# Patient Record
Sex: FEMALE | Race: WHITE | NOT HISPANIC OR LATINO | Employment: OTHER | URBAN - NONMETROPOLITAN AREA
[De-identification: names, ages, dates, MRNs, and addresses within clinical notes are randomized per-mention and may not be internally consistent; named-entity substitution may affect disease eponyms.]

---

## 2023-02-28 PROBLEM — M25.561 RIGHT KNEE PAIN: Status: ACTIVE | Noted: 2023-02-28

## 2023-02-28 PROBLEM — J32.4 CHRONIC PANSINUSITIS: Status: ACTIVE | Noted: 2023-02-28

## 2023-02-28 PROBLEM — R00.2 HEART PALPITATIONS: Status: ACTIVE | Noted: 2023-02-28

## 2023-02-28 PROBLEM — I25.10 CAD (CORONARY ATHEROSCLEROTIC DISEASE): Status: ACTIVE | Noted: 2023-02-28

## 2023-02-28 PROBLEM — L72.3 SEBACEOUS CYST: Status: ACTIVE | Noted: 2023-02-28

## 2023-02-28 PROBLEM — M17.9 OSTEOARTHRITIS OF KNEE: Status: ACTIVE | Noted: 2023-02-28

## 2023-02-28 PROBLEM — K21.9 GERD (GASTROESOPHAGEAL REFLUX DISEASE): Status: ACTIVE | Noted: 2023-02-28

## 2023-02-28 PROBLEM — I49.1 ATRIAL PREMATURE COMPLEX: Status: ACTIVE | Noted: 2023-02-28

## 2023-02-28 PROBLEM — I49.3 PREMATURE VENTRICULAR CONTRACTIONS: Status: ACTIVE | Noted: 2023-02-28

## 2023-02-28 PROBLEM — M79.7 FIBROMYALGIA: Status: ACTIVE | Noted: 2023-02-28

## 2023-02-28 PROBLEM — M79.10 MYALGIA: Status: ACTIVE | Noted: 2023-02-28

## 2023-02-28 PROBLEM — S22.000A CLOSED COMPRESSION FRACTURE OF THORACIC VERTEBRA (MULTI): Status: ACTIVE | Noted: 2023-02-28

## 2023-02-28 PROBLEM — F41.1 GENERALIZED ANXIETY DISORDER: Status: ACTIVE | Noted: 2023-02-28

## 2023-02-28 PROBLEM — R05.3 CHRONIC COUGH: Status: ACTIVE | Noted: 2023-02-28

## 2023-02-28 PROBLEM — R19.7 DIARRHEA: Status: ACTIVE | Noted: 2023-02-28

## 2023-02-28 PROBLEM — E78.5 HYPERLIPIDEMIA: Status: ACTIVE | Noted: 2023-02-28

## 2023-02-28 PROBLEM — G43.009 COMMON MIGRAINE WITHOUT AURA: Status: ACTIVE | Noted: 2023-02-28

## 2023-02-28 PROBLEM — M54.50 LUMBAGO: Status: ACTIVE | Noted: 2023-02-28

## 2023-02-28 PROBLEM — H61.23 BILATERAL IMPACTED CERUMEN: Status: ACTIVE | Noted: 2023-02-28

## 2023-02-28 PROBLEM — J30.9 ALLERGIC RHINITIS: Status: ACTIVE | Noted: 2023-02-28

## 2023-02-28 PROBLEM — J31.0 CHRONIC RHINITIS: Status: ACTIVE | Noted: 2023-02-28

## 2023-02-28 PROBLEM — M81.0 OSTEOPOROSIS: Status: ACTIVE | Noted: 2023-02-28

## 2023-02-28 PROBLEM — I73.9 PERIPHERAL VASCULAR DISEASE (CMS-HCC): Status: ACTIVE | Noted: 2023-02-28

## 2023-02-28 PROBLEM — R21 RASH: Status: ACTIVE | Noted: 2023-02-28

## 2023-02-28 PROBLEM — M54.6 THORACIC SPINE PAIN: Status: ACTIVE | Noted: 2023-02-28

## 2023-02-28 PROBLEM — F33.1 MDD (MAJOR DEPRESSIVE DISORDER), RECURRENT EPISODE, MODERATE (MULTI): Status: ACTIVE | Noted: 2023-02-28

## 2023-02-28 PROBLEM — J30.0 NONALLERGIC VASOMOTOR RHINITIS: Status: ACTIVE | Noted: 2023-02-28

## 2023-02-28 PROBLEM — M54.2 CERVICALGIA: Status: ACTIVE | Noted: 2023-02-28

## 2023-02-28 PROBLEM — M19.90 OSTEOARTHRITIS: Status: ACTIVE | Noted: 2023-02-28

## 2023-02-28 PROBLEM — N39.41 URGE INCONTINENCE OF URINE: Status: ACTIVE | Noted: 2023-02-28

## 2023-02-28 PROBLEM — K58.9 IRRITABLE BOWEL SYNDROME: Status: ACTIVE | Noted: 2023-02-28

## 2023-02-28 PROBLEM — J01.80 OTHER ACUTE SINUSITIS: Status: ACTIVE | Noted: 2023-02-28

## 2023-02-28 PROBLEM — I65.29 CAROTID ARTERY STENOSIS: Status: ACTIVE | Noted: 2023-02-28

## 2023-02-28 PROBLEM — I10 BENIGN ESSENTIAL HYPERTENSION: Status: ACTIVE | Noted: 2023-02-28

## 2023-02-28 PROBLEM — J34.2 DEVIATED SEPTUM: Status: ACTIVE | Noted: 2023-02-28

## 2023-02-28 RX ORDER — ERGOCALCIFEROL 1.25 MG/1
CAPSULE ORAL
COMMUNITY
Start: 2012-12-22 | End: 2023-09-07 | Stop reason: ALTCHOICE

## 2023-02-28 RX ORDER — MULTIVIT-MIN/IRON/FOLIC ACID/K 18-600-40
CAPSULE ORAL
COMMUNITY
End: 2023-04-28 | Stop reason: SDUPTHER

## 2023-02-28 RX ORDER — IPRATROPIUM BROMIDE 21 UG/1
SPRAY, METERED NASAL
COMMUNITY
Start: 2017-03-17 | End: 2023-04-28 | Stop reason: ALTCHOICE

## 2023-02-28 RX ORDER — EZETIMIBE 10 MG/1
1 TABLET ORAL DAILY
COMMUNITY
Start: 2020-10-20 | End: 2023-04-28 | Stop reason: ALTCHOICE

## 2023-02-28 RX ORDER — FLUTICASONE PROPIONATE 50 MCG
2 SPRAY, SUSPENSION (ML) NASAL DAILY
COMMUNITY
Start: 2022-08-16

## 2023-02-28 RX ORDER — LOPERAMIDE HYDROCHLORIDE 2 MG/1
CAPSULE ORAL
COMMUNITY
End: 2023-09-07 | Stop reason: ALTCHOICE

## 2023-02-28 RX ORDER — LORATADINE 10 MG/1
TABLET ORAL
COMMUNITY
Start: 2022-08-16 | End: 2024-01-04

## 2023-02-28 RX ORDER — TRIAMCINOLONE ACETONIDE 1 MG/G
CREAM TOPICAL AS NEEDED
COMMUNITY
Start: 2021-12-14 | End: 2024-03-15

## 2023-02-28 RX ORDER — ACETAMINOPHEN 500 MG
TABLET ORAL
COMMUNITY
End: 2023-04-28 | Stop reason: ALTCHOICE

## 2023-02-28 RX ORDER — METHYLPREDNISOLONE 4 MG/1
TABLET ORAL
COMMUNITY
Start: 2022-09-23 | End: 2023-03-06 | Stop reason: WASHOUT

## 2023-02-28 RX ORDER — OXYMETAZOLINE HCL 0.05 %
SPRAY, NON-AEROSOL (ML) NASAL
COMMUNITY
End: 2023-07-10 | Stop reason: ALTCHOICE

## 2023-02-28 NOTE — ASSESSMENT & PLAN NOTE
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

## 2023-03-06 ENCOUNTER — OFFICE VISIT (OUTPATIENT)
Dept: PRIMARY CARE | Facility: CLINIC | Age: 78
End: 2023-03-06
Payer: MEDICARE

## 2023-03-06 VITALS
DIASTOLIC BLOOD PRESSURE: 90 MMHG | SYSTOLIC BLOOD PRESSURE: 142 MMHG | OXYGEN SATURATION: 99 % | WEIGHT: 102.13 LBS | HEIGHT: 64 IN | BODY MASS INDEX: 17.44 KG/M2 | HEART RATE: 94 BPM

## 2023-03-06 DIAGNOSIS — R63.6 UNDERWEIGHT: ICD-10-CM

## 2023-03-06 DIAGNOSIS — J32.4 CHRONIC PANSINUSITIS: Primary | ICD-10-CM

## 2023-03-06 PROBLEM — M25.561 RIGHT KNEE PAIN: Status: RESOLVED | Noted: 2023-02-28 | Resolved: 2023-03-06

## 2023-03-06 PROBLEM — M54.2 CERVICALGIA: Status: RESOLVED | Noted: 2023-02-28 | Resolved: 2023-03-06

## 2023-03-06 PROBLEM — H61.23 BILATERAL IMPACTED CERUMEN: Status: RESOLVED | Noted: 2023-02-28 | Resolved: 2023-03-06

## 2023-03-06 PROBLEM — J01.80 OTHER ACUTE SINUSITIS: Status: RESOLVED | Noted: 2023-02-28 | Resolved: 2023-03-06

## 2023-03-06 PROBLEM — M19.90 OSTEOARTHRITIS: Status: RESOLVED | Noted: 2023-02-28 | Resolved: 2023-03-06

## 2023-03-06 PROBLEM — R21 RASH: Status: RESOLVED | Noted: 2023-02-28 | Resolved: 2023-03-06

## 2023-03-06 PROCEDURE — 1036F TOBACCO NON-USER: CPT | Performed by: FAMILY MEDICINE

## 2023-03-06 PROCEDURE — 1160F RVW MEDS BY RX/DR IN RCRD: CPT | Performed by: FAMILY MEDICINE

## 2023-03-06 PROCEDURE — 3080F DIAST BP >= 90 MM HG: CPT | Performed by: FAMILY MEDICINE

## 2023-03-06 PROCEDURE — 1159F MED LIST DOCD IN RCRD: CPT | Performed by: FAMILY MEDICINE

## 2023-03-06 PROCEDURE — 3077F SYST BP >= 140 MM HG: CPT | Performed by: FAMILY MEDICINE

## 2023-03-06 PROCEDURE — 99213 OFFICE O/P EST LOW 20 MIN: CPT | Performed by: FAMILY MEDICINE

## 2023-03-06 RX ORDER — AZITHROMYCIN 250 MG/1
TABLET, FILM COATED ORAL
Qty: 6 TABLET | Refills: 1 | Status: SHIPPED | OUTPATIENT
Start: 2023-03-06 | End: 2023-03-11

## 2023-03-06 NOTE — PATIENT INSTRUCTIONS
If you are on birth control, it is important you use a back up form of contraception for the next month to prevent pregnancy as some antibiotics reduce the effectiveness of birth control. This could result in an unplanned pregnancy.  It is important to complete the entire course of antibiotics because not completing the entire course can result possibly not completely clearing the infection being treated. It also can lead to causing increasing resistance to antibiotics by bacteria. Please finish the entire course of antibiotics unless told otherwise by your provider.  Common side effects of antibiotics include yeast infections, diarrhea and nausea. Sometimes over-the-counter probiotics help prevent the diarrhea and yeast infections caused by antibiotics. If you develop persistent or bloody diarrhea after taking an antibiotic, please contact your provider about the possibility of a serious secondary infection of your colon caused by the antibiotic. Sometimes the nausea from antibiotics can be helped by taking the antibiotic with food unless otherwise specified not to by the pharmacist.   If you develop a rash while on the antibiotic, if could be from the antibiotic, from the illness itself or could be from a response by some viral infections to the antibiotic. Please discuss this with your provider before assuming that you are allergic to the medication.

## 2023-03-06 NOTE — PROGRESS NOTES
Pt. Is having sinus congestion and drainage and weight loss and letter for her to have her mail box close to house. [place]ecet

## 2023-03-06 NOTE — PROGRESS NOTES
Subjective   Patient ID: Kaitlin Robles is a 78 y.o. female who presents for Sinusitis.  HPI  Has been sick for 6 weeks  No HA, fever  Some chills  Having ear pain  + pain in teeth  Right eye tearing all the time  + runny/stuffy nose  Some ST  No n/v, diarrhea  Occasional abdominal pain  No CP  Occasional SOB  Some cough- NP  A lot of PND    Can not gain weight- under a lot of stress  Thinks eats a lot but can not gain weight  No diarrhea    Need a letter to get mail box by her house because hard time getting in and out of the car to get mail    Current Outpatient Medications:     acetaminophen (Tylenol) 500 mg tablet, Acetaminophen 500 MG Oral Tablet  Refills: 0     Active, Disp: , Rfl:     ascorbic acid, vitamin C, 500 mg capsule, Vitamin C CAPS, Disp: , Rfl:     azithromycin (Zithromax) 250 mg tablet, Take 2 tablets (500 mg) by mouth once daily for 1 day, THEN 1 tablet (250 mg) once daily for 4 days. Take 2 tabs (500 mg) by mouth today, than 1 daily for 4 days.., Disp: 6 tablet, Rfl: 1    diphenhydramine HCl (BENADRYL ORAL), Benadryl TABS  Refills: 0     Active, Disp: , Rfl:     ergocalciferol (Vitamin D-2) 1.25 MG (08608 UT) capsule, Vitamin D (Ergocalciferol) 1.25 MG (58477 UT) Oral Capsule, Disp: , Rfl:     ezetimibe (Zetia) 10 mg tablet, Take 1 tablet (10 mg) by mouth once daily., Disp: , Rfl:     fluticasone (Flonase) 50 mcg/actuation nasal spray, Administer 2 sprays into each nostril once daily., Disp: , Rfl:     ipratropium (Atrovent) 21 mcg (0.03 %) nasal spray, Ipratropium Bromide 0.03 % Nasal Solution  Quantity: 30  Refills: 0      Start : 17-Mar-2017  Active, Disp: , Rfl:     loperamide (Imodium A-D) 2 mg capsule, Imodium 2 MG CAPS  Refills: 0     Active, Disp: , Rfl:     loratadine (CLARITIN ORAL), Claritin TABS  Refills: 0     Active, Disp: , Rfl:     loratadine (Claritin) 10 mg tablet, TAKE 1 TABLET EVERY MORNING AS NEEDED., Disp: , Rfl:     oxymetazoline 0.05 % nasal spray, [facility] [place] 0.05 %  Nasal Solution  Refills: 0     Active, Disp: , Rfl:     triamcinolone (Kenalog) 0.1 % cream, APPLY SPARINGLY AND MASSAGE IN TWICE DAILY., Disp: , Rfl:    Past Surgical History:   Procedure Laterality Date    BREAST LUMPECTOMY  09/25/2013    Breast Surgery Lumpectomy    COLONOSCOPY  03/26/2015    Complete Colonoscopy    ESOPHAGOGASTRODUODENOSCOPY  03/16/2016    Diagnostic Esophagogastroduodenoscopy    KNEE ARTHROSCOPY W/ DEBRIDEMENT  09/25/2013    Knee Arthroscopy (Therapeutic)    OTHER SURGICAL HISTORY  09/25/2013    Breast Surgery Enlargement Procedure Bilateral    OTHER SURGICAL HISTORY  09/25/2013    Breast Surgery Removal Of Mammary Implant Bilateral    OTHER SURGICAL HISTORY  04/18/2022    Cataract surgery    OTHER SURGICAL HISTORY  04/18/2022    Turbinate reduction    OTHER SURGICAL HISTORY  04/18/2022    [place] tooth extraction    OTHER SURGICAL HISTORY  11/20/2019    Cyst excision    TOTAL ABDOMINAL HYSTERECTOMY  09/25/2013    Total Abdominal Hysterectomy      Past Medical History:   Diagnosis Date    Acute bronchitis due to other specified organisms 01/15/2020    Acute bronchitis due to other specified organisms    Acute maxillary sinusitis, unspecified 10/10/2017    Acute non-recurrent maxillary sinusitis    Bitten or stung by nonvenomous insect and other nonvenomous arthropods, initial encounter 07/12/2016    Bedbug bite, initial encounter    Cervicalgia 02/28/2023    Encounter for screening for cardiovascular disorders 04/11/2016    Encounter for screening for cardiovascular disorders    Encounter for screening for diabetes mellitus 04/11/2016    Encounter for screening for diabetes mellitus    Encounter for screening for lipoid disorders 04/11/2016    Encounter for screening for lipoid disorders    Encounter for screening for osteoporosis 03/30/2021    Osteoporosis screening    Enteroviral vesicular pharyngitis 08/19/2015    Herpangina    Impacted cerumen, left ear 06/19/2018    Impacted cerumen of left  ear    Impacted cerumen, right ear 08/29/2018    Impacted cerumen of right ear    Localized enlarged lymph nodes 10/30/2019    Cervical lymphadenopathy    Localized enlarged lymph nodes 11/30/2016    Lymphadenopathy, inguinal    Localized swelling, mass and lump, head 09/23/2019    Lump of scalp    Osteoarthritis 02/28/2023    Otalgia, right ear 01/11/2016    Right ear pain    Other acute sinusitis 01/15/2020    Other acute sinusitis, recurrence not specified    Other fecal abnormalities 11/15/2018    Positive FIT (fecal immunochemical test)    Other forms of stomatitis 01/25/2017    Oral ulcer    Other malaise 11/20/2017    Malaise and fatigue    Other specified disorders of eustachian tube, bilateral 02/23/2018    Dysfunction of both eustachian tubes    Other specified disorders of eustachian tube, right ear 01/11/2016    Dysfunction of right eustachian tube    Personal history of diseases of the skin and subcutaneous tissue 01/06/2017    History of impetigo    Personal history of other diseases of the circulatory system     History of hypertension    Personal history of other diseases of the digestive system 11/18/2015    History of acute gastritis    Personal history of other diseases of the respiratory system 08/25/2014    Personal history of acute sinusitis    Personal history of other diseases of the respiratory system 02/23/2018    History of acute bronchitis    Personal history of other diseases of the respiratory system 10/31/2016    History of acute sinusitis    Personal history of other diseases of the respiratory system 12/16/2013    History of acute bronchitis    Personal history of other infectious and parasitic diseases 01/25/2017    History of candidiasis of mouth    Personal history of other specified conditions 06/19/2018    History of dizziness    Personal history of other specified conditions 12/28/2015    History of fatigue    Personal history of other specified conditions 11/12/2021    History  "of weight loss    Personal history of other specified conditions 01/02/2019    History of urinary frequency    Personal history of other specified conditions 10/12/2022    History of abnormal weight loss    Personal history of other specified conditions     History of painful urination    Personal history of other specified conditions 03/26/2015    History of shortness of breath    Radiculopathy, cervical region 10/10/2017    Cervical radiculopathy    Segmental and somatic dysfunction of cervical region 05/03/2017    Cervical somatic dysfunction    Spondylosis without myelopathy or radiculopathy, cervical region 09/19/2017    Cervical spondylosis    Sprain of ligaments of lumbar spine, initial encounter 02/23/2018    Lumbar sprain, initial encounter    Unspecified hearing loss, right ear 08/29/2018    Hearing loss, right    Urinary tract infection, site not specified 09/13/2021    Acute lower UTI     Social History     Tobacco Use    Smoking status: Never    Smokeless tobacco: Never      Family History   Problem Relation Name Age of Onset    Allergies Mother      Heart failure Mother      Mental illness Mother        Review of Systems    Objective   /90 Comment: 132 90  Pulse 94   Ht 1.626 m (5' 4\")   Wt 46.3 kg (102 lb 2 oz)   SpO2 99%   BMI 17.53 kg/m²    Physical Exam  Constitutional:       Appearance: Normal appearance.   HENT:      Head:      Comments: TTP over B/L Maxillary sinuses     Right Ear: Tympanic membrane normal.      Left Ear: Tympanic membrane normal.      Nose: Congestion present.      Mouth/Throat:      Mouth: Mucous membranes are moist.      Pharynx: Oropharynx is clear.   Cardiovascular:      Rate and Rhythm: Normal rate and regular rhythm.   Pulmonary:      Effort: Pulmonary effort is normal.      Breath sounds: Normal breath sounds.   Musculoskeletal:      Cervical back: Normal range of motion and neck supple.   Lymphadenopathy:      Cervical: Cervical adenopathy present. "   Neurological:      Mental Status: She is alert.   Psychiatric:         Mood and Affect: Mood is anxious.         Assessment/Plan   Problem List Items Addressed This Visit          Endocrine/Metabolic    Underweight     Suspect related to anxiety- refused meds for this            Infectious/Inflammatory    Chronic pansinusitis - Primary    Relevant Medications    azithromycin (Zithromax) 250 mg tablet       Patient understands and agrees with treatment plan    Tenzin Waldrop, DO

## 2023-03-08 NOTE — ASSESSMENT & PLAN NOTE
>>ASSESSMENT AND PLAN FOR UNDERWEIGHT WRITTEN ON 3/7/2023  9:27 PM BY HALEY DAVILA, DO    Suspect related to anxiety- refused meds for this

## 2023-04-10 ENCOUNTER — HOSPITAL ENCOUNTER (OUTPATIENT)
Dept: DATA CONVERSION | Facility: HOSPITAL | Age: 78
End: 2023-04-10
Attending: ORTHOPAEDIC SURGERY

## 2023-04-10 DIAGNOSIS — M17.11 UNILATERAL PRIMARY OSTEOARTHRITIS, RIGHT KNEE: ICD-10-CM

## 2023-04-28 ENCOUNTER — OFFICE VISIT (OUTPATIENT)
Dept: PRIMARY CARE | Facility: CLINIC | Age: 78
End: 2023-04-28
Payer: MEDICARE

## 2023-04-28 VITALS
DIASTOLIC BLOOD PRESSURE: 66 MMHG | HEIGHT: 64 IN | SYSTOLIC BLOOD PRESSURE: 102 MMHG | HEART RATE: 110 BPM | BODY MASS INDEX: 17.93 KG/M2 | OXYGEN SATURATION: 98 % | WEIGHT: 105 LBS

## 2023-04-28 DIAGNOSIS — R30.0 DYSURIA: ICD-10-CM

## 2023-04-28 DIAGNOSIS — I73.9 PERIPHERAL VASCULAR DISEASE (CMS-HCC): ICD-10-CM

## 2023-04-28 DIAGNOSIS — F33.1 MDD (MAJOR DEPRESSIVE DISORDER), RECURRENT EPISODE, MODERATE (MULTI): ICD-10-CM

## 2023-04-28 DIAGNOSIS — H66.001 NON-RECURRENT ACUTE SUPPURATIVE OTITIS MEDIA OF RIGHT EAR WITHOUT SPONTANEOUS RUPTURE OF TYMPANIC MEMBRANE: Primary | ICD-10-CM

## 2023-04-28 DIAGNOSIS — E46 PROTEIN-CALORIE MALNUTRITION, UNSPECIFIED SEVERITY (MULTI): ICD-10-CM

## 2023-04-28 DIAGNOSIS — S22.000D CLOSED COMPRESSION FRACTURE OF THORACIC VERTEBRA WITH ROUTINE HEALING, SUBSEQUENT ENCOUNTER: ICD-10-CM

## 2023-04-28 LAB
POC APPEARANCE, URINE: CLEAR
POC BILIRUBIN, URINE: NEGATIVE
POC BLOOD, URINE: NEGATIVE
POC COLOR, URINE: YELLOW
POC GLUCOSE, URINE: NEGATIVE MG/DL
POC KETONES, URINE: NEGATIVE MG/DL
POC LEUKOCYTES, URINE: ABNORMAL
POC NITRITE,URINE: NEGATIVE
POC PH, URINE: 5 PH
POC PROTEIN, URINE: NEGATIVE MG/DL
POC SPECIFIC GRAVITY, URINE: 1.02
POC UROBILINOGEN, URINE: 0.2 EU/DL

## 2023-04-28 PROCEDURE — 1036F TOBACCO NON-USER: CPT | Performed by: FAMILY MEDICINE

## 2023-04-28 PROCEDURE — 1160F RVW MEDS BY RX/DR IN RCRD: CPT | Performed by: FAMILY MEDICINE

## 2023-04-28 PROCEDURE — 99214 OFFICE O/P EST MOD 30 MIN: CPT | Performed by: FAMILY MEDICINE

## 2023-04-28 PROCEDURE — 1159F MED LIST DOCD IN RCRD: CPT | Performed by: FAMILY MEDICINE

## 2023-04-28 PROCEDURE — 3078F DIAST BP <80 MM HG: CPT | Performed by: FAMILY MEDICINE

## 2023-04-28 PROCEDURE — 81003 URINALYSIS AUTO W/O SCOPE: CPT | Performed by: FAMILY MEDICINE

## 2023-04-28 PROCEDURE — 3074F SYST BP LT 130 MM HG: CPT | Performed by: FAMILY MEDICINE

## 2023-04-28 RX ORDER — DOXYCYCLINE 100 MG/1
100 CAPSULE ORAL 2 TIMES DAILY
Qty: 20 CAPSULE | Refills: 0 | Status: SHIPPED | OUTPATIENT
Start: 2023-04-28 | End: 2023-05-08

## 2023-04-28 RX ORDER — CHLORHEXIDINE GLUCONATE ORAL RINSE 1.2 MG/ML
SOLUTION DENTAL
COMMUNITY
Start: 2023-03-23 | End: 2023-07-10 | Stop reason: ALTCHOICE

## 2023-04-28 RX ORDER — ASCORBIC ACID 500 MG
500 TABLET ORAL DAILY
COMMUNITY
Start: 2005-09-19 | End: 2023-09-07 | Stop reason: ALTCHOICE

## 2023-04-28 ASSESSMENT — ENCOUNTER SYMPTOMS
OCCASIONAL FEELINGS OF UNSTEADINESS: 1
DEPRESSION: 0
LOSS OF SENSATION IN FEET: 0

## 2023-04-28 NOTE — PROGRESS NOTES
Subjective   Patient ID: Kaitlin Robles is a 78 y.o. female who presents for UTI (Right ear bothering her ).  HPI  Having dysuria for 2-3 weeks  + urinary frequency, urgency  No hesitancy  Some urge incontinence  No hematuria, abdominal pain, n/v  No new back pain- saw Dr. Bliss for it    Started with vertigo 1 week ago  Right ear hurts  Gets lightheaded and dizzy when moves head  Some buzzing sound in the right ear  No discharge from ear  Usual runny/stuffy nose  Hearing is fair in the right ear    Mood is okay  No SI/HI  Does not want meds    Will be having knee replacement soon    Trying to eat more      Current Outpatient Medications:     ascorbic acid (Vitamin C) 500 mg tablet, Take 1 tablet (500 mg) by mouth once daily., Disp: , Rfl:     chlorhexidine (Peridex) 0.12 % solution, PLEASE SEE ATTACHED FOR DETAILED DIRECTIONS, Disp: , Rfl:     ascorbic acid, vitamin C, 500 mg capsule, Vitamin C CAPS, Disp: , Rfl:     diphenhydramine HCl (BENADRYL ORAL), Benadryl TABS  Refills: 0     Active, Disp: , Rfl:     doxycycline (Vibramycin) 100 mg capsule, Take 1 capsule (100 mg) by mouth 2 times a day for 10 days., Disp: 20 capsule, Rfl: 0    ergocalciferol (Vitamin D-2) 1.25 MG (19411 UT) capsule, Vitamin D (Ergocalciferol) 1.25 MG (60730 UT) Oral Capsule, Disp: , Rfl:     fluticasone (Flonase) 50 mcg/actuation nasal spray, Administer 2 sprays into each nostril once daily., Disp: , Rfl:     loperamide (Imodium A-D) 2 mg capsule, Imodium 2 MG CAPS  Refills: 0     Active, Disp: , Rfl:     loratadine (Claritin) 10 mg tablet, TAKE 1 TABLET EVERY MORNING AS NEEDED., Disp: , Rfl:     oxymetazoline 0.05 % nasal spray, [facility] [place] 0.05 % Nasal Solution  Refills: 0     Active, Disp: , Rfl:     triamcinolone (Kenalog) 0.1 % cream, APPLY SPARINGLY AND MASSAGE IN TWICE DAILY., Disp: , Rfl:    Past Surgical History:   Procedure Laterality Date    BREAST LUMPECTOMY  09/25/2013    Breast Surgery Lumpectomy    COLONOSCOPY   03/26/2015    Complete Colonoscopy    ESOPHAGOGASTRODUODENOSCOPY  03/16/2016    Diagnostic Esophagogastroduodenoscopy    KNEE ARTHROSCOPY W/ DEBRIDEMENT  09/25/2013    Knee Arthroscopy (Therapeutic)    OTHER SURGICAL HISTORY  09/25/2013    Breast Surgery Enlargement Procedure Bilateral    OTHER SURGICAL HISTORY  09/25/2013    Breast Surgery Removal Of Mammary Implant Bilateral    OTHER SURGICAL HISTORY  04/18/2022    Cataract surgery    OTHER SURGICAL HISTORY  04/18/2022    Turbinate reduction    OTHER SURGICAL HISTORY  04/18/2022    [place] tooth extraction    OTHER SURGICAL HISTORY  11/20/2019    Cyst excision    TOTAL ABDOMINAL HYSTERECTOMY  09/25/2013    Total Abdominal Hysterectomy      Past Medical History:   Diagnosis Date    Acute bronchitis due to other specified organisms 01/15/2020    Acute bronchitis due to other specified organisms    Acute maxillary sinusitis, unspecified 10/10/2017    Acute non-recurrent maxillary sinusitis    Bitten or stung by nonvenomous insect and other nonvenomous arthropods, initial encounter 07/12/2016    Bedbug bite, initial encounter    Cervicalgia 02/28/2023    Encounter for screening for cardiovascular disorders 04/11/2016    Encounter for screening for cardiovascular disorders    Encounter for screening for diabetes mellitus 04/11/2016    Encounter for screening for diabetes mellitus    Encounter for screening for lipoid disorders 04/11/2016    Encounter for screening for lipoid disorders    Encounter for screening for osteoporosis 03/30/2021    Osteoporosis screening    Enteroviral vesicular pharyngitis 08/19/2015    Herpangina    Impacted cerumen, left ear 06/19/2018    Impacted cerumen of left ear    Impacted cerumen, right ear 08/29/2018    Impacted cerumen of right ear    Localized enlarged lymph nodes 10/30/2019    Cervical lymphadenopathy    Localized enlarged lymph nodes 11/30/2016    Lymphadenopathy, inguinal    Localized swelling, mass and lump, head 09/23/2019     Lump of scalp    Osteoarthritis 02/28/2023    Otalgia, right ear 01/11/2016    Right ear pain    Other acute sinusitis 01/15/2020    Other acute sinusitis, recurrence not specified    Other fecal abnormalities 11/15/2018    Positive FIT (fecal immunochemical test)    Other forms of stomatitis 01/25/2017    Oral ulcer    Other malaise 11/20/2017    Malaise and fatigue    Other specified disorders of eustachian tube, bilateral 02/23/2018    Dysfunction of both eustachian tubes    Other specified disorders of eustachian tube, right ear 01/11/2016    Dysfunction of right eustachian tube    Personal history of diseases of the skin and subcutaneous tissue 01/06/2017    History of impetigo    Personal history of other diseases of the circulatory system     History of hypertension    Personal history of other diseases of the digestive system 11/18/2015    History of acute gastritis    Personal history of other diseases of the respiratory system 08/25/2014    Personal history of acute sinusitis    Personal history of other diseases of the respiratory system 02/23/2018    History of acute bronchitis    Personal history of other diseases of the respiratory system 10/31/2016    History of acute sinusitis    Personal history of other diseases of the respiratory system 12/16/2013    History of acute bronchitis    Personal history of other infectious and parasitic diseases 01/25/2017    History of candidiasis of mouth    Personal history of other specified conditions 06/19/2018    History of dizziness    Personal history of other specified conditions 12/28/2015    History of fatigue    Personal history of other specified conditions 11/12/2021    History of weight loss    Personal history of other specified conditions 01/02/2019    History of urinary frequency    Personal history of other specified conditions 10/12/2022    History of abnormal weight loss    Personal history of other specified conditions     History of painful  "urination    Personal history of other specified conditions 03/26/2015    History of shortness of breath    Radiculopathy, cervical region 10/10/2017    Cervical radiculopathy    Segmental and somatic dysfunction of cervical region 05/03/2017    Cervical somatic dysfunction    Spondylosis without myelopathy or radiculopathy, cervical region 09/19/2017    Cervical spondylosis    Sprain of ligaments of lumbar spine, initial encounter 02/23/2018    Lumbar sprain, initial encounter    Unspecified hearing loss, right ear 08/29/2018    Hearing loss, right    Urinary tract infection, site not specified 09/13/2021    Acute lower UTI     Social History     Tobacco Use    Smoking status: Never    Smokeless tobacco: Never   Substance Use Topics    Alcohol use: Never    Drug use: Never      Family History   Problem Relation Name Age of Onset    Allergies Mother      Heart failure Mother      Mental illness Mother        Review of Systems    Objective   /66   Pulse 110   Ht 1.626 m (5' 4\")   Wt 47.6 kg (105 lb)   SpO2 98%   BMI 18.02 kg/m²    Physical Exam  Vitals and nursing note reviewed.   Constitutional:       Appearance: Normal appearance.   HENT:      Head: Normocephalic and atraumatic.      Right Ear: Decreased hearing noted. A middle ear effusion is present. Tympanic membrane is erythematous and bulging.      Left Ear: Tympanic membrane, ear canal and external ear normal. Decreased hearing noted.      Ears:      Comments: Purulent effusion     Nose: Congestion present.      Right Turbinates: Pale.      Left Turbinates: Pale.      Mouth/Throat:      Mouth: Mucous membranes are moist.      Pharynx: Oropharynx is clear.   Eyes:      Extraocular Movements: Extraocular movements intact.      Conjunctiva/sclera: Conjunctivae normal.      Pupils: Pupils are equal, round, and reactive to light.   Cardiovascular:      Rate and Rhythm: Normal rate and regular rhythm.      Pulses: Normal pulses.      Heart sounds: Normal " heart sounds.   Pulmonary:      Effort: Pulmonary effort is normal.      Breath sounds: Normal breath sounds.   Abdominal:      General: Abdomen is flat. Bowel sounds are normal.      Palpations: Abdomen is soft.      Tenderness: There is no abdominal tenderness. There is no right CVA tenderness or left CVA tenderness.   Musculoskeletal:      Cervical back: Normal range of motion and neck supple.   Lymphadenopathy:      Cervical: No cervical adenopathy.   Skin:     Capillary Refill: Capillary refill takes less than 2 seconds.   Neurological:      General: No focal deficit present.      Mental Status: She is alert and oriented to person, place, and time.   Psychiatric:         Mood and Affect: Mood normal.         Behavior: Behavior normal.         Assessment/Plan   Problem List Items Addressed This Visit       MDD (major depressive disorder), recurrent episode, moderate (CMS/HCC)    Peripheral vascular disease (CMS/HCC)    Closed compression fracture of thoracic vertebra (CMS/HCC)    Protein-calorie malnutrition, unspecified severity (CMS/HCC)     Other Visit Diagnoses       Non-recurrent acute suppurative otitis media of right ear without spontaneous rupture of tympanic membrane    -  Primary    Relevant Medications    doxycycline (Vibramycin) 100 mg capsule    Dysuria        Relevant Medications    doxycycline (Vibramycin) 100 mg capsule    Other Relevant Orders    POCT UA Automated manually resulted (Completed)        OM- doxycycline. Heat    Dysuria- fluids, regular urination    Malnutrition- improving- increase food intake    MDD- refuses meds    PVD- low fat diet, refuses statin    Compression fracture- F/U with ortho      Patient understands and agrees with treatment plan    Tenzin Waldrop DO   Patient was identified as a fall risk. Risk prevention instructions provided.

## 2023-04-28 NOTE — PATIENT INSTRUCTIONS

## 2023-05-22 ENCOUNTER — HOSPITAL ENCOUNTER (OUTPATIENT)
Dept: DATA CONVERSION | Facility: HOSPITAL | Age: 78
End: 2023-05-23
Attending: ORTHOPAEDIC SURGERY | Admitting: ORTHOPAEDIC SURGERY
Payer: MEDICARE

## 2023-05-22 DIAGNOSIS — I25.10 ATHEROSCLEROTIC HEART DISEASE OF NATIVE CORONARY ARTERY WITHOUT ANGINA PECTORIS: ICD-10-CM

## 2023-05-22 DIAGNOSIS — M17.11 UNILATERAL PRIMARY OSTEOARTHRITIS, RIGHT KNEE: ICD-10-CM

## 2023-05-22 DIAGNOSIS — E78.5 HYPERLIPIDEMIA, UNSPECIFIED: ICD-10-CM

## 2023-05-22 DIAGNOSIS — I73.9 PERIPHERAL VASCULAR DISEASE, UNSPECIFIED (CMS-HCC): ICD-10-CM

## 2023-05-22 DIAGNOSIS — Z88.2 ALLERGY STATUS TO SULFONAMIDES: ICD-10-CM

## 2023-05-22 DIAGNOSIS — Z88.1 ALLERGY STATUS TO OTHER ANTIBIOTIC AGENTS: ICD-10-CM

## 2023-05-22 DIAGNOSIS — I10 ESSENTIAL (PRIMARY) HYPERTENSION: ICD-10-CM

## 2023-05-22 DIAGNOSIS — Z88.5 ALLERGY STATUS TO NARCOTIC AGENT: ICD-10-CM

## 2023-05-22 DIAGNOSIS — K58.9 IRRITABLE BOWEL SYNDROME WITHOUT DIARRHEA: ICD-10-CM

## 2023-05-22 DIAGNOSIS — K21.9 GASTRO-ESOPHAGEAL REFLUX DISEASE WITHOUT ESOPHAGITIS: ICD-10-CM

## 2023-05-22 DIAGNOSIS — Z88.0 ALLERGY STATUS TO PENICILLIN: ICD-10-CM

## 2023-05-23 LAB
ANION GAP IN SER/PLAS: 12 MMOL/L (ref 10–20)
CALCIUM (MG/DL) IN SER/PLAS: 9.2 MG/DL (ref 8.6–10.3)
CARBON DIOXIDE, TOTAL (MMOL/L) IN SER/PLAS: 26 MMOL/L (ref 21–32)
CHLORIDE (MMOL/L) IN SER/PLAS: 101 MMOL/L (ref 98–107)
CREATININE (MG/DL) IN SER/PLAS: 0.78 MG/DL (ref 0.5–1.05)
ERYTHROCYTE DISTRIBUTION WIDTH (RATIO) BY AUTOMATED COUNT: 12.9 % (ref 11.5–14.5)
ERYTHROCYTE MEAN CORPUSCULAR HEMOGLOBIN CONCENTRATION (G/DL) BY AUTOMATED: 32.2 G/DL (ref 32–36)
ERYTHROCYTE MEAN CORPUSCULAR VOLUME (FL) BY AUTOMATED COUNT: 95 FL (ref 80–100)
ERYTHROCYTES (10*6/UL) IN BLOOD BY AUTOMATED COUNT: 3.77 X10E12/L (ref 4–5.2)
GFR FEMALE: 79 ML/MIN/1.73M2
GLUCOSE (MG/DL) IN SER/PLAS: 114 MG/DL (ref 74–99)
HEMATOCRIT (%) IN BLOOD BY AUTOMATED COUNT: 36 % (ref 36–46)
HEMOGLOBIN (G/DL) IN BLOOD: 11.6 G/DL (ref 12–16)
LEUKOCYTES (10*3/UL) IN BLOOD BY AUTOMATED COUNT: 12.5 X10E9/L (ref 4.4–11.3)
PLATELETS (10*3/UL) IN BLOOD AUTOMATED COUNT: 231 X10E9/L (ref 150–450)
POTASSIUM (MMOL/L) IN SER/PLAS: 4.2 MMOL/L (ref 3.5–5.3)
SODIUM (MMOL/L) IN SER/PLAS: 135 MMOL/L (ref 136–145)
UREA NITROGEN (MG/DL) IN SER/PLAS: 12 MG/DL (ref 6–23)

## 2023-05-24 ENCOUNTER — NURSING HOME VISIT (OUTPATIENT)
Dept: POST ACUTE CARE | Facility: EXTERNAL LOCATION | Age: 78
End: 2023-05-24
Payer: MEDICARE

## 2023-05-24 DIAGNOSIS — K59.00 CONSTIPATION, UNSPECIFIED CONSTIPATION TYPE: ICD-10-CM

## 2023-05-24 DIAGNOSIS — Z96.651 S/P TOTAL KNEE ARTHROPLASTY, RIGHT: ICD-10-CM

## 2023-05-24 DIAGNOSIS — I10 BENIGN ESSENTIAL HYPERTENSION: ICD-10-CM

## 2023-05-24 DIAGNOSIS — L30.9 DERMATITIS: ICD-10-CM

## 2023-05-24 DIAGNOSIS — I25.10 ATHEROSCLEROSIS OF CORONARY ARTERY, UNSPECIFIED VESSEL OR LESION TYPE, UNSPECIFIED WHETHER ANGINA PRESENT, UNSPECIFIED WHETHER NATIVE OR TRANSPLANTED HEART: ICD-10-CM

## 2023-05-24 DIAGNOSIS — J31.0 CHRONIC RHINITIS: ICD-10-CM

## 2023-05-24 DIAGNOSIS — M16.11 PRIMARY OSTEOARTHRITIS OF RIGHT HIP: Primary | ICD-10-CM

## 2023-05-24 LAB
ALANINE AMINOTRANSFERASE (SGPT) (U/L) IN SER/PLAS: 23 U/L (ref 7–45)
ALBUMIN (G/DL) IN SER/PLAS: 3.7 G/DL (ref 3.4–5)
ALKALINE PHOSPHATASE (U/L) IN SER/PLAS: 55 U/L (ref 33–136)
ANION GAP IN SER/PLAS: 12 MMOL/L (ref 10–20)
ASPARTATE AMINOTRANSFERASE (SGOT) (U/L) IN SER/PLAS: 24 U/L (ref 9–39)
BILIRUBIN TOTAL (MG/DL) IN SER/PLAS: 0.6 MG/DL (ref 0–1.2)
CALCIUM (MG/DL) IN SER/PLAS: 9 MG/DL (ref 8.6–10.3)
CARBON DIOXIDE, TOTAL (MMOL/L) IN SER/PLAS: 26 MMOL/L (ref 21–32)
CHLORIDE (MMOL/L) IN SER/PLAS: 99 MMOL/L (ref 98–107)
CREATININE (MG/DL) IN SER/PLAS: 1.01 MG/DL (ref 0.5–1.05)
ERYTHROCYTE DISTRIBUTION WIDTH (RATIO) BY AUTOMATED COUNT: 13.1 % (ref 11.5–14.5)
ERYTHROCYTE MEAN CORPUSCULAR HEMOGLOBIN CONCENTRATION (G/DL) BY AUTOMATED: 32.4 G/DL (ref 32–36)
ERYTHROCYTE MEAN CORPUSCULAR VOLUME (FL) BY AUTOMATED COUNT: 96 FL (ref 80–100)
ERYTHROCYTES (10*6/UL) IN BLOOD BY AUTOMATED COUNT: 3.71 X10E12/L (ref 4–5.2)
GFR FEMALE: 58 ML/MIN/1.73M2
GLUCOSE (MG/DL) IN SER/PLAS: 102 MG/DL (ref 74–99)
HEMATOCRIT (%) IN BLOOD BY AUTOMATED COUNT: 35.8 % (ref 36–46)
HEMOGLOBIN (G/DL) IN BLOOD: 11.6 G/DL (ref 12–16)
LEUKOCYTES (10*3/UL) IN BLOOD BY AUTOMATED COUNT: 11 X10E9/L (ref 4.4–11.3)
PLATELETS (10*3/UL) IN BLOOD AUTOMATED COUNT: 227 X10E9/L (ref 150–450)
POTASSIUM (MMOL/L) IN SER/PLAS: 4.1 MMOL/L (ref 3.5–5.3)
PROTEIN TOTAL: 6 G/DL (ref 6.4–8.2)
SODIUM (MMOL/L) IN SER/PLAS: 133 MMOL/L (ref 136–145)
UREA NITROGEN (MG/DL) IN SER/PLAS: 17 MG/DL (ref 6–23)

## 2023-05-24 PROCEDURE — 99310 SBSQ NF CARE HIGH MDM 45: CPT | Performed by: NURSE PRACTITIONER

## 2023-05-25 ENCOUNTER — NURSING HOME VISIT (OUTPATIENT)
Dept: POST ACUTE CARE | Facility: EXTERNAL LOCATION | Age: 78
End: 2023-05-25
Payer: MEDICARE

## 2023-05-25 DIAGNOSIS — M17.11 PRIMARY OSTEOARTHRITIS OF RIGHT KNEE: ICD-10-CM

## 2023-05-25 DIAGNOSIS — I10 BENIGN ESSENTIAL HYPERTENSION: ICD-10-CM

## 2023-05-25 DIAGNOSIS — I25.10 ATHEROSCLEROSIS OF CORONARY ARTERY, UNSPECIFIED VESSEL OR LESION TYPE, UNSPECIFIED WHETHER ANGINA PRESENT, UNSPECIFIED WHETHER NATIVE OR TRANSPLANTED HEART: ICD-10-CM

## 2023-05-25 PROCEDURE — 99305 1ST NF CARE MODERATE MDM 35: CPT | Performed by: FAMILY MEDICINE

## 2023-05-25 NOTE — LETTER
Patient: Kaitlin Robles  : 1945    Encounter Date: 2023    Kaitlin Robles is a 78 y.o. female with Chief Complaint of SNF ([facility] ) H&P    Resident seen 2023 -- MP    CC: SNF ([facility]) H&P    : 1945  SNF H&P done 23 (EPIC)  Allergy: Ampicillin, ASA< Cefdinir, Gabapentin, I131, Losartan, Meloxicam, MSO4, Celexa, Demerol, Levaquin, MOtrin, PCN, Statin, Sulfa, Nuts, Shell fish, Baclofen, Viibryd  DNR-CC    S: 77 yo woman with severe right knee OA, MDD, CAD, HTN and GERD admitted for SNF rehab after hospitalized for elective Right TKA. No cp/sob. Med list & Problem list reviewed.    NON SMOKER  No Fam Hx CAD/DM    O: VSS AFEB 112# Awake, alert, NAD. Chest cta. Heart regular rhythm with occasional skip beat. Ext right knee dressing c/d/i. No c/c/e,.    A/P:  # Weakness: SNF PT/OT  # Right knee OA s/p TKA: PT/OT. Pain controlled with tramadol and robaxin. Eliquis x 30 days.  # HTN: coreg  # Hx CAD: no asa or statin. DNR CC.    Past Surgical History:   Procedure Laterality Date   • BREAST LUMPECTOMY  2013    Breast Surgery Lumpectomy   • COLONOSCOPY  2015    Complete Colonoscopy   • ESOPHAGOGASTRODUODENOSCOPY  2016    Diagnostic Esophagogastroduodenoscopy   • KNEE ARTHROSCOPY W/ DEBRIDEMENT  2013    Knee Arthroscopy (Therapeutic)   • OTHER SURGICAL HISTORY  2013    Breast Surgery Enlargement Procedure Bilateral   • OTHER SURGICAL HISTORY  2013    Breast Surgery Removal Of Mammary Implant Bilateral   • OTHER SURGICAL HISTORY  2022    Cataract surgery   • OTHER SURGICAL HISTORY  2022    Turbinate reduction   • OTHER SURGICAL HISTORY  2022    [place] tooth extraction   • OTHER SURGICAL HISTORY  2019    Cyst excision   • TOTAL ABDOMINAL HYSTERECTOMY  2013    Total Abdominal Hysterectomy      Social History     Socioeconomic History   • Marital status:      Spouse name: Not on file   • Number of children: Not on file   • Years of  education: Not on file   • Highest education level: Not on file   Occupational History   • Not on file   Tobacco Use   • Smoking status: Never   • Smokeless tobacco: Never   Vaping Use   • Vaping status: Not on file   Substance and Sexual Activity   • Alcohol use: Never   • Drug use: Never   • Sexual activity: Not on file   Other Topics Concern   • Not on file   Social History Narrative   • Not on file     Social Determinants of Health     Financial Resource Strain: Not on file   Food Insecurity: Not on file   Transportation Needs: Not on file   Physical Activity: Not on file   Stress: Not on file   Social Connections: Not on file   Intimate Partner Violence: Not on file   Housing Stability: Not on file     Past Medical History:   Diagnosis Date   • Acute bronchitis due to other specified organisms 01/15/2020    Acute bronchitis due to other specified organisms   • Acute maxillary sinusitis, unspecified 10/10/2017    Acute non-recurrent maxillary sinusitis   • Bitten or stung by nonvenomous insect and other nonvenomous arthropods, initial encounter 07/12/2016    Bedbug bite, initial encounter   • Cervicalgia 02/28/2023   • Encounter for screening for cardiovascular disorders 04/11/2016    Encounter for screening for cardiovascular disorders   • Encounter for screening for diabetes mellitus 04/11/2016    Encounter for screening for diabetes mellitus   • Encounter for screening for lipoid disorders 04/11/2016    Encounter for screening for lipoid disorders   • Encounter for screening for osteoporosis 03/30/2021    Osteoporosis screening   • Enteroviral vesicular pharyngitis 08/19/2015    Herpangina   • Impacted cerumen, left ear 06/19/2018    Impacted cerumen of left ear   • Impacted cerumen, right ear 08/29/2018    Impacted cerumen of right ear   • Localized enlarged lymph nodes 10/30/2019    Cervical lymphadenopathy   • Localized enlarged lymph nodes 11/30/2016    Lymphadenopathy, inguinal   • Localized swelling,  mass and lump, head 09/23/2019    Lump of scalp   • Osteoarthritis 02/28/2023   • Otalgia, right ear 01/11/2016    Right ear pain   • Other acute sinusitis 01/15/2020    Other acute sinusitis, recurrence not specified   • Other fecal abnormalities 11/15/2018    Positive FIT (fecal immunochemical test)   • Other forms of stomatitis 01/25/2017    Oral ulcer   • Other malaise 11/20/2017    Malaise and fatigue   • Other specified disorders of eustachian tube, bilateral 02/23/2018    Dysfunction of both eustachian tubes   • Other specified disorders of eustachian tube, right ear 01/11/2016    Dysfunction of right eustachian tube   • Personal history of diseases of the skin and subcutaneous tissue 01/06/2017    History of impetigo   • Personal history of other diseases of the circulatory system     History of hypertension   • Personal history of other diseases of the digestive system 11/18/2015    History of acute gastritis   • Personal history of other diseases of the respiratory system 08/25/2014    Personal history of acute sinusitis   • Personal history of other diseases of the respiratory system 02/23/2018    History of acute bronchitis   • Personal history of other diseases of the respiratory system 10/31/2016    History of acute sinusitis   • Personal history of other diseases of the respiratory system 12/16/2013    History of acute bronchitis   • Personal history of other infectious and parasitic diseases 01/25/2017    History of candidiasis of mouth   • Personal history of other specified conditions 06/19/2018    History of dizziness   • Personal history of other specified conditions 12/28/2015    History of fatigue   • Personal history of other specified conditions 11/12/2021    History of weight loss   • Personal history of other specified conditions 01/02/2019    History of urinary frequency   • Personal history of other specified conditions 10/12/2022    History of abnormal weight loss   • Personal history of  other specified conditions     History of painful urination   • Personal history of other specified conditions 03/26/2015    History of shortness of breath   • Radiculopathy, cervical region 10/10/2017    Cervical radiculopathy   • Segmental and somatic dysfunction of cervical region 05/03/2017    Cervical somatic dysfunction   • Spondylosis without myelopathy or radiculopathy, cervical region 09/19/2017    Cervical spondylosis   • Sprain of ligaments of lumbar spine, initial encounter 02/23/2018    Lumbar sprain, initial encounter   • Unspecified hearing loss, right ear 08/29/2018    Hearing loss, right   • Urinary tract infection, site not specified 09/13/2021    Acute lower UTI      Family History   Problem Relation Name Age of Onset   • Allergies Mother     • Heart failure Mother     • Mental illness Mother          Review of Systems   Constitutional:  Negative for chills, fatigue and fever.   HENT:  Negative for rhinorrhea and sore throat.    Eyes:  Negative for pain and redness.   Respiratory:  Negative for cough and shortness of breath.    Cardiovascular:  Negative for chest pain and palpitations.   Gastrointestinal:  Negative for abdominal pain and blood in stool.   Endocrine: Negative for polydipsia and polyuria.   Genitourinary:  Negative for dysuria and hematuria.   Musculoskeletal:  Negative for back pain and neck stiffness.   Skin:  Negative for rash and wound.   Allergic/Immunologic: Negative for environmental allergies and food allergies.   Neurological:  Negative for weakness and headaches.   Hematological:  Negative for adenopathy. Does not bruise/bleed easily.   Psychiatric/Behavioral:  Negative for hallucinations and suicidal ideas.       There were no vitals taken for this visit.  Physical Exam  Vitals reviewed.   Constitutional:       General: She is not in acute distress.     Appearance: She is not ill-appearing.   HENT:      Head: Normocephalic and atraumatic.      Right Ear: Tympanic membrane  normal.      Left Ear: Tympanic membrane normal.      Nose: No congestion or rhinorrhea.      Mouth/Throat:      Pharynx: No oropharyngeal exudate or posterior oropharyngeal erythema.   Eyes:      Extraocular Movements: Extraocular movements intact.      Conjunctiva/sclera: Conjunctivae normal.      Pupils: Pupils are equal, round, and reactive to light.   Cardiovascular:      Rate and Rhythm: Normal rate and regular rhythm.      Heart sounds: No murmur heard.     No friction rub. No gallop.   Pulmonary:      Effort: Pulmonary effort is normal.      Breath sounds: Normal breath sounds. No wheezing, rhonchi or rales.   Abdominal:      General: There is no distension.      Palpations: Abdomen is soft.      Tenderness: There is no abdominal tenderness. There is no guarding or rebound.   Musculoskeletal:         General: No swelling or deformity.      Cervical back: Normal range of motion and neck supple.      Right lower leg: No edema.      Left lower leg: No edema.   Skin:     Capillary Refill: Capillary refill takes less than 2 seconds.      Coloration: Skin is not jaundiced.      Findings: No rash.      Comments: Right knee dressing c/d/I.   Neurological:      General: No focal deficit present.      Mental Status: She is alert.      Motor: No weakness.   Psychiatric:         Mood and Affect: Mood normal.         Behavior: Behavior normal.       Lab Results   Component Value Date    WBC 11.0 05/24/2023    HGB 11.6 (L) 05/24/2023    HCT 35.8 (L) 05/24/2023    MCV 96 05/24/2023     05/24/2023     Lab Results   Component Value Date    CHOL 258 (H) 02/08/2022    CHOL 250 (H) 03/30/2021    CHOL 273 (H) 10/16/2020     Lab Results   Component Value Date    HDL 96.6 02/08/2022    HDL 91.6 03/30/2021    HDL 84.4 10/16/2020     No results found for: LDLCALC  Lab Results   Component Value Date    TRIG 75 02/08/2022    TRIG 86 03/30/2021    TRIG 77 10/16/2020     No components found for: CHOLHDL  No results found for:  HGBA1C    Assessment/Plan  Problem List Items Addressed This Visit          Circulatory    Benign essential hypertension    Overview     Patient refuses lisinopril. Start amolodipine 5 mg daily patient to followup with  Dr. Waldrop and 2-3 weeks. Discussed side effects         Current Assessment & Plan     6/2/23Off amlodipine.   BP controlled on just COREG in SNF         CAD (coronary atherosclerotic disease)       Musculoskeletal    Osteoarthritis of knee    Overview     5/25/23 Right knee OA s/p TKA: SNF PT/OT. Pain controlled with tramadol and robaxin. Eliquis x 30 days.              Electronically Signed By: Jean Claude Mason MD   6/2/23 12:57 PM

## 2023-05-26 NOTE — PROGRESS NOTES
Kaitlin Robles is a 78 y.o. female with Chief Complaint of SNF ([facility] ) H&P    Resident seen 2023 -- MP    CC: SNF ([facility]) H&P    : 1945  SNF H&P done 23 (EPIC)  Allergy: Ampicillin, ASA< Cefdinir, Gabapentin, I131, Losartan, Meloxicam, MSO4, Celexa, Demerol, Levaquin, MOtrin, PCN, Statin, Sulfa, Nuts, Shell fish, Baclofen, Viibryd  DNR-CC    S: 77 yo woman with severe right knee OA, MDD, CAD, HTN and GERD admitted for SNF rehab after hospitalized for elective Right TKA. No cp/sob. Med list & Problem list reviewed.    NON SMOKER  No Fam Hx CAD/DM    O: VSS AFEB 112# Awake, alert, NAD. Chest cta. Heart regular rhythm with occasional skip beat. Ext right knee dressing c/d/i. No c/c/e,.    A/P:  # Weakness: SNF PT/OT  # Right knee OA s/p TKA: PT/OT. Pain controlled with tramadol and robaxin. Eliquis x 30 days.  # HTN: coreg  # Hx CAD: no asa or statin. DNR CC.    Past Surgical History:   Procedure Laterality Date    BREAST LUMPECTOMY  2013    Breast Surgery Lumpectomy    COLONOSCOPY  2015    Complete Colonoscopy    ESOPHAGOGASTRODUODENOSCOPY  2016    Diagnostic Esophagogastroduodenoscopy    KNEE ARTHROSCOPY W/ DEBRIDEMENT  2013    Knee Arthroscopy (Therapeutic)    OTHER SURGICAL HISTORY  2013    Breast Surgery Enlargement Procedure Bilateral    OTHER SURGICAL HISTORY  2013    Breast Surgery Removal Of Mammary Implant Bilateral    OTHER SURGICAL HISTORY  2022    Cataract surgery    OTHER SURGICAL HISTORY  2022    Turbinate reduction    OTHER SURGICAL HISTORY  2022    [place] tooth extraction    OTHER SURGICAL HISTORY  2019    Cyst excision    TOTAL ABDOMINAL HYSTERECTOMY  2013    Total Abdominal Hysterectomy      Social History     Socioeconomic History    Marital status:      Spouse name: Not on file    Number of children: Not on file    Years of education: Not on file    Highest education level: Not on file   Occupational History     Not on file   Tobacco Use    Smoking status: Never    Smokeless tobacco: Never   Vaping Use    Vaping status: Not on file   Substance and Sexual Activity    Alcohol use: Never    Drug use: Never    Sexual activity: Not on file   Other Topics Concern    Not on file   Social History Narrative    Not on file     Social Determinants of Health     Financial Resource Strain: Not on file   Food Insecurity: Not on file   Transportation Needs: Not on file   Physical Activity: Not on file   Stress: Not on file   Social Connections: Not on file   Intimate Partner Violence: Not on file   Housing Stability: Not on file     Past Medical History:   Diagnosis Date    Acute bronchitis due to other specified organisms 01/15/2020    Acute bronchitis due to other specified organisms    Acute maxillary sinusitis, unspecified 10/10/2017    Acute non-recurrent maxillary sinusitis    Bitten or stung by nonvenomous insect and other nonvenomous arthropods, initial encounter 07/12/2016    Bedbug bite, initial encounter    Cervicalgia 02/28/2023    Encounter for screening for cardiovascular disorders 04/11/2016    Encounter for screening for cardiovascular disorders    Encounter for screening for diabetes mellitus 04/11/2016    Encounter for screening for diabetes mellitus    Encounter for screening for lipoid disorders 04/11/2016    Encounter for screening for lipoid disorders    Encounter for screening for osteoporosis 03/30/2021    Osteoporosis screening    Enteroviral vesicular pharyngitis 08/19/2015    Herpangina    Impacted cerumen, left ear 06/19/2018    Impacted cerumen of left ear    Impacted cerumen, right ear 08/29/2018    Impacted cerumen of right ear    Localized enlarged lymph nodes 10/30/2019    Cervical lymphadenopathy    Localized enlarged lymph nodes 11/30/2016    Lymphadenopathy, inguinal    Localized swelling, mass and lump, head 09/23/2019    Lump of scalp    Osteoarthritis 02/28/2023    Otalgia, right ear 01/11/2016     Right ear pain    Other acute sinusitis 01/15/2020    Other acute sinusitis, recurrence not specified    Other fecal abnormalities 11/15/2018    Positive FIT (fecal immunochemical test)    Other forms of stomatitis 01/25/2017    Oral ulcer    Other malaise 11/20/2017    Malaise and fatigue    Other specified disorders of eustachian tube, bilateral 02/23/2018    Dysfunction of both eustachian tubes    Other specified disorders of eustachian tube, right ear 01/11/2016    Dysfunction of right eustachian tube    Personal history of diseases of the skin and subcutaneous tissue 01/06/2017    History of impetigo    Personal history of other diseases of the circulatory system     History of hypertension    Personal history of other diseases of the digestive system 11/18/2015    History of acute gastritis    Personal history of other diseases of the respiratory system 08/25/2014    Personal history of acute sinusitis    Personal history of other diseases of the respiratory system 02/23/2018    History of acute bronchitis    Personal history of other diseases of the respiratory system 10/31/2016    History of acute sinusitis    Personal history of other diseases of the respiratory system 12/16/2013    History of acute bronchitis    Personal history of other infectious and parasitic diseases 01/25/2017    History of candidiasis of mouth    Personal history of other specified conditions 06/19/2018    History of dizziness    Personal history of other specified conditions 12/28/2015    History of fatigue    Personal history of other specified conditions 11/12/2021    History of weight loss    Personal history of other specified conditions 01/02/2019    History of urinary frequency    Personal history of other specified conditions 10/12/2022    History of abnormal weight loss    Personal history of other specified conditions     History of painful urination    Personal history of other specified conditions 03/26/2015    History of  shortness of breath    Radiculopathy, cervical region 10/10/2017    Cervical radiculopathy    Segmental and somatic dysfunction of cervical region 05/03/2017    Cervical somatic dysfunction    Spondylosis without myelopathy or radiculopathy, cervical region 09/19/2017    Cervical spondylosis    Sprain of ligaments of lumbar spine, initial encounter 02/23/2018    Lumbar sprain, initial encounter    Unspecified hearing loss, right ear 08/29/2018    Hearing loss, right    Urinary tract infection, site not specified 09/13/2021    Acute lower UTI      Family History   Problem Relation Name Age of Onset    Allergies Mother      Heart failure Mother      Mental illness Mother          Review of Systems   Constitutional:  Negative for chills, fatigue and fever.   HENT:  Negative for rhinorrhea and sore throat.    Eyes:  Negative for pain and redness.   Respiratory:  Negative for cough and shortness of breath.    Cardiovascular:  Negative for chest pain and palpitations.   Gastrointestinal:  Negative for abdominal pain and blood in stool.   Endocrine: Negative for polydipsia and polyuria.   Genitourinary:  Negative for dysuria and hematuria.   Musculoskeletal:  Negative for back pain and neck stiffness.   Skin:  Negative for rash and wound.   Allergic/Immunologic: Negative for environmental allergies and food allergies.   Neurological:  Negative for weakness and headaches.   Hematological:  Negative for adenopathy. Does not bruise/bleed easily.   Psychiatric/Behavioral:  Negative for hallucinations and suicidal ideas.       There were no vitals taken for this visit.  Physical Exam  Vitals reviewed.   Constitutional:       General: She is not in acute distress.     Appearance: She is not ill-appearing.   HENT:      Head: Normocephalic and atraumatic.      Right Ear: Tympanic membrane normal.      Left Ear: Tympanic membrane normal.      Nose: No congestion or rhinorrhea.      Mouth/Throat:      Pharynx: No oropharyngeal  exudate or posterior oropharyngeal erythema.   Eyes:      Extraocular Movements: Extraocular movements intact.      Conjunctiva/sclera: Conjunctivae normal.      Pupils: Pupils are equal, round, and reactive to light.   Cardiovascular:      Rate and Rhythm: Normal rate and regular rhythm.      Heart sounds: No murmur heard.     No friction rub. No gallop.   Pulmonary:      Effort: Pulmonary effort is normal.      Breath sounds: Normal breath sounds. No wheezing, rhonchi or rales.   Abdominal:      General: There is no distension.      Palpations: Abdomen is soft.      Tenderness: There is no abdominal tenderness. There is no guarding or rebound.   Musculoskeletal:         General: No swelling or deformity.      Cervical back: Normal range of motion and neck supple.      Right lower leg: No edema.      Left lower leg: No edema.   Skin:     Capillary Refill: Capillary refill takes less than 2 seconds.      Coloration: Skin is not jaundiced.      Findings: No rash.      Comments: Right knee dressing c/d/I.   Neurological:      General: No focal deficit present.      Mental Status: She is alert.      Motor: No weakness.   Psychiatric:         Mood and Affect: Mood normal.         Behavior: Behavior normal.       Lab Results   Component Value Date    WBC 11.0 05/24/2023    HGB 11.6 (L) 05/24/2023    HCT 35.8 (L) 05/24/2023    MCV 96 05/24/2023     05/24/2023     Lab Results   Component Value Date    CHOL 258 (H) 02/08/2022    CHOL 250 (H) 03/30/2021    CHOL 273 (H) 10/16/2020     Lab Results   Component Value Date    HDL 96.6 02/08/2022    HDL 91.6 03/30/2021    HDL 84.4 10/16/2020     No results found for: LDLCALC  Lab Results   Component Value Date    TRIG 75 02/08/2022    TRIG 86 03/30/2021    TRIG 77 10/16/2020     No components found for: CHOLHDL  No results found for: HGBA1C    Assessment/Plan   Problem List Items Addressed This Visit          Circulatory    Benign essential hypertension    Overview      Patient refuses lisinopril. Start amolodipine 5 mg daily patient to followup with  Dr. Waldrop and 2-3 weeks. Discussed side effects         Current Assessment & Plan     6/2/23Off amlodipine.   BP controlled on just COREG in SNF         CAD (coronary atherosclerotic disease)       Musculoskeletal    Osteoarthritis of knee    Overview     5/25/23 Right knee OA s/p TKA: SNF PT/OT. Pain controlled with tramadol and robaxin. Eliquis x 30 days.

## 2023-05-29 NOTE — PROGRESS NOTES
*Provider Impression*    Patient is a 78 year old female who is seen today for management of multiple medical problems  #R knee OA - s/p R knee TKA; PT/OT, eliquis 2.5mg BID until 6/22, tramadol 50mg q6h PRN, acetaminophen 650mg q6h PRN, add methocarbamol 500mg q6h PRN  #HTN / CAD - carvedilol 3.125mg BID  #Constipation - add senna-S 8.6/50mg 2 tabs BID  #Rhinitis / Dermatitis - flonase 50mcg daily, claritin 10mg daily, triamcinolone 0.1% daily PRN  Follow up as needed      *Chief Complaint*     R TKA    *History of Present Illness*    Patient is a 79 y/o female w/ PMH as below who was admitted for elective right TKA. Post-operative course was uneventful. Pain was controlled w/ PO pain medication. She was d/c to [facility] for therapy.    Her labs appreciated as below.     She is seen sitting up in her room today and reports pain is up to 8/10, down to 5/10 w/ medicine and rest. Reports that when she got the flexeril prior to transport her pain was well controlled but she admits that she was quite leghargic c/w nursing report. No f/c, sweats, CP, SOB, cough, n/v, diarrhea, LUTS, numbness, tingling, paresthesias, some urinary incontinence chronic, and reports LBM was 3 days ago.    We discuss treatment options and she is in agreement w/ trying methocarbamol.    [place] - Ampicillin, Aspirin, Cefdinir, Gabapentin, Iodine I 131 Tositumomab, Losartan, Meloxicam, Morphine, CeleXA, Demerol, Levaquin, Motrin, Penicillins, Statins, Sulfa Antibiotics, Nuts, Shell Fish, BACLOFEN, viibryd   PMH - PAC, HTN, CAD, carotid artery stenosis, fibromyalgia, THANH, GERD, HLD, palpitations, IBS, lumbago, depresison, OA, PVD, compression fractues  PSH - breast surgery, cataract surgery, knee arthroscopy, NOMI, turbinate reduction  FH - Mother had CHF  SocHx -  former EtOH, Never smoker    *Review of Systems*  All other systems reviewed are negative except as noted in the HPI     *Vital Signs*   Date: 5/24/23  - T: 97.9  P: 100  R: 18   BP: 148/76  SpO2: 96% on RA      *Results / Data*  CBC - Date: 5/24/23  WBC: 11.0  HGB: 11.6  HCT: 35.8  PLT: 227  ;   BMP - Date: 5/24/23  Na: 133  K: 4.1  Cl: 99  CO2: 26  BUN: 17  Cr: 1.01  Glu: 102  Ca: 9.0  ;   LFT - Date: 5/24/23  AST: 24  ALT: 23  ALP: 55  TBili: 0.6  ;   Coags - Date:   INR:   PT:    *Physical Exam*  Gen: (+) NAD, (+) well-appearing  HEENT: (+) normocephalic, (+) MMM  Neck: (+) supple  Lungs: (+) CTAB, (-) wheezes, (-) rales, (-) rhonchi  Heart: (+) RRR, (+) S1 S2, (-) murmurs  Pulses: (+) palpable  Abd: (+) soft, (+) NT, (+) ND, (+) BS+  Ext: (-) edema, (-) deformity  MSK: (-) joint swelling  Skin: (+) warm, (+) dry, (-) rash  Neuro: (+) follows commands, (-) tremor, (+) alert

## 2023-06-01 ENCOUNTER — NURSING HOME VISIT (OUTPATIENT)
Dept: POST ACUTE CARE | Facility: EXTERNAL LOCATION | Age: 78
End: 2023-06-01
Payer: MEDICARE

## 2023-06-01 DIAGNOSIS — R11.0 NAUSEA: ICD-10-CM

## 2023-06-01 DIAGNOSIS — M17.10 PRIMARY OSTEOARTHRITIS OF KNEE, UNSPECIFIED LATERALITY: ICD-10-CM

## 2023-06-01 DIAGNOSIS — I10 BENIGN ESSENTIAL HYPERTENSION: ICD-10-CM

## 2023-06-01 PROCEDURE — 99309 SBSQ NF CARE MODERATE MDM 30: CPT | Performed by: FAMILY MEDICINE

## 2023-06-01 NOTE — LETTER
Patient: Kaitlin Robles  : 1945    Encounter Date: 2023    Resident seen 2023 -- MP    CC: SNF ([place]) recheck    : 1945  SNF H&P done 23 (EPIC)  Allergy: Ampicillin, ASA< Cefdinir, Gabapentin, I131, Losartan, Meloxicam, MSO4, Celexa, Demerol, Levaquin, MOtrin, PCN, Statin, Sulfa, Nuts, Shell fish, Baclofen, Viibryd  DNR-CC    S: 77 yo woman with severe right knee OA, MDD, CAD, HTN and GERD s/p elective Right TKA. No cp/sob. C/O nausea with meds. Med list & Problem list reviewed.    NON SMOKER  No Fam Hx CAD/DM    O: VSS AFEB 111# (down 1#) Awake, alert, NAD. Chest cta. Heart regular rhythm with occasional skip beat. Ext right knee dressing c/d/i. No c/c/e.    A/P:  # Weakness: SNF PT/OT  # Nausea: zofran bid routine before meds.  # Right knee OA s/p TKA: PT/OT. Pain controlled with tramadol and robaxin. Eliquis x 30 days.  # HTN: coreg  # Hx CAD: no asa or statin. DNR CC.      Electronically Signed By: Jean Claude Mason MD   23  4:07 PM

## 2023-06-02 ASSESSMENT — ENCOUNTER SYMPTOMS
DYSURIA: 0
SORE THROAT: 0
FATIGUE: 0
PALPITATIONS: 0
HEADACHES: 0
HEMATURIA: 0
CHILLS: 0
BRUISES/BLEEDS EASILY: 0
COUGH: 0
ADENOPATHY: 0
RHINORRHEA: 0
WOUND: 0
BACK PAIN: 0
NECK STIFFNESS: 0
ABDOMINAL PAIN: 0
SHORTNESS OF BREATH: 0
FEVER: 0
POLYDIPSIA: 0
EYE REDNESS: 0
EYE PAIN: 0
BLOOD IN STOOL: 0
HALLUCINATIONS: 0
WEAKNESS: 0

## 2023-06-05 ENCOUNTER — NURSING HOME VISIT (OUTPATIENT)
Dept: POST ACUTE CARE | Facility: EXTERNAL LOCATION | Age: 78
End: 2023-06-05
Payer: MEDICARE

## 2023-06-05 DIAGNOSIS — I10 ESSENTIAL HYPERTENSION, BENIGN: ICD-10-CM

## 2023-06-05 DIAGNOSIS — J32.9 SINUSITIS, UNSPECIFIED CHRONICITY, UNSPECIFIED LOCATION: ICD-10-CM

## 2023-06-05 DIAGNOSIS — M19.90 OSTEOARTHRITIS, UNSPECIFIED OSTEOARTHRITIS TYPE, UNSPECIFIED SITE: ICD-10-CM

## 2023-06-05 PROCEDURE — 99309 SBSQ NF CARE MODERATE MDM 30: CPT | Performed by: FAMILY MEDICINE

## 2023-06-05 NOTE — PROGRESS NOTES
Resident seen 2023 -- MP    CC: [facility] ([place]) recheck    : 1945  SNF H&P done 23 (EPIC)  Allergy: Ampicillin, ASA< Cefdinir, Gabapentin, I131, Losartan, Meloxicam, MSO4, Celexa, Demerol, Levaquin, MOtrin, PCN, Statin, Sulfa, Nuts, Shell fish, Baclofen, Viibryd  DNR-CC    S: 79 yo woman with severe right knee OA, MDD, CAD, HTN and GERD s/p elective Right TKA. No cp/sob. C/O nausea with meds. Med list & Problem list reviewed.    NON SMOKER  No Fam Hx CAD/DM    O: VSS AFEB 111# (down 1#) Awake, alert, NAD. Chest cta. Heart regular rhythm with occasional skip beat. Ext right knee dressing c/d/i. No c/c/e.    A/P:  # Weakness: SNF PT/OT  # Nausea: zofran bid routine before meds.  # Right knee OA s/p TKA: PT/OT. Pain controlled with tramadol and robaxin. Eliquis x 30 days.  # HTN: coreg  # Hx CAD: no asa or statin. DNR CC.

## 2023-06-05 NOTE — LETTER
Patient: Kaitlin Robles  : 1945    Encounter Date: 2023    Resident seen 2023 -- MP    CC: [facility] ([place]) recheck    : 1945  SNF H&P done 23 (EPIC)  Allergy: Ampicillin, ASA< Cefdinir, Gabapentin, I131, Losartan, Meloxicam, MSO4, Celexa, Demerol, Levaquin, MOtrin, PCN, Statin, Sulfa, Nuts, Shell fish, Baclofen, Viibryd  DNR-CC    S: 79 yo woman with severe right knee OA, MDD, CAD, HTN and GERD s/p elective Right TKA. No cp/sob. C/O nausea with meds -- refuses Beta Blocker. C/O right ear pain and sinusitis. Med list & Problem list reviewed.    NON SMOKER  No Fam Hx CAD/DM    O: VSS AFEB 112# (up 1#) Awake, alert, NAD. Chest cta. Heart irregular rhythm. Ext right knee dressing c/d/i. No c/c/e.    A/P:  # Weakness: SNF PT/OT  # Sinusitis & Ear pain: zpak and atrovent nasal spray.  # Nausea: zofran bid routine before meds.  # Right knee OA s/p TKA: PT/OT. Pain controlled with tramadol and robaxin. Eliquis x 30 days.  # HTN: DC coreg per pt request. Permissive HTN but will need to monitor for tachycardia.  # Hx CAD: no asa or statin. DNR CC.      Electronically Signed By: Jean Claude Mason MD   23  4:07 PM

## 2023-06-05 NOTE — PROGRESS NOTES
Resident seen 2023 -- MP    CC: [facility] ([place]) recheck    : 1945  SNF H&P done 23 (EPIC)  Allergy: Ampicillin, ASA< Cefdinir, Gabapentin, I131, Losartan, Meloxicam, MSO4, Celexa, Demerol, Levaquin, MOtrin, PCN, Statin, Sulfa, Nuts, Shell fish, Baclofen, Viibryd  DNR-CC    S: 77 yo woman with severe right knee OA, MDD, CAD, HTN and GERD s/p elective Right TKA. No cp/sob. C/O nausea with meds -- refuses Beta Blocker. C/O right ear pain and sinusitis. Med list & Problem list reviewed.    NON SMOKER  No Fam Hx CAD/DM    O: VSS AFEB 112# (up 1#) Awake, alert, NAD. Chest cta. Heart irregular rhythm. Ext right knee dressing c/d/i. No c/c/e.    A/P:  # Weakness: [facility] PT/OT  # Sinusitis & Ear pain: zpak and atrovent nasal spray.  # Nausea: zofran bid routine before meds.  # Right knee OA s/p TKA: PT/OT. Pain controlled with tramadol and robaxin. Eliquis x 30 days.  # HTN: DC coreg per pt request. Permissive HTN but will need to monitor for tachycardia.  # Hx CAD: no asa or statin. DNR CC.

## 2023-06-07 ENCOUNTER — NURSING HOME VISIT (OUTPATIENT)
Dept: POST ACUTE CARE | Facility: EXTERNAL LOCATION | Age: 78
End: 2023-06-07
Payer: MEDICARE

## 2023-06-07 DIAGNOSIS — I25.10 ATHEROSCLEROSIS OF CORONARY ARTERY, UNSPECIFIED VESSEL OR LESION TYPE, UNSPECIFIED WHETHER ANGINA PRESENT, UNSPECIFIED WHETHER NATIVE OR TRANSPLANTED HEART: ICD-10-CM

## 2023-06-07 DIAGNOSIS — L30.9 DERMATITIS: ICD-10-CM

## 2023-06-07 DIAGNOSIS — H66.90 OTITIS MEDIA, UNSPECIFIED LATERALITY, UNSPECIFIED OTITIS MEDIA TYPE: ICD-10-CM

## 2023-06-07 DIAGNOSIS — K59.00 CONSTIPATION, UNSPECIFIED CONSTIPATION TYPE: ICD-10-CM

## 2023-06-07 DIAGNOSIS — J32.9 SINUSITIS, UNSPECIFIED CHRONICITY, UNSPECIFIED LOCATION: ICD-10-CM

## 2023-06-07 DIAGNOSIS — M17.10 PRIMARY OSTEOARTHRITIS OF KNEE, UNSPECIFIED LATERALITY: Primary | ICD-10-CM

## 2023-06-07 DIAGNOSIS — R11.0 NAUSEA: ICD-10-CM

## 2023-06-07 DIAGNOSIS — Z96.651 S/P TOTAL KNEE ARTHROPLASTY, RIGHT: ICD-10-CM

## 2023-06-07 DIAGNOSIS — I10 ESSENTIAL HYPERTENSION, BENIGN: ICD-10-CM

## 2023-06-07 PROCEDURE — 99309 SBSQ NF CARE MODERATE MDM 30: CPT | Performed by: NURSE PRACTITIONER

## 2023-06-12 ENCOUNTER — NURSING HOME VISIT (OUTPATIENT)
Dept: POST ACUTE CARE | Facility: EXTERNAL LOCATION | Age: 78
End: 2023-06-12
Payer: MEDICARE

## 2023-06-12 DIAGNOSIS — M19.90 OSTEOARTHRITIS, UNSPECIFIED OSTEOARTHRITIS TYPE, UNSPECIFIED SITE: ICD-10-CM

## 2023-06-12 DIAGNOSIS — I10 BENIGN ESSENTIAL HYPERTENSION: ICD-10-CM

## 2023-06-12 PROCEDURE — 99309 SBSQ NF CARE MODERATE MDM 30: CPT | Performed by: FAMILY MEDICINE

## 2023-06-12 NOTE — LETTER
Patient: Kaitlin Robles  : 1945    Encounter Date: 2023    Resident seen 2023 -- MP    CC: SNF ([place]) recheck    : 1945  SNF H&P done 23 (EPIC)  Allergy: Ampicillin, ASA< Cefdinir, Gabapentin, I131, Losartan, Meloxicam, MSO4, Celexa, Demerol, Levaquin, MOtrin, PCN, Statin, Sulfa, Nuts, Shell fish, Baclofen, Viibryd  DNR-CC    S: 77 yo woman with severe right knee OA, MDD, CAD, HTN and GERD s/p elective Right TKA. No cp/sob. C/O nausea with meds -- refuses Beta Blocker and requests to stop ALL non-prn meds. Med list & Problem list reviewed.    NON SMOKER  No Fam Hx CAD/DM    O: VSS AFEB 104# (down 8#) Awake, alert, NAD. Chest cta. Heart irregular rhythm. Ext right knee dressing c/d/i with steri-strips. No c/c/e.    A/P:  # Weakness: continue SNF PT/OT  # Hx recent Sinusitis & Ear pain: completed zpak and atrovent nasal spray.  # Nausea: stop routine zofran, continue prn only.  # Right knee OA s/p TKA: PT/OT. Pain controlled with prn tramadol and prn robaxin. DC eliquis per patient request.  # HTN: permissive HTN with high normal pulse rate as patient refuses all bp meds.  # Hx CAD: no asa nor statin. DNR CC.      Electronically Signed By: Jean Claude Mason MD   6/15/23  7:32 PM

## 2023-06-14 ENCOUNTER — NURSING HOME VISIT (OUTPATIENT)
Dept: POST ACUTE CARE | Facility: EXTERNAL LOCATION | Age: 78
End: 2023-06-14
Payer: MEDICARE

## 2023-06-14 DIAGNOSIS — J32.9 SINUSITIS, UNSPECIFIED CHRONICITY, UNSPECIFIED LOCATION: ICD-10-CM

## 2023-06-14 DIAGNOSIS — I10 ESSENTIAL HYPERTENSION, BENIGN: ICD-10-CM

## 2023-06-14 DIAGNOSIS — I25.10 ATHEROSCLEROSIS OF CORONARY ARTERY, UNSPECIFIED VESSEL OR LESION TYPE, UNSPECIFIED WHETHER ANGINA PRESENT, UNSPECIFIED WHETHER NATIVE OR TRANSPLANTED HEART: ICD-10-CM

## 2023-06-14 DIAGNOSIS — M19.90 OSTEOARTHRITIS, UNSPECIFIED OSTEOARTHRITIS TYPE, UNSPECIFIED SITE: Primary | ICD-10-CM

## 2023-06-14 DIAGNOSIS — K59.00 CONSTIPATION, UNSPECIFIED CONSTIPATION TYPE: ICD-10-CM

## 2023-06-14 DIAGNOSIS — R11.0 NAUSEA: ICD-10-CM

## 2023-06-14 DIAGNOSIS — H66.90 OTITIS MEDIA, UNSPECIFIED LATERALITY, UNSPECIFIED OTITIS MEDIA TYPE: ICD-10-CM

## 2023-06-14 DIAGNOSIS — Z96.651 S/P TOTAL KNEE ARTHROPLASTY, RIGHT: ICD-10-CM

## 2023-06-14 DIAGNOSIS — M54.50 ACUTE LOW BACK PAIN WITHOUT SCIATICA, UNSPECIFIED BACK PAIN LATERALITY: ICD-10-CM

## 2023-06-14 PROCEDURE — 99309 SBSQ NF CARE MODERATE MDM 30: CPT | Performed by: NURSE PRACTITIONER

## 2023-06-14 NOTE — PROGRESS NOTES
Resident seen 2023 -- MP    CC: SNF ([place]) recheck    : 1945  SNF H&P done 23 (EPIC)  Allergy: Ampicillin, ASA< Cefdinir, Gabapentin, I131, Losartan, Meloxicam, MSO4, Celexa, Demerol, Levaquin, MOtrin, PCN, Statin, Sulfa, Nuts, Shell fish, Baclofen, Viibryd  DNR-CC    S: 79 yo woman with severe right knee OA, MDD, CAD, HTN and GERD s/p elective Right TKA. No cp/sob. C/O nausea with meds -- refuses Beta Blocker and requests to stop ALL non-prn meds. Med list & Problem list reviewed.    NON SMOKER  No Fam Hx CAD/DM    O: VSS AFEB 104# (down 8#) Awake, alert, NAD. Chest cta. Heart irregular rhythm. Ext right knee dressing c/d/i with steri-strips. No c/c/e.    A/P:  # Weakness: continue SNF PT/OT  # Hx recent Sinusitis & Ear pain: completed zpak and atrovent nasal spray.  # Nausea: stop routine zofran, continue prn only.  # Right knee OA s/p TKA: PT/OT. Pain controlled with prn tramadol and prn robaxin. DC eliquis per patient request.  # HTN: permissive HTN with high normal pulse rate as patient refuses all bp meds.  # Hx CAD: no asa nor statin. DNR CC.

## 2023-06-15 LAB — PREALBUMIN (MG/DL) IN SERUM OR PLASMA: 17.2 MG/DL (ref 18–40)

## 2023-06-19 ENCOUNTER — NURSING HOME VISIT (OUTPATIENT)
Dept: POST ACUTE CARE | Facility: EXTERNAL LOCATION | Age: 78
End: 2023-06-19
Payer: MEDICARE

## 2023-06-19 DIAGNOSIS — I10 BENIGN ESSENTIAL HYPERTENSION: ICD-10-CM

## 2023-06-19 DIAGNOSIS — M17.11 PRIMARY OSTEOARTHRITIS OF RIGHT KNEE: ICD-10-CM

## 2023-06-19 PROCEDURE — 99308 SBSQ NF CARE LOW MDM 20: CPT | Performed by: FAMILY MEDICINE

## 2023-06-19 NOTE — PROGRESS NOTES
Resident seen 2023 -- MP    CC: SNF ([place]) recheck    : 1945  SNF H&P done 23 (EPIC)  Allergy: Ampicillin, ASA< Cefdinir, Gabapentin, I131, Losartan, Meloxicam, MSO4, Celexa, Demerol, Levaquin, MOtrin, PCN, Statin, Sulfa, Nuts, Shell fish, Baclofen, Viibryd  DNR-CC    S: 77 yo woman with severe right knee OA, MDD, CAD, HTN and GERD s/p elective Right TKA. No cp/sob. Nausea resolved with improved appetite since discontinued ALL non-prn meds. Med list & Problem list reviewed.    NON SMOKER  No Fam Hx CAD/DM    O: VSS AFEB 104# (23) Awake, alert, NAD. Chest cta. Heart irregular rhythm. Ext right knee dressing c/d/i with steri-strips. No c/c/e.    A/P:  # Weakness: continue SNF PT/OT  # Nausea: OFF zofran, continue prn only.  # Right knee OA s/p TKA: PT/OT. Pain controlled with prn tramadol and prn robaxin. OFF eliquis per patient request.  # HTN: permissive HTN with high normal pulse rate as patient refuses all bp meds.  # Hx CAD: no asa nor statin. DNR CC.  # Hx recent Sinusitis & Ear pain: completed zpak and atrovent nasal spray.

## 2023-06-19 NOTE — LETTER
Patient: Kaitlin Robles  : 1945    Encounter Date: 2023    Resident seen 2023 -- MP    CC: SNF ([place]) recheck    : 1945  SNF H&P done 23 (EPIC)  Allergy: Ampicillin, ASA< Cefdinir, Gabapentin, I131, Losartan, Meloxicam, MSO4, Celexa, Demerol, Levaquin, MOtrin, PCN, Statin, Sulfa, Nuts, Shell fish, Baclofen, Viibryd  DNR-CC    S: 77 yo woman with severe right knee OA, MDD, CAD, HTN and GERD s/p elective Right TKA. No cp/sob. Nausea resolved with improved appetite since discontinued ALL non-prn meds. Med list & Problem list reviewed.    NON SMOKER  No Fam Hx CAD/DM    O: VSS AFEB 104# (23) Awake, alert, NAD. Chest cta. Heart irregular rhythm. Ext right knee dressing c/d/i with steri-strips. No c/c/e.    A/P:  # Weakness: continue SNF PT/OT  # Nausea: OFF zofran, continue prn only.  # Right knee OA s/p TKA: PT/OT. Pain controlled with prn tramadol and prn robaxin. OFF eliquis per patient request.  # HTN: permissive HTN with high normal pulse rate as patient refuses all bp meds.  # Hx CAD: no asa nor statin. DNR CC.  # Hx recent Sinusitis & Ear pain: completed zpak and atrovent nasal spray.      Electronically Signed By: Jean Claude Mason MD   23  5:37 PM

## 2023-06-19 NOTE — PROGRESS NOTES
*Provider Impression*    Patient is a 78 year old female who is seen today for management of multiple medical problems  #R knee OA / Back pain - s/p R knee TKA; PT/OT, eliquis 2.5mg BID until 6/22, tramadol 50mg q6h PRN, acetaminophen 650mg q6h PRN, methocarbamol 500mg q6h PRN, check XR L spine 2v  #Sinusitis / Otitis / Dermatitis - flonase 50mcg daily, claritin 10mg daily, triamcinolone 0.1% daily PRN, ipratropium 0.06% 2 sprays BID, zpack, debrox 5gtts BID until 6/11  #HTN / CAD - diet controlled  #Constipation / Nausea - senna-S 8.6/50mg 2 tabs BID PRN, zofran 4mg BID and q6h PRN, Tums 1000mg q4h PRN  Follow up as needed      *Chief Complaint*     R TKA / sinusitis    *History of Present Illness*    Patient is a 79 y/o female w/ PMH as below who was admitted for elective right TKA. Post-operative course was uneventful. Pain was controlled w/ PO pain medication. She was d/c to [facility] for therapy.    Her senna was made PRN.    She is seen sitting up in her room today and reports some back pain - she thinks she twisted wrong while self transferring. Denies any radiculitis, some knee pain, f/c, sweats, CP, SOB, cough, n/v, constipation, diarrhea, LUTS, new incontinence, LBM was today, and denies any other new c/o presently.     Alleriges - Ampicillin, Aspirin, Cefdinir, Gabapentin, Iodine I 131 Tositumomab, Losartan, Meloxicam, Morphine, CeleXA, Demerol, Levaquin, Motrin, Penicillins, Statins, Sulfa Antibiotics, Nuts, Shell Fish, BACLOFEN, viibryd   PMH - PAC, HTN, CAD, carotid artery stenosis, fibromyalgia, THANH, GERD, HLD, palpitations, IBS, lumbago, depresison, OA, PVD, compression fractues  PSH - breast surgery, cataract surgery, knee arthroscopy, NOMI, turbinate reduction  FH - Mother had CHF  SocHx -  former EtOH, Never smoker    *Review of Systems*  All other systems reviewed are negative except as noted in the HPI     *Vital Signs*   Date: 6/14/23  - T: 97.5  P: 97  R: 16  BP: 140/74  SpO2: 94% on RA       *Results / Data*  CBC - Date: 5/24/23  WBC: 11.0  HGB: 11.6  HCT: 35.8  PLT: 227  ;   BMP - Date: 5/24/23  Na: 133  K: 4.1  Cl: 99  CO2: 26  BUN: 17  Cr: 1.01  Glu: 102  Ca: 9.0  ;   LFT - Date: 5/24/23  AST: 24  ALT: 23  ALP: 55  TBili: 0.6  ;   Coags - Date:   INR:   PT:    *Physical Exam*  Gen: (+) NAD, (+) well-appearing  HEENT: (+) normocephalic, (+) MMM  Neck: (+) supple  Lungs: (+) CTAB, (-) wheezes, (-) rales, (-) rhonchi  Heart: (+) RRR, (+) S1 S2, (-) murmurs  Pulses: (+) palpable  Abd: (+) soft, (+) NT, (+) ND, (+) BS+  Ext: (-) edema, (-) deformity  MSK: (-) joint swelling  Skin: (+) warm, (+) dry, (-) rash  Neuro: (+) follows commands, (-) tremor, (+) alert

## 2023-06-19 NOTE — PROGRESS NOTES
*Provider Impression*    Patient is a 78 year old female who is seen today for management of multiple medical problems  #R knee OA - s/p R knee TKA; PT/OT, eliquis 2.5mg BID until 6/22, tramadol 50mg q6h PRN, acetaminophen 650mg q6h PRN, methocarbamol 500mg q6h PRN  #Sinusitis / Otitis / Dermatitis - flonase 50mcg daily, claritin 10mg daily, triamcinolone 0.1% daily PRN, ipratropium 0.06% 2 sprays BID, zpack, debrox 5gtts BID until 6/11  #HTN / CAD - carvedilol 3.125mg BID  #Constipation / Nausea - senna-S 8.6/50mg 2 tabs BID, zofran 4mg BID and q6h PRN, Tums 1000mg q4h PRN  Follow up as needed      *Chief Complaint*     R TKA / sinusitis    *History of Present Illness*    Patient is a 77 y/o female w/ PMH as below who was admitted for elective right TKA. Post-operative course was uneventful. Pain was controlled w/ PO pain medication. She was d/c to [facility] [place] for therapy.    Reportedly she had f/u w/ Dr. Potter. No notes available for review. She was started on zpack, debrox, ipratropium for sinusitis/otitis.    She is seen sitting up in her room today and denies f/c, sweats, CP, SOB, cough, just sinus infection, some nausea, no emesis, no edema, numnbess, tingling, paresthesias, no dizziness, or any other new c/o presently.     Alleriges - Ampicillin, Aspirin, Cefdinir, Gabapentin, Iodine I 131 Tositumomab, Losartan, Meloxicam, Morphine, CeleXA, Demerol, Levaquin, Motrin, Penicillins, Statins, Sulfa Antibiotics, Nuts, Shell Fish, BACLOFEN, viibryd   PMH - PAC, HTN, CAD, carotid artery stenosis, fibromyalgia, THANH, GERD, HLD, palpitations, IBS, lumbago, depresison, OA, PVD, compression fractues  PSH - breast surgery, cataract surgery, knee arthroscopy, NOMI, turbinate reduction  FH - Mother had CHF  SocHx -  former EtOH, Never smoker    *Review of Systems*  All other systems reviewed are negative except as noted in the HPI     *Vital Signs*   Date: 6/7/23  - T: 97.5  P: 97  R: 16  BP: 166/83  SpO2: 94% on RA       *Results / Data*  CBC - Date: 5/24/23  WBC: 11.0  HGB: 11.6  HCT: 35.8  PLT: 227  ;   BMP - Date: 5/24/23  Na: 133  K: 4.1  Cl: 99  CO2: 26  BUN: 17  Cr: 1.01  Glu: 102  Ca: 9.0  ;   LFT - Date: 5/24/23  AST: 24  ALT: 23  ALP: 55  TBili: 0.6  ;   Coags - Date:   INR:   PT:    *Physical Exam*  Gen: (+) NAD, (+) well-appearing  HEENT: (+) normocephalic, (+) MMM  Neck: (+) supple  Lungs: (+) CTAB, (-) wheezes, (-) rales, (-) rhonchi  Heart: (+) RRR, (+) S1 S2, (-) murmurs  Pulses: (+) palpable  Abd: (+) soft, (+) NT, (+) ND, (+) BS+  Ext: (-) edema, (-) deformity  MSK: (-) joint swelling  Skin: (+) warm, (+) dry, (-) rash  Neuro: (+) follows commands, (-) tremor, (+) alert

## 2023-06-21 ENCOUNTER — NURSING HOME VISIT (OUTPATIENT)
Dept: POST ACUTE CARE | Facility: EXTERNAL LOCATION | Age: 78
End: 2023-06-21
Payer: MEDICARE

## 2023-06-21 DIAGNOSIS — J31.0 CHRONIC RHINITIS: ICD-10-CM

## 2023-06-21 DIAGNOSIS — Z96.651 S/P TOTAL KNEE ARTHROPLASTY, RIGHT: ICD-10-CM

## 2023-06-21 DIAGNOSIS — I25.10 ATHEROSCLEROSIS OF CORONARY ARTERY, UNSPECIFIED VESSEL OR LESION TYPE, UNSPECIFIED WHETHER ANGINA PRESENT, UNSPECIFIED WHETHER NATIVE OR TRANSPLANTED HEART: ICD-10-CM

## 2023-06-21 DIAGNOSIS — M54.50 ACUTE LOW BACK PAIN WITHOUT SCIATICA, UNSPECIFIED BACK PAIN LATERALITY: ICD-10-CM

## 2023-06-21 DIAGNOSIS — K59.00 CONSTIPATION, UNSPECIFIED CONSTIPATION TYPE: ICD-10-CM

## 2023-06-21 DIAGNOSIS — I10 ESSENTIAL HYPERTENSION, BENIGN: ICD-10-CM

## 2023-06-21 DIAGNOSIS — R11.0 NAUSEA: ICD-10-CM

## 2023-06-21 DIAGNOSIS — M17.11 PRIMARY OSTEOARTHRITIS OF RIGHT KNEE: Primary | ICD-10-CM

## 2023-06-21 DIAGNOSIS — L30.9 DERMATITIS: ICD-10-CM

## 2023-06-21 PROCEDURE — 99309 SBSQ NF CARE MODERATE MDM 30: CPT | Performed by: NURSE PRACTITIONER

## 2023-06-26 NOTE — PROGRESS NOTES
*Provider Impression*    Patient is a 78 year old female who is seen today for management of multiple medical problems  #R knee OA / Back pain - s/p R knee TKA; PT/OT, eliquis 2.5mg BID until 6/22, tramadol 50mg q6h PRN, acetaminophen 650mg q6h PRN, methocarbamol 500mg q6h PRN, f/u w/ Dr. Potter 7/7, check XR R knee 2v prior to appt  #Rhinitis / Dermatitis - flonase 50mcg daily, claritin 10mg daily, triamcinolone 0.1% daily PRN, ipratropium 0.06% 2 sprays BID, add claritin 10mg daily PRN  #HTN / CAD - diet controlled  #Constipation / Nausea - senna-S 8.6/50mg 2 tabs BID PRN, zofran 4mg BID and q6h PRN, Tums 1000mg q4h PRN  Follow up as needed      *Chief Complaint*     R TKA / sinusitis    *History of Present Illness*    Patient is a 79 y/o female w/ PMH as below who was admitted for elective right TKA. Post-operative course was uneventful. Pain was controlled w/ PO pain medication. She was d/c to [facility] [place] for therapy.    Her XR was ok.     She is seen sitting up in her room and reports some issues with balance, leaning a little, has had some issues with her lumbar spine, and some challenges w/ coordination. No f/c, sweats, CP, SOB, cough, abd pain, n/v, constipation, diarrhea, just loose sometimes, edema, numbness, tingling, paresthesias, the edema is about the same, and denies any other new c/o presently.     Alleriges - Ampicillin, Aspirin, Cefdinir, Gabapentin, Iodine I 131 Tositumomab, Losartan, Meloxicam, Morphine, CeleXA, Demerol, Levaquin, Motrin, Penicillins, Statins, Sulfa Antibiotics, Nuts, Shell Fish, BACLOFEN, viibryd   PMH - PAC, HTN, CAD, carotid artery stenosis, fibromyalgia, THANH, GERD, HLD, palpitations, IBS, lumbago, depresison, OA, PVD, compression fractues  PSH - breast surgery, cataract surgery, knee arthroscopy, NOMI, turbinate reduction  FH - Mother had CHF  SocHx -  former EtOH, Never smoker    *Review of Systems*  All other systems reviewed are negative except as noted in the HPI      *Vital Signs*   Date: 6/21/23  - T: 98.0  P: 95  R: 17  BP: 152/81  SpO2: 99% on RA      *Results / Data*  CBC - Date: 5/24/23  WBC: 11.0  HGB: 11.6  HCT: 35.8  PLT: 227  ;   BMP - Date: 5/24/23  Na: 133  K: 4.1  Cl: 99  CO2: 26  BUN: 17  Cr: 1.01  Glu: 102  Ca: 9.0  ;   LFT - Date: 5/24/23  AST: 24  ALT: 23  ALP: 55  TBili: 0.6  ;   Coags - Date:   INR:   PT:    *Physical Exam*  Gen: (+) NAD, (+) well-appearing  HEENT: (+) normocephalic, (+) MMM  Neck: (+) supple  Lungs: (+) CTAB, (-) wheezes, (-) rales, (-) rhonchi  Heart: (+) RRR, (+) S1 S2, (-) murmurs  Pulses: (+) palpable  Abd: (+) soft, (+) NT, (+) ND, (+) BS+  Ext: (-) edema, (-) deformity  MSK: (-) joint swelling  Skin: (+) warm, (+) dry, (-) rash  Neuro: (+) follows commands, (-) tremor, (+) alert

## 2023-06-28 ENCOUNTER — NURSING HOME VISIT (OUTPATIENT)
Dept: POST ACUTE CARE | Facility: EXTERNAL LOCATION | Age: 78
End: 2023-06-28
Payer: MEDICARE

## 2023-06-28 DIAGNOSIS — M17.11 PRIMARY OSTEOARTHRITIS OF RIGHT KNEE: Primary | ICD-10-CM

## 2023-06-28 DIAGNOSIS — R11.0 NAUSEA: ICD-10-CM

## 2023-06-28 DIAGNOSIS — Z96.651 S/P TOTAL KNEE ARTHROPLASTY, RIGHT: ICD-10-CM

## 2023-06-28 DIAGNOSIS — I25.10 ATHEROSCLEROSIS OF CORONARY ARTERY, UNSPECIFIED VESSEL OR LESION TYPE, UNSPECIFIED WHETHER ANGINA PRESENT, UNSPECIFIED WHETHER NATIVE OR TRANSPLANTED HEART: ICD-10-CM

## 2023-06-28 DIAGNOSIS — J31.0 CHRONIC RHINITIS: ICD-10-CM

## 2023-06-28 DIAGNOSIS — I10 ESSENTIAL HYPERTENSION, BENIGN: ICD-10-CM

## 2023-06-28 DIAGNOSIS — M54.50 ACUTE LOW BACK PAIN WITHOUT SCIATICA, UNSPECIFIED BACK PAIN LATERALITY: ICD-10-CM

## 2023-06-28 DIAGNOSIS — L30.9 DERMATITIS: ICD-10-CM

## 2023-06-28 DIAGNOSIS — K59.00 CONSTIPATION, UNSPECIFIED CONSTIPATION TYPE: ICD-10-CM

## 2023-06-28 PROCEDURE — 99316 NF DSCHRG MGMT 30 MIN+: CPT | Performed by: NURSE PRACTITIONER

## 2023-07-03 NOTE — PROGRESS NOTES
*Provider Impression*    Patient is a 78 year old female who is seen today for management of multiple medical problems  #R knee OA / Back pain - s/p R knee TKA; d/c to home w/ home health PT/OT,  tramadol 50mg q6h PRN, acetaminophen 650mg q6h PRN, methocarbamol 500mg q6h PRN, f/u w/ Dr. Potter 7/7  #Rhinitis / Dermatitis - flonase 50mcg daily, claritin 10mg daily, triamcinolone 0.1% daily PRN, ipratropium 0.06% 2 sprays BID, claritin 10mg daily PRN  #HTN / CAD - diet controlled  #Constipation / Nausea - senna-S 8.6/50mg 2 tabs BID PRN, zofran 4mg BID and q6h PRN, Tums 1000mg q4h PRN  Follow up w/ PCP  Time spent: >30 minutes of which greater than 50% spent in counseling and/or coordination of care     *Chief Complaint*     R TKA / sinusitis    *History of Present Illness*    Patient is a 79 y/o female w/ PMH as below who was admitted for elective right TKA. Post-operative course was uneventful. Pain was controlled w/ PO pain medication. She was d/c to [facility] for therapy.    She is discharging to home.     She has a f/u w/ Dr. Waldrop, 7/10, and has f/u w/ Dr. Potter 7/7.    She is seen sitting up in her room today and reports that pain varies w/ worst at 8/10, usually minimal w/ pain meds. No CP, SOB, cough, n/v, constipation, diarrhea, and denies any other new c/o presently.     Alleriges - Ampicillin, Aspirin, Cefdinir, Gabapentin, Iodine I 131 Tositumomab, Losartan, Meloxicam, Morphine, CeleXA, Demerol, Levaquin, Motrin, Penicillins, Statins, Sulfa Antibiotics, Nuts, Shell Fish, BACLOFEN, viibryd   PMH - PAC, HTN, CAD, carotid artery stenosis, fibromyalgia, THANH, GERD, HLD, palpitations, IBS, lumbago, depresison, OA, PVD, compression fractues  PSH - breast surgery, cataract surgery, knee arthroscopy, NOMI, turbinate reduction  FH - Mother had CHF  SocHx -  former EtOH, Never smoker    *Review of Systems*  All other systems reviewed are negative except as noted in the HPI     *Vital Signs*   Date: 6/28/23   - T: 96.6  P: 86  R: 16  BP: 118/78  SpO2: 99% on RA      *Results / Data*  CBC - Date: 5/24/23  WBC: 11.0  HGB: 11.6  HCT: 35.8  PLT: 227  ;   BMP - Date: 5/24/23  Na: 133  K: 4.1  Cl: 99  CO2: 26  BUN: 17  Cr: 1.01  Glu: 102  Ca: 9.0  ;   LFT - Date: 5/24/23  AST: 24  ALT: 23  ALP: 55  TBili: 0.6  ;   Coags - Date:   INR:   PT:    *Physical Exam*  Gen: (+) NAD, (+) well-appearing  HEENT: (+) normocephalic, (+) MMM  Neck: (+) supple  Lungs: (+) CTAB, (-) wheezes, (-) rales, (-) rhonchi  Heart: (+) RRR, (+) S1 S2, (-) murmurs  Pulses: (+) palpable  Abd: (+) soft, (+) NT, (+) ND, (+) BS+  Ext: (-) edema, (-) deformity  MSK: (-) joint swelling  Skin: (+) warm, (+) dry, (-) rash  Neuro: (+) follows commands, (-) tremor, (+) alert

## 2023-07-10 ENCOUNTER — OFFICE VISIT (OUTPATIENT)
Dept: PRIMARY CARE | Facility: CLINIC | Age: 78
End: 2023-07-10
Payer: MEDICARE

## 2023-07-10 VITALS
HEART RATE: 65 BPM | OXYGEN SATURATION: 98 % | SYSTOLIC BLOOD PRESSURE: 136 MMHG | BODY MASS INDEX: 17.16 KG/M2 | DIASTOLIC BLOOD PRESSURE: 78 MMHG | WEIGHT: 100 LBS

## 2023-07-10 DIAGNOSIS — E46 PROTEIN-CALORIE MALNUTRITION, UNSPECIFIED SEVERITY (MULTI): ICD-10-CM

## 2023-07-10 DIAGNOSIS — I73.9 PERIPHERAL VASCULAR DISEASE (CMS-HCC): ICD-10-CM

## 2023-07-10 DIAGNOSIS — E78.2 MIXED HYPERLIPIDEMIA: ICD-10-CM

## 2023-07-10 DIAGNOSIS — F33.1 MDD (MAJOR DEPRESSIVE DISORDER), RECURRENT EPISODE, MODERATE (MULTI): ICD-10-CM

## 2023-07-10 DIAGNOSIS — I10 BENIGN ESSENTIAL HYPERTENSION: Primary | ICD-10-CM

## 2023-07-10 PROBLEM — M17.11 OSTEOARTHRITIS OF RIGHT KNEE: Status: ACTIVE | Noted: 2023-02-28

## 2023-07-10 PROBLEM — M19.90 OSTEOARTHRITIS: Status: RESOLVED | Noted: 2023-02-28 | Resolved: 2023-07-10

## 2023-07-10 LAB
ALANINE AMINOTRANSFERASE (SGPT) (U/L) IN SER/PLAS: 10 U/L (ref 7–45)
ALBUMIN (G/DL) IN SER/PLAS: 4.3 G/DL (ref 3.4–5)
ALKALINE PHOSPHATASE (U/L) IN SER/PLAS: 71 U/L (ref 33–136)
ANION GAP IN SER/PLAS: 14 MMOL/L (ref 10–20)
ASPARTATE AMINOTRANSFERASE (SGOT) (U/L) IN SER/PLAS: 12 U/L (ref 9–39)
BILIRUBIN TOTAL (MG/DL) IN SER/PLAS: 0.4 MG/DL (ref 0–1.2)
CALCIUM (MG/DL) IN SER/PLAS: 9.4 MG/DL (ref 8.6–10.3)
CARBON DIOXIDE, TOTAL (MMOL/L) IN SER/PLAS: 25 MMOL/L (ref 21–32)
CHLORIDE (MMOL/L) IN SER/PLAS: 105 MMOL/L (ref 98–107)
CREATININE (MG/DL) IN SER/PLAS: 0.78 MG/DL (ref 0.5–1.05)
ERYTHROCYTE DISTRIBUTION WIDTH (RATIO) BY AUTOMATED COUNT: 13.6 % (ref 11.5–14.5)
ERYTHROCYTE MEAN CORPUSCULAR HEMOGLOBIN CONCENTRATION (G/DL) BY AUTOMATED: 31.3 G/DL (ref 32–36)
ERYTHROCYTE MEAN CORPUSCULAR VOLUME (FL) BY AUTOMATED COUNT: 97 FL (ref 80–100)
ERYTHROCYTES (10*6/UL) IN BLOOD BY AUTOMATED COUNT: 3.65 X10E12/L (ref 4–5.2)
GFR FEMALE: 78 ML/MIN/1.73M2
GLUCOSE (MG/DL) IN SER/PLAS: 93 MG/DL (ref 74–99)
HEMATOCRIT (%) IN BLOOD BY AUTOMATED COUNT: 35.5 % (ref 36–46)
HEMOGLOBIN (G/DL) IN BLOOD: 11.1 G/DL (ref 12–16)
LEUKOCYTES (10*3/UL) IN BLOOD BY AUTOMATED COUNT: 6.1 X10E9/L (ref 4.4–11.3)
PLATELETS (10*3/UL) IN BLOOD AUTOMATED COUNT: 320 X10E9/L (ref 150–450)
POTASSIUM (MMOL/L) IN SER/PLAS: 4 MMOL/L (ref 3.5–5.3)
PROTEIN TOTAL: 6.8 G/DL (ref 6.4–8.2)
SODIUM (MMOL/L) IN SER/PLAS: 140 MMOL/L (ref 136–145)
UREA NITROGEN (MG/DL) IN SER/PLAS: 18 MG/DL (ref 6–23)

## 2023-07-10 PROCEDURE — 3075F SYST BP GE 130 - 139MM HG: CPT | Performed by: FAMILY MEDICINE

## 2023-07-10 PROCEDURE — 85027 COMPLETE CBC AUTOMATED: CPT

## 2023-07-10 PROCEDURE — 3078F DIAST BP <80 MM HG: CPT | Performed by: FAMILY MEDICINE

## 2023-07-10 PROCEDURE — 1159F MED LIST DOCD IN RCRD: CPT | Performed by: FAMILY MEDICINE

## 2023-07-10 PROCEDURE — 80053 COMPREHEN METABOLIC PANEL: CPT

## 2023-07-10 PROCEDURE — 99214 OFFICE O/P EST MOD 30 MIN: CPT | Performed by: FAMILY MEDICINE

## 2023-07-10 PROCEDURE — 1160F RVW MEDS BY RX/DR IN RCRD: CPT | Performed by: FAMILY MEDICINE

## 2023-07-10 PROCEDURE — 1036F TOBACCO NON-USER: CPT | Performed by: FAMILY MEDICINE

## 2023-07-10 RX ORDER — VITAMIN E MIXED 400 UNIT
CAPSULE ORAL DAILY
COMMUNITY
End: 2023-09-07 | Stop reason: ALTCHOICE

## 2023-07-10 NOTE — PROGRESS NOTES
Subjective   Patient ID: Kaitlin Robles is a 78 y.o. female who presents for post snf (Discharged from [facility] 6/29).  HPI  Had TKR on 5/22/23 by Dr. Potter  At [facility] until 6/29/23  Having good days and bad days  Knee pain 5/10 from surgery  Foot pain 6/10 due to swelling  Taking Tylenol for pain  Icing the knee  No numbness, weakness in leg  Using rollator to ambulate  Getting home PT, SNF, OT  Has fallen once- foot would not move and ended up falling  No bruising  No fever, chills  No CP, SOB, palpitations  No dizziness, HA, vision changes    Mood is down a little because brother in hospice  Memory- is okay  Appetite is great    Current Outpatient Medications:     ascorbic acid (Vitamin C) 500 mg tablet, Take 1 tablet (500 mg) by mouth once daily., Disp: , Rfl:     diphenhydramine HCl (BENADRYL ORAL), Benadryl TABS  Refills: 0     Active, Disp: , Rfl:     ergocalciferol (Vitamin D-2) 1.25 MG (71743 UT) capsule, Vitamin D (Ergocalciferol) 1.25 MG (29788 UT) Oral Capsule, Disp: , Rfl:     fluticasone (Flonase) 50 mcg/actuation nasal spray, Administer 2 sprays into each nostril once daily., Disp: , Rfl:     loperamide (Imodium A-D) 2 mg capsule, Imodium 2 MG CAPS  Refills: 0     Active, Disp: , Rfl:     loratadine (Claritin) 10 mg tablet, TAKE 1 TABLET EVERY MORNING AS NEEDED., Disp: , Rfl:     triamcinolone (Kenalog) 0.1 % cream, APPLY SPARINGLY AND MASSAGE IN TWICE DAILY., Disp: , Rfl:     vitamin E 450 mg (1000 unit) capsule, Take by mouth once daily., Disp: , Rfl:    Past Surgical History:   Procedure Laterality Date    BREAST LUMPECTOMY  09/25/2013    Breast Surgery Lumpectomy    COLONOSCOPY  03/26/2015    Complete Colonoscopy    ESOPHAGOGASTRODUODENOSCOPY  03/16/2016    Diagnostic Esophagogastroduodenoscopy    KNEE ARTHROSCOPY W/ DEBRIDEMENT  09/25/2013    Knee Arthroscopy (Therapeutic)    OTHER SURGICAL HISTORY  09/25/2013    Breast Surgery Enlargement Procedure Bilateral    OTHER SURGICAL HISTORY   09/25/2013    Breast Surgery Removal Of Mammary Implant Bilateral    OTHER SURGICAL HISTORY  04/18/2022    Cataract surgery    OTHER SURGICAL HISTORY  04/18/2022    Turbinate reduction    OTHER SURGICAL HISTORY  04/18/2022    [place] tooth extraction    OTHER SURGICAL HISTORY  11/20/2019    Cyst excision    TOTAL ABDOMINAL HYSTERECTOMY  09/25/2013    Total Abdominal Hysterectomy    TOTAL KNEE ARTHROPLASTY Right 05/22/2023    Dr. Potter      Past Medical History:   Diagnosis Date    Acute bronchitis due to other specified organisms 01/15/2020    Acute bronchitis due to other specified organisms    Acute maxillary sinusitis, unspecified 10/10/2017    Acute non-recurrent maxillary sinusitis    Bitten or stung by nonvenomous insect and other nonvenomous arthropods, initial encounter 07/12/2016    Bedbug bite, initial encounter    Cervicalgia 02/28/2023    Encounter for screening for cardiovascular disorders 04/11/2016    Encounter for screening for cardiovascular disorders    Encounter for screening for diabetes mellitus 04/11/2016    Encounter for screening for diabetes mellitus    Encounter for screening for lipoid disorders 04/11/2016    Encounter for screening for lipoid disorders    Encounter for screening for osteoporosis 03/30/2021    Osteoporosis screening    Enteroviral vesicular pharyngitis 08/19/2015    Herpangina    Impacted cerumen, left ear 06/19/2018    Impacted cerumen of left ear    Impacted cerumen, right ear 08/29/2018    Impacted cerumen of right ear    Localized enlarged lymph nodes 10/30/2019    Cervical lymphadenopathy    Localized enlarged lymph nodes 11/30/2016    Lymphadenopathy, inguinal    Localized swelling, mass and lump, head 09/23/2019    Lump of scalp    Osteoarthritis 02/28/2023    Otalgia, right ear 01/11/2016    Right ear pain    Other acute sinusitis 01/15/2020    Other acute sinusitis, recurrence not specified    Other fecal abnormalities 11/15/2018    Positive FIT (fecal  immunochemical test)    Other forms of stomatitis 01/25/2017    Oral ulcer    Other malaise 11/20/2017    Malaise and fatigue    Other specified disorders of eustachian tube, bilateral 02/23/2018    Dysfunction of both eustachian tubes    Other specified disorders of eustachian tube, right ear 01/11/2016    Dysfunction of right eustachian tube    Personal history of diseases of the skin and subcutaneous tissue 01/06/2017    History of impetigo    Personal history of other diseases of the circulatory system     History of hypertension    Personal history of other diseases of the digestive system 11/18/2015    History of acute gastritis    Personal history of other diseases of the respiratory system 08/25/2014    Personal history of acute sinusitis    Personal history of other diseases of the respiratory system 02/23/2018    History of acute bronchitis    Personal history of other diseases of the respiratory system 10/31/2016    History of acute sinusitis    Personal history of other diseases of the respiratory system 12/16/2013    History of acute bronchitis    Personal history of other infectious and parasitic diseases 01/25/2017    History of candidiasis of mouth    Personal history of other specified conditions 06/19/2018    History of dizziness    Personal history of other specified conditions 12/28/2015    History of fatigue    Personal history of other specified conditions 11/12/2021    History of weight loss    Personal history of other specified conditions 01/02/2019    History of urinary frequency    Personal history of other specified conditions 10/12/2022    History of abnormal weight loss    Personal history of other specified conditions     History of painful urination    Personal history of other specified conditions 03/26/2015    History of shortness of breath    Radiculopathy, cervical region 10/10/2017    Cervical radiculopathy    Segmental and somatic dysfunction of cervical region 05/03/2017     Cervical somatic dysfunction    Spondylosis without myelopathy or radiculopathy, cervical region 09/19/2017    Cervical spondylosis    Sprain of ligaments of lumbar spine, initial encounter 02/23/2018    Lumbar sprain, initial encounter    Unspecified hearing loss, right ear 08/29/2018    Hearing loss, right    Urinary tract infection, site not specified 09/13/2021    Acute lower UTI     Social History     Tobacco Use    Smoking status: Never    Smokeless tobacco: Never   Substance Use Topics    Alcohol use: Never    Drug use: Never      Family History   Problem Relation Name Age of Onset    Allergies Mother      Heart failure Mother      Mental illness Mother        Review of Systems    Objective   /78   Pulse 65   Wt 45.4 kg (100 lb)   SpO2 98%   BMI 17.16 kg/m²    Physical Exam  Vitals and nursing note reviewed.   Constitutional:       Appearance: Normal appearance.   HENT:      Head: Normocephalic and atraumatic.      Right Ear: Tympanic membrane, ear canal and external ear normal. Decreased hearing noted.      Left Ear: Tympanic membrane, ear canal and external ear normal. Decreased hearing noted.      Ears:      Comments: Purulent effusion     Nose:      Right Turbinates: Pale.      Left Turbinates: Pale.      Mouth/Throat:      Mouth: Mucous membranes are moist.      Pharynx: Oropharynx is clear.   Eyes:      Extraocular Movements: Extraocular movements intact.      Conjunctiva/sclera: Conjunctivae normal.      Pupils: Pupils are equal, round, and reactive to light.   Cardiovascular:      Rate and Rhythm: Normal rate and regular rhythm.      Pulses: Normal pulses.      Heart sounds: Normal heart sounds.   Pulmonary:      Effort: Pulmonary effort is normal.      Breath sounds: Normal breath sounds.   Abdominal:      General: Abdomen is flat. Bowel sounds are normal.      Palpations: Abdomen is soft.      Tenderness: There is no abdominal tenderness. There is no right CVA tenderness or left CVA  tenderness.   Musculoskeletal:      Cervical back: Normal range of motion and neck supple.   Lymphadenopathy:      Cervical: No cervical adenopathy.   Skin:     Capillary Refill: Capillary refill takes less than 2 seconds.   Neurological:      General: No focal deficit present.      Mental Status: She is alert and oriented to person, place, and time.   Psychiatric:         Mood and Affect: Mood normal.         Behavior: Behavior normal.         Assessment/Plan   Problem List Items Addressed This Visit       Benign essential hypertension - Primary    Relevant Orders    CBC (Completed)    Comprehensive Metabolic Panel (Completed)    Hyperlipidemia    MDD (major depressive disorder), recurrent episode, moderate (CMS/HCC)    Peripheral vascular disease (CMS/HCC)    Protein-calorie malnutrition, unspecified severity (CMS/HCC)    Relevant Orders    CBC (Completed)    Comprehensive Metabolic Panel (Completed)     HTN- DASH diet, monitor BP at home    Hyperlipidemia- refuses statin, TLC     Malnutrition- improving- increase food intake     MDD- refuses meds     PVD- low fat diet, refuses statin    Patient understands and agrees with treatment plan    Tenzin Waldrop DO   Patient was identified as a fall risk. Risk prevention instructions provided.

## 2023-07-10 NOTE — PATIENT INSTRUCTIONS

## 2023-08-18 ENCOUNTER — NURSING HOME VISIT (OUTPATIENT)
Dept: POST ACUTE CARE | Facility: EXTERNAL LOCATION | Age: 78
End: 2023-08-18
Payer: MEDICARE

## 2023-08-18 DIAGNOSIS — I10 ESSENTIAL HYPERTENSION, BENIGN: ICD-10-CM

## 2023-08-18 DIAGNOSIS — G45.9 TIA (TRANSIENT ISCHEMIC ATTACK): ICD-10-CM

## 2023-08-18 DIAGNOSIS — G20.A1 PARKINSON DISEASE (MULTI): Primary | ICD-10-CM

## 2023-08-18 DIAGNOSIS — E56.9 VITAMIN DEFICIENCY: ICD-10-CM

## 2023-08-18 DIAGNOSIS — G89.29 CHRONIC LOW BACK PAIN, UNSPECIFIED BACK PAIN LATERALITY, UNSPECIFIED WHETHER SCIATICA PRESENT: ICD-10-CM

## 2023-08-18 DIAGNOSIS — J31.0 CHRONIC RHINITIS: ICD-10-CM

## 2023-08-18 DIAGNOSIS — I25.10 ATHEROSCLEROSIS OF CORONARY ARTERY, UNSPECIFIED VESSEL OR LESION TYPE, UNSPECIFIED WHETHER ANGINA PRESENT, UNSPECIFIED WHETHER NATIVE OR TRANSPLANTED HEART: ICD-10-CM

## 2023-08-18 DIAGNOSIS — M54.50 CHRONIC LOW BACK PAIN, UNSPECIFIED BACK PAIN LATERALITY, UNSPECIFIED WHETHER SCIATICA PRESENT: ICD-10-CM

## 2023-08-18 DIAGNOSIS — L30.9 DERMATITIS: ICD-10-CM

## 2023-08-18 PROCEDURE — 99310 SBSQ NF CARE HIGH MDM 45: CPT | Performed by: NURSE PRACTITIONER

## 2023-08-21 ENCOUNTER — NURSING HOME VISIT (OUTPATIENT)
Dept: POST ACUTE CARE | Facility: EXTERNAL LOCATION | Age: 78
End: 2023-08-21
Payer: MEDICARE

## 2023-08-21 DIAGNOSIS — M19.90 ARTHRITIS: ICD-10-CM

## 2023-08-21 DIAGNOSIS — G20.A1 PARKINSON'S DISEASE (MULTI): ICD-10-CM

## 2023-08-21 DIAGNOSIS — I10 BENIGN ESSENTIAL HYPERTENSION: ICD-10-CM

## 2023-08-21 LAB
ANION GAP IN SER/PLAS: 11 MMOL/L (ref 10–20)
BASOPHILS (10*3/UL) IN BLOOD BY AUTOMATED COUNT: 0.02 X10E9/L (ref 0–0.1)
BASOPHILS/100 LEUKOCYTES IN BLOOD BY AUTOMATED COUNT: 0.4 % (ref 0–2)
CALCIUM (MG/DL) IN SER/PLAS: 9.4 MG/DL (ref 8.6–10.3)
CARBON DIOXIDE, TOTAL (MMOL/L) IN SER/PLAS: 30 MMOL/L (ref 21–32)
CHLORIDE (MMOL/L) IN SER/PLAS: 102 MMOL/L (ref 98–107)
CREATININE (MG/DL) IN SER/PLAS: 0.81 MG/DL (ref 0.5–1.05)
EOSINOPHILS (10*3/UL) IN BLOOD BY AUTOMATED COUNT: 0.26 X10E9/L (ref 0–0.4)
EOSINOPHILS/100 LEUKOCYTES IN BLOOD BY AUTOMATED COUNT: 4.8 % (ref 0–6)
ERYTHROCYTE DISTRIBUTION WIDTH (RATIO) BY AUTOMATED COUNT: 13.2 % (ref 11.5–14.5)
ERYTHROCYTE MEAN CORPUSCULAR HEMOGLOBIN CONCENTRATION (G/DL) BY AUTOMATED: 31.1 G/DL (ref 32–36)
ERYTHROCYTE MEAN CORPUSCULAR VOLUME (FL) BY AUTOMATED COUNT: 96 FL (ref 80–100)
ERYTHROCYTES (10*6/UL) IN BLOOD BY AUTOMATED COUNT: 3.98 X10E12/L (ref 4–5.2)
GFR FEMALE: 75 ML/MIN/1.73M2
GLUCOSE (MG/DL) IN SER/PLAS: 88 MG/DL (ref 74–99)
HEMATOCRIT (%) IN BLOOD BY AUTOMATED COUNT: 38.3 % (ref 36–46)
HEMOGLOBIN (G/DL) IN BLOOD: 11.9 G/DL (ref 12–16)
IMMATURE GRANULOCYTES/100 LEUKOCYTES IN BLOOD BY AUTOMATED COUNT: 0.2 % (ref 0–0.9)
LEUKOCYTES (10*3/UL) IN BLOOD BY AUTOMATED COUNT: 5.4 X10E9/L (ref 4.4–11.3)
LYMPHOCYTES (10*3/UL) IN BLOOD BY AUTOMATED COUNT: 1.03 X10E9/L (ref 0.8–3)
LYMPHOCYTES/100 LEUKOCYTES IN BLOOD BY AUTOMATED COUNT: 19 % (ref 13–44)
MONOCYTES (10*3/UL) IN BLOOD BY AUTOMATED COUNT: 0.53 X10E9/L (ref 0.05–0.8)
MONOCYTES/100 LEUKOCYTES IN BLOOD BY AUTOMATED COUNT: 9.8 % (ref 2–10)
NEUTROPHILS (10*3/UL) IN BLOOD BY AUTOMATED COUNT: 3.58 X10E9/L (ref 1.6–5.5)
NEUTROPHILS/100 LEUKOCYTES IN BLOOD BY AUTOMATED COUNT: 65.8 % (ref 40–80)
PLATELETS (10*3/UL) IN BLOOD AUTOMATED COUNT: 319 X10E9/L (ref 150–450)
POTASSIUM (MMOL/L) IN SER/PLAS: 3.9 MMOL/L (ref 3.5–5.3)
SODIUM (MMOL/L) IN SER/PLAS: 139 MMOL/L (ref 136–145)
UREA NITROGEN (MG/DL) IN SER/PLAS: 15 MG/DL (ref 6–23)

## 2023-08-21 PROCEDURE — 99305 1ST NF CARE MODERATE MDM 35: CPT | Performed by: FAMILY MEDICINE

## 2023-08-21 NOTE — PROGRESS NOTES
*Provider Impression*    Patient is a 78 year old female who is seen today for management of multiple medical problems  #Parkinson's / Back pain - PT/OT,  acetaminophen 650mg q6h PRN, sinemet 25/100mg TID, f/u w/ neurology  #HTN / CAD / TIA - Plavix 75mg daily  #Rhinitis / Dermatitis - flonase 50mcg daily, claritin 10mg daily, triamcinolone 0.1% daily PRN, benadryl 25mg q6h PRN  #Vitamin deficiency - vitamin D 2000units daily, vitamin C 1000mg daily, vitamin B complex daily  Follow up as needed     *Chief Complaint*     fall, parkinson's    *History of Present Illness*    Patient is a 77 y/o female w/ PMH as below who who presented to [facility] by EMS s/p mechanical fall.  Admitted for TIA/CVA rule out s/p fall.  Neuro consulted.  CT brain, MRI/MRA head/neck all imaging were negative for ischemic changes.  Patient  was started on sinemet for new dx of parkinson's. She was then discharged to [facility] where she underwent rehab until she transferred to [facility] once a bed opened.       She is seen sitting up in her room today and reports therapy went ok, back pain is 5-6/10, resting it is down to 0/10, cant't sit or stand too long, no radiation, no HA, dizziness, n/v, constipation, diarrhea, LUTS, no f/c, sweats, denies any other new c/o presently.      She is asking for an XR L spine, but one was done during the last admission in June and was reflective of chronic degenerative changes c/w her known history.     [place]es - Ampicillin, Aspirin, Cefdinir, Gabapentin, Iodine I 131 Tositumomab, Losartan, Meloxicam, Morphine, CeleXA, Demerol, Levaquin, Motrin, Penicillins, Statins, Sulfa Antibiotics, Nuts, Shell Fish, BACLOFEN, viibryd   PMH - PAC, HTN, CAD, carotid artery stenosis, fibromyalgia, THANH, GERD, HLD, palpitations, IBS, lumbago, depresison, OA, PVD, compression fractues  PSH - breast surgery, cataract surgery, knee arthroscopy, NOMI, turbinate reduction  FH - Mother had CHF  SocHx -  former EtOH, Never  smoker    *Review of Systems*  All other systems reviewed are negative except as noted in the HPI     *Vital Signs*   Date: 8/18/23  - T: 97.1  P: 103  R: 18  BP: 131/70  SpO2: % on RA      *Results / Data*  CBC - Date: 8/12/23  WBC: 4.5  HGB: 11.5  HCT: 37.1  PLT: 230  ;   BMP - Date: 8/12/23  Na: 139  K: 4.2  Cl: 104  CO2: 28  BUN: 18  Cr: 0.82  Glu: 88  Ca: 9.1  ;   LFT - Date: 8/12/23  AST: 11  ALT: <3  ALP: 53  TBili: 0.4  ;   Coags - Date:   INR:   PT:    *Physical Exam*  Gen: (+) NAD, (+) well-appearing  HEENT: (+) normocephalic, (+) MMM  Neck: (+) supple  Lungs: (+) CTAB, (-) wheezes, (-) rales, (-) rhonchi  Heart: (+) RRR, (+) S1 S2, (+) 2/6 JUAN  Pulses: (+) palpable  Abd: (+) soft, (+) NT, (+) ND, (+) BS+  Ext: (-) edema, (-) deformity  MSK: (-) joint swelling  Skin: (+) warm, (+) dry, (-) rash  Neuro: (+) follows commands, (-) tremor, (+) alert

## 2023-08-21 NOTE — LETTER
Patient: Kaitlin Robles  : 1945    Encounter Date: 2023    Kaitlin Robles is a 78 y.o. female with Chief Complaint of SNF ([place]) H&P    Resident seen 2023 -- MP    CC: [facility] ([place]) recheck    : 1945  SNF H&P done 23, 23 (EPIC)  Allergy: Ampicillin, ASA< Cefdinir, Gabapentin, I131, Losartan, Meloxicam, MSO4, Celexa, Demerol, Levaquin, MOtrin, PCN, Statin, Sulfa, Nuts, Shell fish, Baclofen, Viibryd  DNR-CC    S: 79 yo woman with OA s/p Rt TKA 2023, MDD, CAD, HTN and GERD admitted to SNF rehab after hospitalization for weaknes and new dx of PD -- tolerating sinemet. No cp/sob. Med list & Problem list reviewed.    NON SMOKER  No Fam Hx CAD/DM    O: VSS AFEB 107# (up 3# from 2023) Awake, alert, NAD. Chest cta. Heart irregular rhythm. Ext mild cogwheeling. No c/c/e.    LAB (23) Na 139, Cr 0.81, Hgb 11.9    A/P:  # Weakness/PD: Sinemet. F/U Neuro Dr Ro. SNF PT/OT prior to returning home with home PT/OT ordered for help with transition to home with homebound status.  # Right knee OA s/p TKA: 2023.  # HTN: permissive HTN with high normal pulse rate as patient refuses all bp meds.  # CAD: PLAVIX. no asa nor statin. DNR CC.    Past Surgical History:   Procedure Laterality Date   • BREAST LUMPECTOMY  2013    Breast Surgery Lumpectomy   • COLONOSCOPY  2015    Complete Colonoscopy   • ESOPHAGOGASTRODUODENOSCOPY  2016    Diagnostic Esophagogastroduodenoscopy   • KNEE ARTHROSCOPY W/ DEBRIDEMENT  2013    Knee Arthroscopy (Therapeutic)   • MR HEAD ANGIO WO IV CONTRAST  2023    MR HEAD ANGIO WO IV CONTRAST 2023 [facility] MRI   • MR NECK ANGIO WO IV CONTRAST  2023    MR NECK ANGIO WO IV CONTRAST 2023 [facility] MRI   • OTHER SURGICAL HISTORY  2013    Breast Surgery Enlargement Procedure Bilateral   • OTHER SURGICAL HISTORY  2013    Breast Surgery Removal Of Mammary Implant Bilateral   • OTHER SURGICAL HISTORY  2022    Cataract surgery   •  OTHER SURGICAL HISTORY  04/18/2022    Turbinate reduction   • OTHER SURGICAL HISTORY  04/18/2022    [place] tooth extraction   • OTHER SURGICAL HISTORY  11/20/2019    Cyst excision   • TOTAL ABDOMINAL HYSTERECTOMY  09/25/2013    Total Abdominal Hysterectomy   • TOTAL KNEE ARTHROPLASTY Right 05/22/2023    Dr. Potter      Social History     Socioeconomic History   • Marital status:      Spouse name: Not on file   • Number of children: Not on file   • Years of education: Not on file   • Highest education level: Not on file   Occupational History   • Not on file   Tobacco Use   • Smoking status: Never   • Smokeless tobacco: Never   Substance and Sexual Activity   • Alcohol use: Never   • Drug use: Never   • Sexual activity: Not on file   Other Topics Concern   • Not on file   Social History Narrative   • Not on file     Social Determinants of Health     Financial Resource Strain: Not on file   Food Insecurity: Not on file   Transportation Needs: Not on file   Physical Activity: Not on file   Stress: Not on file   Social Connections: Not on file   Intimate Partner Violence: Not on file   Housing Stability: Not on file     Past Medical History:   Diagnosis Date   • Acute bronchitis due to other specified organisms 01/15/2020    Acute bronchitis due to other specified organisms   • Acute maxillary sinusitis, unspecified 10/10/2017    Acute non-recurrent maxillary sinusitis   • Bitten or stung by nonvenomous insect and other nonvenomous arthropods, initial encounter 07/12/2016    Bedbug bite, initial encounter   • Cervicalgia 02/28/2023   • Encounter for screening for cardiovascular disorders 04/11/2016    Encounter for screening for cardiovascular disorders   • Encounter for screening for diabetes mellitus 04/11/2016    Encounter for screening for diabetes mellitus   • Encounter for screening for lipoid disorders 04/11/2016    Encounter for screening for lipoid disorders   • Encounter for screening for  osteoporosis 03/30/2021    Osteoporosis screening   • Enteroviral vesicular pharyngitis 08/19/2015    Herpangina   • Impacted cerumen, left ear 06/19/2018    Impacted cerumen of left ear   • Impacted cerumen, right ear 08/29/2018    Impacted cerumen of right ear   • Localized enlarged lymph nodes 10/30/2019    Cervical lymphadenopathy   • Localized enlarged lymph nodes 11/30/2016    Lymphadenopathy, inguinal   • Localized swelling, mass and lump, head 09/23/2019    Lump of scalp   • Osteoarthritis 02/28/2023   • Otalgia, right ear 01/11/2016    Right ear pain   • Other acute sinusitis 01/15/2020    Other acute sinusitis, recurrence not specified   • Other fecal abnormalities 11/15/2018    Positive FIT (fecal immunochemical test)   • Other forms of stomatitis 01/25/2017    Oral ulcer   • Other malaise 11/20/2017    Malaise and fatigue   • Other specified disorders of eustachian tube, bilateral 02/23/2018    Dysfunction of both eustachian tubes   • Other specified disorders of eustachian tube, right ear 01/11/2016    Dysfunction of right eustachian tube   • Personal history of diseases of the skin and subcutaneous tissue 01/06/2017    History of impetigo   • Personal history of other diseases of the circulatory system     History of hypertension   • Personal history of other diseases of the digestive system 11/18/2015    History of acute gastritis   • Personal history of other diseases of the respiratory system 08/25/2014    Personal history of acute sinusitis   • Personal history of other diseases of the respiratory system 02/23/2018    History of acute bronchitis   • Personal history of other diseases of the respiratory system 10/31/2016    History of acute sinusitis   • Personal history of other diseases of the respiratory system 12/16/2013    History of acute bronchitis   • Personal history of other infectious and parasitic diseases 01/25/2017    History of candidiasis of mouth   • Personal history of other  specified conditions 06/19/2018    History of dizziness   • Personal history of other specified conditions 12/28/2015    History of fatigue   • Personal history of other specified conditions 11/12/2021    History of weight loss   • Personal history of other specified conditions 01/02/2019    History of urinary frequency   • Personal history of other specified conditions 10/12/2022    History of abnormal weight loss   • Personal history of other specified conditions     History of painful urination   • Personal history of other specified conditions 03/26/2015    History of shortness of breath   • Radiculopathy, cervical region 10/10/2017    Cervical radiculopathy   • Segmental and somatic dysfunction of cervical region 05/03/2017    Cervical somatic dysfunction   • Spondylosis without myelopathy or radiculopathy, cervical region 09/19/2017    Cervical spondylosis   • Sprain of ligaments of lumbar spine, initial encounter 02/23/2018    Lumbar sprain, initial encounter   • Unspecified hearing loss, right ear 08/29/2018    Hearing loss, right   • Urinary tract infection, site not specified 09/13/2021    Acute lower UTI      Family History   Problem Relation Name Age of Onset   • Allergies Mother     • Heart failure Mother     • Mental illness Mother          Review of Systems   Constitutional:  Negative for chills, fatigue and fever.   HENT:  Negative for rhinorrhea and sore throat.    Eyes:  Negative for pain and redness.   Respiratory:  Negative for cough and shortness of breath.    Cardiovascular:  Negative for chest pain and palpitations.   Gastrointestinal:  Negative for abdominal pain and blood in stool.   Endocrine: Negative for polydipsia and polyuria.   Genitourinary:  Negative for dysuria and hematuria.   Musculoskeletal:  Positive for gait problem. Negative for back pain and neck stiffness.   Skin:  Negative for rash and wound.   Allergic/Immunologic: Negative for environmental allergies and food allergies.    Neurological:  Positive for weakness. Negative for headaches.   Hematological:  Negative for adenopathy. Does not bruise/bleed easily.   Psychiatric/Behavioral:  Negative for hallucinations and suicidal ideas.       There were no vitals taken for this visit.  Physical Exam  Vitals reviewed.   Constitutional:       General: She is not in acute distress.     Appearance: She is not ill-appearing.   HENT:      Head: Normocephalic and atraumatic.      Right Ear: Tympanic membrane normal.      Left Ear: Tympanic membrane normal.      Nose: No congestion or rhinorrhea.      Mouth/Throat:      Pharynx: No oropharyngeal exudate or posterior oropharyngeal erythema.   Eyes:      Extraocular Movements: Extraocular movements intact.      Conjunctiva/sclera: Conjunctivae normal.      Pupils: Pupils are equal, round, and reactive to light.   Cardiovascular:      Rate and Rhythm: Normal rate and regular rhythm.      Heart sounds: No murmur heard.     No friction rub. No gallop.   Pulmonary:      Effort: Pulmonary effort is normal.      Breath sounds: Normal breath sounds. No wheezing, rhonchi or rales.   Abdominal:      General: There is no distension.      Palpations: Abdomen is soft.      Tenderness: There is no abdominal tenderness. There is no guarding or rebound.   Musculoskeletal:         General: No swelling or deformity.      Cervical back: Normal range of motion and neck supple.      Right lower leg: No edema.      Left lower leg: No edema.   Skin:     Capillary Refill: Capillary refill takes less than 2 seconds.      Coloration: Skin is not jaundiced.      Findings: No rash.   Neurological:      General: No focal deficit present.      Mental Status: She is alert.      Motor: Weakness present.      Gait: Gait abnormal.      Comments: Cogwheel rigidity   Psychiatric:         Mood and Affect: Mood normal.         Behavior: Behavior normal.       Lab Results   Component Value Date    WBC 5.4 08/21/2023    HGB 11.9 (L)  "08/21/2023    HCT 38.3 08/21/2023    MCV 96 08/21/2023     08/21/2023     Lab Results   Component Value Date    CHOL 181 08/09/2023    CHOL 258 (H) 02/08/2022    CHOL 250 (H) 03/30/2021     Lab Results   Component Value Date    HDL 77.9 08/09/2023    HDL 96.6 02/08/2022    HDL 91.6 03/30/2021     No results found for: \"LDLCALC\"  Lab Results   Component Value Date    TRIG 92 08/09/2023    TRIG 75 02/08/2022    TRIG 86 03/30/2021     No components found for: \"CHOLHDL\"  Lab Results   Component Value Date    HGBA1C 5.2 08/09/2023       Assessment/Plan  Problem List Items Addressed This Visit       Benign essential hypertension    Overview     Patient refuses lisinopril. Start amolodipine 5 mg daily patient to followup with  Dr. Waldrop and 2-3 weeks. Discussed side effects         Arthritis    Parkinson's disease (CMS/[facility])         Electronically Signed By: Jean Claude Mason MD   8/22/23  5:26 PM  "

## 2023-08-22 PROBLEM — G20.A1 PARKINSON'S DISEASE (MULTI): Status: ACTIVE | Noted: 2023-08-22

## 2023-08-22 ASSESSMENT — ENCOUNTER SYMPTOMS
SHORTNESS OF BREATH: 0
HEADACHES: 0
PALPITATIONS: 0
BRUISES/BLEEDS EASILY: 0
WOUND: 0
WEAKNESS: 1
ABDOMINAL PAIN: 0
HALLUCINATIONS: 0
POLYDIPSIA: 0
BLOOD IN STOOL: 0
FEVER: 0
SORE THROAT: 0
COUGH: 0
ADENOPATHY: 0
EYE REDNESS: 0
CHILLS: 0
HEMATURIA: 0
NECK STIFFNESS: 0
EYE PAIN: 0
BACK PAIN: 0
FATIGUE: 0
DYSURIA: 0
RHINORRHEA: 0

## 2023-08-22 NOTE — PROGRESS NOTES
Kaitlin Robles is a 78 y.o. female with Chief Complaint of SNF ([facility]) H&P    Resident seen 2023 -- MP    CC: SNF ([facility]) recheck    : 1945  SNF H&P done 23, 23 (EPIC)  Allergy: Ampicillin, ASA< Cefdinir, Gabapentin, I131, Losartan, Meloxicam, MSO4, Celexa, Demerol, Levaquin, MOtrin, PCN, Statin, Sulfa, Nuts, Shell fish, Baclofen, Viibryd  DNR-CC    S: 79 yo woman with OA s/p Rt TKA 2023, MDD, CAD, HTN and GERD admitted to SNF rehab after hospitalization for weaknes and new dx of PD -- tolerating sinemet. No cp/sob. Med list & Problem list reviewed.    NON SMOKER  No Fam Hx CAD/DM    O: VSS AFEB 107# (up 3# from 2023) Awake, alert, NAD. Chest cta. Heart irregular rhythm. Ext mild cogwheeling. No c/c/e.    LAB (23) Na 139, Cr 0.81, Hgb 11.9    A/P:  # Weakness/PD: Sinemet. F/U Neuro Dr Ro. SNF PT/OT prior to returning home with home PT/OT ordered for help with transition to home with homebound status.  # Right knee OA s/p TKA: 2023.  # HTN: permissive HTN with high normal pulse rate as patient refuses all bp meds.  # CAD: PLAVIX. no asa nor statin. DNR CC.    Past Surgical History:   Procedure Laterality Date    BREAST LUMPECTOMY  2013    Breast Surgery Lumpectomy    COLONOSCOPY  2015    Complete Colonoscopy    ESOPHAGOGASTRODUODENOSCOPY  2016    Diagnostic Esophagogastroduodenoscopy    KNEE ARTHROSCOPY W/ DEBRIDEMENT  2013    Knee Arthroscopy (Therapeutic)    MR HEAD ANGIO WO IV CONTRAST  2023    MR HEAD ANGIO WO IV CONTRAST 2023 [facility]    MR NECK ANGIO WO IV CONTRAST  2023    MR NECK ANGIO WO IV CONTRAST 2023 [facility]    OTHER SURGICAL HISTORY  2013    Breast Surgery Enlargement Procedure Bilateral    OTHER SURGICAL HISTORY  2013    Breast Surgery Removal Of Mammary Implant Bilateral    OTHER SURGICAL HISTORY  2022    Cataract surgery    OTHER SURGICAL HISTORY  2022    Turbinate reduction    OTHER SURGICAL  HISTORY  04/18/2022    [place] tooth extraction    OTHER SURGICAL HISTORY  11/20/2019    Cyst excision    TOTAL ABDOMINAL HYSTERECTOMY  09/25/2013    Total Abdominal Hysterectomy    TOTAL KNEE ARTHROPLASTY Right 05/22/2023    Dr. Potter      Social History     Socioeconomic History    Marital status:      Spouse name: Not on file    Number of children: Not on file    Years of education: Not on file    Highest education level: Not on file   Occupational History    Not on file   Tobacco Use    Smoking status: Never    Smokeless tobacco: Never   Substance and Sexual Activity    Alcohol use: Never    Drug use: Never    Sexual activity: Not on file   Other Topics Concern    Not on file   Social History Narrative    Not on file     Social Determinants of Health     Financial Resource Strain: Not on file   Food Insecurity: Not on file   Transportation Needs: Not on file   Physical Activity: Not on file   Stress: Not on file   Social Connections: Not on file   Intimate Partner Violence: Not on file   Housing Stability: Not on file     Past Medical History:   Diagnosis Date    Acute bronchitis due to other specified organisms 01/15/2020    Acute bronchitis due to other specified organisms    Acute maxillary sinusitis, unspecified 10/10/2017    Acute non-recurrent maxillary sinusitis    Bitten or stung by nonvenomous insect and other nonvenomous arthropods, initial encounter 07/12/2016    Bedbug bite, initial encounter    Cervicalgia 02/28/2023    Encounter for screening for cardiovascular disorders 04/11/2016    Encounter for screening for cardiovascular disorders    Encounter for screening for diabetes mellitus 04/11/2016    Encounter for screening for diabetes mellitus    Encounter for screening for lipoid disorders 04/11/2016    Encounter for screening for lipoid disorders    Encounter for screening for osteoporosis 03/30/2021    Osteoporosis screening    Enteroviral vesicular pharyngitis 08/19/2015    Herpangina     Impacted cerumen, left ear 06/19/2018    Impacted cerumen of left ear    Impacted cerumen, right ear 08/29/2018    Impacted cerumen of right ear    Localized enlarged lymph nodes 10/30/2019    Cervical lymphadenopathy    Localized enlarged lymph nodes 11/30/2016    Lymphadenopathy, inguinal    Localized swelling, mass and lump, head 09/23/2019    Lump of scalp    Osteoarthritis 02/28/2023    Otalgia, right ear 01/11/2016    Right ear pain    Other acute sinusitis 01/15/2020    Other acute sinusitis, recurrence not specified    Other fecal abnormalities 11/15/2018    Positive FIT (fecal immunochemical test)    Other forms of stomatitis 01/25/2017    Oral ulcer    Other malaise 11/20/2017    Malaise and fatigue    Other specified disorders of eustachian tube, bilateral 02/23/2018    Dysfunction of both eustachian tubes    Other specified disorders of eustachian tube, right ear 01/11/2016    Dysfunction of right eustachian tube    Personal history of diseases of the skin and subcutaneous tissue 01/06/2017    History of impetigo    Personal history of other diseases of the circulatory system     History of hypertension    Personal history of other diseases of the digestive system 11/18/2015    History of acute gastritis    Personal history of other diseases of the respiratory system 08/25/2014    Personal history of acute sinusitis    Personal history of other diseases of the respiratory system 02/23/2018    History of acute bronchitis    Personal history of other diseases of the respiratory system 10/31/2016    History of acute sinusitis    Personal history of other diseases of the respiratory system 12/16/2013    History of acute bronchitis    Personal history of other infectious and parasitic diseases 01/25/2017    History of candidiasis of mouth    Personal history of other specified conditions 06/19/2018    History of dizziness    Personal history of other specified conditions 12/28/2015    History of fatigue     Personal history of other specified conditions 11/12/2021    History of weight loss    Personal history of other specified conditions 01/02/2019    History of urinary frequency    Personal history of other specified conditions 10/12/2022    History of abnormal weight loss    Personal history of other specified conditions     History of painful urination    Personal history of other specified conditions 03/26/2015    History of shortness of breath    Radiculopathy, cervical region 10/10/2017    Cervical radiculopathy    Segmental and somatic dysfunction of cervical region 05/03/2017    Cervical somatic dysfunction    Spondylosis without myelopathy or radiculopathy, cervical region 09/19/2017    Cervical spondylosis    Sprain of ligaments of lumbar spine, initial encounter 02/23/2018    Lumbar sprain, initial encounter    Unspecified hearing loss, right ear 08/29/2018    Hearing loss, right    Urinary tract infection, site not specified 09/13/2021    Acute lower UTI      Family History   Problem Relation Name Age of Onset    Allergies Mother      Heart failure Mother      Mental illness Mother          Review of Systems   Constitutional:  Negative for chills, fatigue and fever.   HENT:  Negative for rhinorrhea and sore throat.    Eyes:  Negative for pain and redness.   Respiratory:  Negative for cough and shortness of breath.    Cardiovascular:  Negative for chest pain and palpitations.   Gastrointestinal:  Negative for abdominal pain and blood in stool.   Endocrine: Negative for polydipsia and polyuria.   Genitourinary:  Negative for dysuria and hematuria.   Musculoskeletal:  Positive for gait problem. Negative for back pain and neck stiffness.   Skin:  Negative for rash and wound.   Allergic/Immunologic: Negative for environmental allergies and food allergies.   Neurological:  Positive for weakness. Negative for headaches.   Hematological:  Negative for adenopathy. Does not bruise/bleed easily.    Psychiatric/Behavioral:  Negative for hallucinations and suicidal ideas.       There were no vitals taken for this visit.  Physical Exam  Vitals reviewed.   Constitutional:       General: She is not in acute distress.     Appearance: She is not ill-appearing.   HENT:      Head: Normocephalic and atraumatic.      Right Ear: Tympanic membrane normal.      Left Ear: Tympanic membrane normal.      Nose: No congestion or rhinorrhea.      Mouth/Throat:      Pharynx: No oropharyngeal exudate or posterior oropharyngeal erythema.   Eyes:      Extraocular Movements: Extraocular movements intact.      Conjunctiva/sclera: Conjunctivae normal.      Pupils: Pupils are equal, round, and reactive to light.   Cardiovascular:      Rate and Rhythm: Normal rate and regular rhythm.      Heart sounds: No murmur heard.     No friction rub. No gallop.   Pulmonary:      Effort: Pulmonary effort is normal.      Breath sounds: Normal breath sounds. No wheezing, rhonchi or rales.   Abdominal:      General: There is no distension.      Palpations: Abdomen is soft.      Tenderness: There is no abdominal tenderness. There is no guarding or rebound.   Musculoskeletal:         General: No swelling or deformity.      Cervical back: Normal range of motion and neck supple.      Right lower leg: No edema.      Left lower leg: No edema.   Skin:     Capillary Refill: Capillary refill takes less than 2 seconds.      Coloration: Skin is not jaundiced.      Findings: No rash.   Neurological:      General: No focal deficit present.      Mental Status: She is alert.      Motor: Weakness present.      Gait: Gait abnormal.      Comments: Cogwheel rigidity   Psychiatric:         Mood and Affect: Mood normal.         Behavior: Behavior normal.       Lab Results   Component Value Date    WBC 5.4 08/21/2023    HGB 11.9 (L) 08/21/2023    HCT 38.3 08/21/2023    MCV 96 08/21/2023     08/21/2023     Lab Results   Component Value Date    CHOL 181 08/09/2023     "CHOL 258 (H) 02/08/2022    CHOL 250 (H) 03/30/2021     Lab Results   Component Value Date    HDL 77.9 08/09/2023    HDL 96.6 02/08/2022    HDL 91.6 03/30/2021     No results found for: \"LDLCALC\"  Lab Results   Component Value Date    TRIG 92 08/09/2023    TRIG 75 02/08/2022    TRIG 86 03/30/2021     No components found for: \"CHOLHDL\"  Lab Results   Component Value Date    HGBA1C 5.2 08/09/2023       Assessment/Plan   Problem List Items Addressed This Visit       Benign essential hypertension    Overview     Patient refuses lisinopril. Start amolodipine 5 mg daily patient to followup with  Dr. Waldrop and 2-3 weeks. Discussed side effects         Arthritis    Parkinson's disease (CMS/[facility])       "

## 2023-08-23 ENCOUNTER — NURSING HOME VISIT (OUTPATIENT)
Dept: POST ACUTE CARE | Facility: EXTERNAL LOCATION | Age: 78
End: 2023-08-23
Payer: MEDICARE

## 2023-08-23 DIAGNOSIS — G89.29 CHRONIC LOW BACK PAIN, UNSPECIFIED BACK PAIN LATERALITY, UNSPECIFIED WHETHER SCIATICA PRESENT: ICD-10-CM

## 2023-08-23 DIAGNOSIS — G45.9 TIA (TRANSIENT ISCHEMIC ATTACK): ICD-10-CM

## 2023-08-23 DIAGNOSIS — E56.9 VITAMIN DEFICIENCY: ICD-10-CM

## 2023-08-23 DIAGNOSIS — J31.0 CHRONIC RHINITIS: ICD-10-CM

## 2023-08-23 DIAGNOSIS — M54.50 CHRONIC LOW BACK PAIN, UNSPECIFIED BACK PAIN LATERALITY, UNSPECIFIED WHETHER SCIATICA PRESENT: ICD-10-CM

## 2023-08-23 DIAGNOSIS — I10 BENIGN ESSENTIAL HYPERTENSION: ICD-10-CM

## 2023-08-23 DIAGNOSIS — G20.A1 PARKINSON'S DISEASE (MULTI): Primary | ICD-10-CM

## 2023-08-23 DIAGNOSIS — I25.10 ATHEROSCLEROSIS OF CORONARY ARTERY, UNSPECIFIED VESSEL OR LESION TYPE, UNSPECIFIED WHETHER ANGINA PRESENT, UNSPECIFIED WHETHER NATIVE OR TRANSPLANTED HEART: ICD-10-CM

## 2023-08-23 DIAGNOSIS — L30.9 DERMATITIS: ICD-10-CM

## 2023-08-23 PROCEDURE — 99309 SBSQ NF CARE MODERATE MDM 30: CPT | Performed by: NURSE PRACTITIONER

## 2023-08-26 NOTE — PROGRESS NOTES
*Provider Impression*    Patient is a 78 year old female who is seen today for management of multiple medical problems  #Parkinson's / Back pain - PT/OT,  acetaminophen 650mg q6h PRN, sinemet 25/100mg TID, f/u w/ neurology, check XR L spine 2v, add lidocaine 4% patch daily  #HTN / CAD / TIA - Plavix 75mg daily  #Rhinitis / Dermatitis - flonase 50mcg daily, claritin 10mg daily PRN, triamcinolone 0.1% daily PRN, benadryl 25mg q6h PRN  #Vitamin deficiency - vitamin D 2000units daily, vitamin C 1000mg daily, vitamin B complex daily  Follow up as needed     *Chief Complaint*     fall, parkinson's    *History of Present Illness*    Patient is a 79 y/o female w/ PMH as below who who presented to [facility] by EMS s/p mechanical fall.  Admitted for TIA/CVA rule out s/p fall.  Neuro consulted.  CT brain, MRI/MRA head/neck all imaging were negative for ischemic changes.  Patient  was started on sinemet for new dx of parkinson's. She was then discharged to [facility] where she underwent rehab until she transferred [facility] once a bed opened.     Her labs were ok as below.    She is seen sitting up in her room and reports therapy going well, the tremor and rigidity is improved. With regard to her back pain she reports that last month when she went to the podiatrist she hurt her back. Sometimes more tolerable than others, when she sits it gets very achy. No numbness, tingling, paresthesias, no bowel or new bladder incontinence, some rhinitis, dry cough, SOB, wheezing, negative for COVID, no n/v, no swelling, danya negative. No other new c/o presently.     [place] - Ampicillin, Aspirin, Cefdinir, Gabapentin, Iodine I 131 Tositumomab, Losartan, Meloxicam, Morphine, CeleXA, Demerol, Levaquin, Motrin, Penicillins, Statins, Sulfa Antibiotics, Nuts, Shell Fish, BACLOFEN, viibryd   PMH - PAC, HTN, CAD, carotid artery stenosis, fibromyalgia, THANH, GERD, HLD, palpitations, IBS, lumbago, depresison, OA, PVD, compression fractues  [facility]  - breast surgery, cataract surgery, knee arthroscopy, NOMI, turbinate reduction  FH - Mother had CHF  SocHx -  former EtOH, Never smoker    *Review of Systems*  All other systems reviewed are negative except as noted in the HPI     *Vital Signs*   Date: 8/23/23  - T: 97.9  P: 81  R: 16  BP: 137/73  SpO2: 96% on RA      *Results / Data*  CBC - Date: 8/21/23  WBC: 5.4  HGB: 11.9  HCT: 38.3  PLT: 319  ;   BMP - Date: 8/21/23  Na: 139  K: 3.9  Cl: 102  CO2: 30  BUN: 15  Cr: 0.81  Glu: 88  Ca: 9.4  ;   LFT - Date: 8/12/23  AST: 11  ALT: <3  ALP: 53  TBili: 0.4  ;   Coags - Date:   INR:   PT:    *Physical Exam*  Gen: (+) NAD, (+) well-appearing  HEENT: (+) normocephalic, (+) MMM  Neck: (+) supple  Lungs: (+) CTAB, (-) wheezes, (-) rales, (-) rhonchi  Heart: (+) RRR, (+) S1 S2, (+) 2/6 JUAN  Pulses: (+) palpable  Abd: (+) soft, (+) NT, (+) ND, (+) BS+  Ext: (-) edema, (-) deformity, (-) Farheen  MSK: (-) joint swelling  Skin: (+) warm, (+) dry, (-) rash  Neuro: (+) follows commands, (-) tremor, (+) alert

## 2023-09-07 ENCOUNTER — OFFICE VISIT (OUTPATIENT)
Dept: PRIMARY CARE | Facility: CLINIC | Age: 78
End: 2023-09-07
Payer: MEDICARE

## 2023-09-07 VITALS
SYSTOLIC BLOOD PRESSURE: 132 MMHG | BODY MASS INDEX: 17.82 KG/M2 | WEIGHT: 103.8 LBS | DIASTOLIC BLOOD PRESSURE: 82 MMHG | OXYGEN SATURATION: 100 % | HEART RATE: 69 BPM

## 2023-09-07 VITALS — WEIGHT: 100.53 LBS | HEIGHT: 64 IN | BODY MASS INDEX: 17.16 KG/M2

## 2023-09-07 DIAGNOSIS — J30.1 NON-SEASONAL ALLERGIC RHINITIS DUE TO POLLEN: ICD-10-CM

## 2023-09-07 DIAGNOSIS — R26.81 UNSTEADY GAIT: ICD-10-CM

## 2023-09-07 DIAGNOSIS — G20.A1 PARKINSON'S DISEASE (MULTI): Primary | ICD-10-CM

## 2023-09-07 PROBLEM — R29.810 FACIAL DROOP: Status: RESOLVED | Noted: 2023-09-07 | Resolved: 2023-09-07

## 2023-09-07 PROBLEM — K29.90 GASTRODUODENITIS: Status: RESOLVED | Noted: 2023-09-07 | Resolved: 2023-09-07

## 2023-09-07 PROBLEM — K12.30 ORAL MUCOSITIS (ULCERATIVE), UNSPECIFIED: Status: RESOLVED | Noted: 2023-09-07 | Resolved: 2023-09-07

## 2023-09-07 PROCEDURE — 1036F TOBACCO NON-USER: CPT | Performed by: FAMILY MEDICINE

## 2023-09-07 PROCEDURE — 99214 OFFICE O/P EST MOD 30 MIN: CPT | Performed by: FAMILY MEDICINE

## 2023-09-07 PROCEDURE — 1160F RVW MEDS BY RX/DR IN RCRD: CPT | Performed by: FAMILY MEDICINE

## 2023-09-07 PROCEDURE — 3079F DIAST BP 80-89 MM HG: CPT | Performed by: FAMILY MEDICINE

## 2023-09-07 PROCEDURE — 3075F SYST BP GE 130 - 139MM HG: CPT | Performed by: FAMILY MEDICINE

## 2023-09-07 PROCEDURE — 1159F MED LIST DOCD IN RCRD: CPT | Performed by: FAMILY MEDICINE

## 2023-09-07 RX ORDER — CARBIDOPA AND LEVODOPA 25; 100 MG/1; MG/1
1 TABLET, ORALLY DISINTEGRATING ORAL 3 TIMES DAILY
COMMUNITY
End: 2023-09-26 | Stop reason: SDUPTHER

## 2023-09-07 RX ORDER — ACETAMINOPHEN 500 MG
50 TABLET ORAL DAILY
COMMUNITY
End: 2023-09-27 | Stop reason: SDUPTHER

## 2023-09-07 RX ORDER — MULTIVITAMIN/IRON/FOLIC ACID 18MG-0.4MG
1 TABLET ORAL DAILY
COMMUNITY
End: 2023-11-03 | Stop reason: SDUPTHER

## 2023-09-07 RX ORDER — IBUPROFEN 100 MG/5ML
1000 SUSPENSION, ORAL (FINAL DOSE FORM) ORAL DAILY
COMMUNITY
End: 2023-09-22

## 2023-09-07 RX ORDER — CLOPIDOGREL BISULFATE 75 MG/1
TABLET ORAL DAILY
COMMUNITY
End: 2023-11-03 | Stop reason: SDUPTHER

## 2023-09-07 NOTE — PROGRESS NOTES
Subjective   Patient ID: Kaitlin Robles is a 78 y.o. female who presents for Follow-up (SNF FOLLOW UP).  HPI  Was in the hospital and then in the SNF- diagnosed with parkinson's  No more falls since fell before in hospital  Getting PT and OT at home  Feels like walking better with the Sinemet  Occasional dizziness depending on which way lays  No numbness, CP, SOB  Some issues with memory at times- will lose train of thought  No n/v  Some various aches and pains      Current Outpatient Medications:     b complex 0.4 mg tablet, Take 1 tablet by mouth once daily., Disp: , Rfl:     carbidopa-levodopa (Parcopa)  mg disintegrating tablet, Take 1 tablet by mouth 3 times a day., Disp: , Rfl:     clopidogrel (Plavix) 75 mg tablet, Take by mouth once daily., Disp: , Rfl:     diphenhydramine HCl (BENADRYL ORAL), Take 25 mg by mouth 4 times a day as needed (itching)., Disp: , Rfl:     loratadine (Claritin) 10 mg tablet, TAKE 1 TABLET EVERY MORNING AS NEEDED., Disp: , Rfl:     triamcinolone (Kenalog) 0.1 % cream, APPLY SPARINGLY AND MASSAGE IN TWICE DAILY., Disp: , Rfl:     ascorbic acid (Vitamin C) 1,000 mg tablet, Take 1 tablet (1,000 mg) by mouth once daily., Disp: , Rfl:     cholecalciferol (Vitamin D3) 50 mcg (2,000 unit) capsule, Take 1 capsule (50 mcg) by mouth once daily., Disp: , Rfl:     fluticasone (Flonase) 50 mcg/actuation nasal spray, Administer 2 sprays into each nostril once daily., Disp: , Rfl:    Past Surgical History:   Procedure Laterality Date    BREAST LUMPECTOMY  09/25/2013    Breast Surgery Lumpectomy    COLONOSCOPY  03/26/2015    Complete Colonoscopy    ESOPHAGOGASTRODUODENOSCOPY  03/16/2016    Diagnostic Esophagogastroduodenoscopy    KNEE ARTHROSCOPY W/ DEBRIDEMENT  09/25/2013    Knee Arthroscopy (Therapeutic)    MR HEAD ANGIO WO IV CONTRAST  8/9/2023    MR HEAD ANGIO WO IV CONTRAST 8/9/2023 [facility]    MR NECK ANGIO WO IV CONTRAST  8/9/2023    MR NECK ANGIO WO IV CONTRAST 8/9/2023 [facility]    OTHER  SURGICAL HISTORY  09/25/2013    Breast Surgery Enlargement Procedure Bilateral    OTHER SURGICAL HISTORY  09/25/2013    Breast Surgery Removal Of Mammary Implant Bilateral    OTHER SURGICAL HISTORY  04/18/2022    Cataract surgery    OTHER SURGICAL HISTORY  04/18/2022    Turbinate reduction    OTHER SURGICAL HISTORY  04/18/2022    [place] tooth extraction    OTHER SURGICAL HISTORY  11/20/2019    Cyst excision    TOTAL ABDOMINAL HYSTERECTOMY  09/25/2013    Total Abdominal Hysterectomy    TOTAL KNEE ARTHROPLASTY Right 05/22/2023    Dr. Potter      Past Medical History:   Diagnosis Date    Acute bronchitis due to other specified organisms 01/15/2020    Acute bronchitis due to other specified organisms    Acute maxillary sinusitis, unspecified 10/10/2017    Acute non-recurrent maxillary sinusitis    Bitten or stung by nonvenomous insect and other nonvenomous arthropods, initial encounter 07/12/2016    Bedbug bite, initial encounter    Cervicalgia 02/28/2023    Encounter for screening for cardiovascular disorders 04/11/2016    Encounter for screening for cardiovascular disorders    Encounter for screening for diabetes mellitus 04/11/2016    Encounter for screening for diabetes mellitus    Encounter for screening for lipoid disorders 04/11/2016    Encounter for screening for lipoid disorders    Encounter for screening for osteoporosis 03/30/2021    Osteoporosis screening    Enteroviral vesicular pharyngitis 08/19/2015    Herpangina    Facial droop 09/07/2023    Gastroduodenitis 09/07/2023    Impacted cerumen, left ear 06/19/2018    Impacted cerumen of left ear    Impacted cerumen, right ear 08/29/2018    Impacted cerumen of right ear    Localized enlarged lymph nodes 10/30/2019    Cervical lymphadenopathy    Localized enlarged lymph nodes 11/30/2016    Lymphadenopathy, inguinal    Localized swelling, mass and lump, head 09/23/2019    Lump of scalp    Oral mucositis (ulcerative), unspecified 09/07/2023    Osteoarthritis  02/28/2023    Otalgia, right ear 01/11/2016    Right ear pain    Other acute sinusitis 01/15/2020    Other acute sinusitis, recurrence not specified    Other fecal abnormalities 11/15/2018    Positive FIT (fecal immunochemical test)    Other forms of stomatitis 01/25/2017    Oral ulcer    Other malaise 11/20/2017    Malaise and fatigue    Other specified disorders of eustachian tube, bilateral 02/23/2018    Dysfunction of both eustachian tubes    Other specified disorders of eustachian tube, right ear 01/11/2016    Dysfunction of right eustachian tube    Personal history of diseases of the skin and subcutaneous tissue 01/06/2017    History of impetigo    Personal history of other diseases of the circulatory system     History of hypertension    Personal history of other diseases of the digestive system 11/18/2015    History of acute gastritis    Personal history of other diseases of the respiratory system 08/25/2014    Personal history of acute sinusitis    Personal history of other diseases of the respiratory system 02/23/2018    History of acute bronchitis    Personal history of other diseases of the respiratory system 10/31/2016    History of acute sinusitis    Personal history of other diseases of the respiratory system 12/16/2013    History of acute bronchitis    Personal history of other infectious and parasitic diseases 01/25/2017    History of candidiasis of mouth    Personal history of other specified conditions 06/19/2018    History of dizziness    Personal history of other specified conditions 12/28/2015    History of fatigue    Personal history of other specified conditions 11/12/2021    History of weight loss    Personal history of other specified conditions 01/02/2019    History of urinary frequency    Personal history of other specified conditions 10/12/2022    History of abnormal weight loss    Personal history of other specified conditions     History of painful urination    Personal history of  other specified conditions 03/26/2015    History of shortness of breath    Radiculopathy, cervical region 10/10/2017    Cervical radiculopathy    Segmental and somatic dysfunction of cervical region 05/03/2017    Cervical somatic dysfunction    Spondylosis without myelopathy or radiculopathy, cervical region 09/19/2017    Cervical spondylosis    Sprain of ligaments of lumbar spine, initial encounter 02/23/2018    Lumbar sprain, initial encounter    Unspecified hearing loss, right ear 08/29/2018    Hearing loss, right    Urinary tract infection, site not specified 09/13/2021    Acute lower UTI     Social History     Tobacco Use    Smoking status: Never    Smokeless tobacco: Never   Substance Use Topics    Alcohol use: Never    Drug use: Never      Family History   Problem Relation Name Age of Onset    Allergies Mother      Heart failure Mother      Mental illness Mother        Review of Systems    Objective   /82   Pulse 69   Wt 47.1 kg (103 lb 12.8 oz)   SpO2 100%   BMI 17.83 kg/m²    Physical Exam  Vitals and nursing note reviewed.   Constitutional:       Appearance: Normal appearance.   HENT:      Head: Normocephalic and atraumatic.      Right Ear: Tympanic membrane, ear canal and external ear normal. Decreased hearing noted.      Left Ear: Tympanic membrane, ear canal and external ear normal. Decreased hearing noted.      Ears:      Comments: Purulent effusion     Nose:      Right Turbinates: Pale.      Left Turbinates: Pale.      Mouth/Throat:      Mouth: Mucous membranes are moist.      Pharynx: Oropharynx is clear.   Eyes:      Extraocular Movements: Extraocular movements intact.      Conjunctiva/sclera: Conjunctivae normal.      Pupils: Pupils are equal, round, and reactive to light.   Cardiovascular:      Rate and Rhythm: Normal rate and regular rhythm.      Pulses: Normal pulses.      Heart sounds: Normal heart sounds.   Pulmonary:      Effort: Pulmonary effort is normal.      Breath sounds:  Normal breath sounds.   Abdominal:      General: Abdomen is flat. Bowel sounds are normal.      Palpations: Abdomen is soft.      Tenderness: There is no abdominal tenderness. There is no right CVA tenderness or left CVA tenderness.   Musculoskeletal:      Cervical back: Normal range of motion and neck supple.   Lymphadenopathy:      Cervical: No cervical adenopathy.   Skin:     Capillary Refill: Capillary refill takes less than 2 seconds.   Neurological:      General: No focal deficit present.      Mental Status: She is alert and oriented to person, place, and time.      Gait: Gait abnormal.   Psychiatric:         Mood and Affect: Mood normal.         Behavior: Behavior normal.       Assessment/Plan   Problem List Items Addressed This Visit       Allergic rhinitis    Parkinson's disease (CMS/HCC) - Primary   Parkinson's- sinemet, to neurology    AR- continue meds, avoid allergens    Unsteady Gait- PT/OT, walker    Patient understands and agrees with treatment plan    I compared the medication list I received from the hospital/ECF on this patient to the EMR medication list and updated the EMR medication list      Tenzin Waldrop DO     Patient was identified as a fall risk. Risk prevention instructions provided.

## 2023-09-19 DIAGNOSIS — J32.4 CHRONIC PANSINUSITIS: Primary | ICD-10-CM

## 2023-09-22 RX ORDER — IBUPROFEN 100 MG/5ML
1000 SUSPENSION, ORAL (FINAL DOSE FORM) ORAL DAILY
Qty: 90 TABLET | Refills: 3 | Status: SHIPPED | OUTPATIENT
Start: 2023-09-22 | End: 2024-09-21

## 2023-09-26 DIAGNOSIS — G20.A1 PARKINSON'S DISEASE (MULTI): Primary | ICD-10-CM

## 2023-09-27 DIAGNOSIS — E55.9 VITAMIN D DEFICIENCY: Primary | ICD-10-CM

## 2023-09-27 RX ORDER — ACETAMINOPHEN 500 MG
50 TABLET ORAL DAILY
Qty: 90 CAPSULE | Refills: 3 | Status: SHIPPED | OUTPATIENT
Start: 2023-09-27 | End: 2024-09-26

## 2023-09-27 RX ORDER — CARBIDOPA AND LEVODOPA 25; 100 MG/1; MG/1
1 TABLET, ORALLY DISINTEGRATING ORAL 3 TIMES DAILY
Qty: 270 TABLET | Refills: 3 | Status: SHIPPED | OUTPATIENT
Start: 2023-09-27 | End: 2023-09-29 | Stop reason: ALTCHOICE

## 2023-09-29 DIAGNOSIS — G20.A1 PARKINSON'S DISEASE (MULTI): Primary | ICD-10-CM

## 2023-09-29 RX ORDER — CARBIDOPA AND LEVODOPA 25; 100 MG/1; MG/1
1 TABLET ORAL 3 TIMES DAILY
Qty: 270 TABLET | Refills: 3 | Status: SHIPPED | OUTPATIENT
Start: 2023-09-29 | End: 2023-11-15 | Stop reason: WASHOUT

## 2023-09-30 NOTE — PROGRESS NOTES
Consult Type: subsequent visit/care     Service: Medicine     Subjective Data:   ANDREI LUDWIG is a 78 year old Female who is Hospital Day # 2 and POD #1 for 1. Right total knee arthroplasty ;2. ;3. ;4. ;5.     Seen and examined in her room this AM. Awake and alert. Up in chair. No new complaints. She denies chest pain, breathing difficulties, abdominal pain, N/V/D/C. No fever or chills. Agreeable to starting  low-dose anti-hypertensive agent.    Objective Data:     Objective Information:      T   P  R  BP   MAP  SpO2   Value  36.1  105  18  139/79   106  96%  Date/Time 5/23 5:08 5/23 5:08 5/23 5:08 5/23 0:00  5/22 17:19 5/23 5:08  Range  (36.1C - 36.3C )  (104 - 125 )  (16 - 18 )  (139 - 201 )/ (79 - 122 )  (106 - 148 )  (94% - 96% )      Pain reported at 5/23 11:05: 5 = Moderate    Physical Exam Narrative:  ·  Physical Exam:    General: A&O x 3; NAD; calm and cooperative; thin  Eyes: EOM's intact. Clear sclera.   HEENT: Atraumatic. Normocephalic. Mucous membranes moist.   Lungs: CTAB; respirations even and unlabored on room air.   Heart: Regular rate;   Abdomen:  Soft, non-tender, non-distended; normoactive bowel sounds  Extremities: WRIGHT with RLE weakness/pain; no edema; peripheral pulses intact. Right leg ACE wrapped - C/D/I.   Neuro: A&O x 3; gross motor and sensation intact; no focal deficits  Skin: Warm, dry, and intact  Psych: Appropriate mood and behavior     Medication:    Medications:          Continuous Medications       --------------------------------    1. Lactated Ringers Infusion:  1000  mL  IntraVenous  <Continuous>         Scheduled Medications       --------------------------------    1. Acetaminophen:  975  mg  Oral  Every 6 Hours    2. Apixaban:  2.5  mg  Oral  Every 12 Hours    3. Carvedilol:  3.125  mg  Oral  2 Times a Day    4. Cyclobenzaprine:  10  mg  Oral  3 Times a Day    5. Docusate:  100  mg  Oral  2 Times a Day    6. Fluticasone 50 microgram/ Nasal Inhalation:  2  spray(s)   Each Nostril  Daily    7. Ketorolac Injectable:  15  mg  IntraVenous Push  Every 6 Hours    8. Polyethylene Glycol:  17  gram(s)  Oral  2 Times a Day         PRN Medications       --------------------------------    1. diphenhydrAMINE:  25  mg  Oral  Every 24 Hours    2. hydrALAZINE (APRESOLINE) Injectable:  10  mg  IntraVenous Push  Every 4 Hours    3. Ondansetron Injectable:  4  mg  IntraVenous Push  Every 6 Hours    4. traMADol:  50  mg  Oral  Every 6 Hours    5. traMADol:  100  mg  Oral  Every 6 Hours        Recent Lab Results:    Results:    CBC: 5/23/2023 05:21              \     Hgb     /                              \     11.6 L    /  WBC  ----------------  Plt               12.5 H    ----------------    231              /     Hct     \                              /     36.0       \            RBC: 3.77 L    MCV: 95           BMP: 5/23/2023 05:21  NA+        Cl-     BUN  /                         135 L   101    12  /  --------------------------------  Glucose                ---------------------------  114 H    K+     HCO3-   Creat \                         4.2  26    0.78  \  Calcium : 9.2     Anion Gap : 12      Radiology Results:    Results:        Impression:    1. Status post right total knee arthroplasty with satisfactory  hardware alignment..     Xray Knee 1 or 2 View [May 22 2023 12:35PM]      Assessment and Plan:   Code Status:  ·  Code Status Full Code     Assessment:    78 year old Female with a medical history of HTN, CAD, and OA who presented to [facility] for an elective right TKA by Dr. Potter. EBL 25ml. Had some pre/perioperative  accelerated HTN.  Consulted for medical management. Seen and examined in her room this afternoon.     Hypertension - Accelerated  -Currently not on agent for HTN (multiple allergies/intolerances)  -BP improved after given IV Hydralazine/Labetolol given  -Agreeable to starting a low dose agent: Carvedilol (multiple allergies/intolerances)  -Further  management/monitoring per PCP or attending at [facility]    Right Knee Osteoarthritis  -s/p right total knee arthroscopy by Dr. Potter on 5/22  -Incisional care, dressing changes, prophylactic antibiotics, and pain management per Orthopedics.   -PT/OT consulted. WBAT.   -Medicate for pain as needed.   -Advised IS use, mobilization.   -Maintain bowel regimen  -DVT prophylaxis per surgery  -Monitor H&H for ABLA. Hgb stable at 11.6.  -Afebrile. Mild leukocytosis.     CV: CAD, PVD, LIT  -Allergy to statins/aspirin    Depression/Anxiety/Fibromyalgia  -Supportive care    DVT Prophylaxis   -Apixaban per orthopedics    Fluids/Electrolytes/Nutrition  -Laboratory data reviewed.  -Electrolytes stable.   -No nutritional concerns at this time. Does report progressive, unintentional weight loss. Advised to discuss with PCP for further workup if persists to r/o underlying malignancy, etc.     Disposition  -Plan of care discussed with medicine, Dr. Horn.   -Discharge per attending. Medically stable.       Electronic Signatures:  Natasha Lopes (APRN-CNP)  (Signed 23-May-2023 11:25)   Authored: Service, Subjective Data, Objective Data, Assessment  and Plan, Note Completion      Last Updated: 23-May-2023 11:25 by Natasha Lopes (APRN-CNP)

## 2023-09-30 NOTE — H&P
History of Present Illness:   History Present Illness:  Reason for surgery: Right total knee arthoplasty   HPI:    ANDREI LUDWIG is a 78 year old Female  referred to PAT by Dr. Potter anticipating a R TKA     Surgery Date: 5/22/23    Patient with history of R knee pain. Scheduled in 8/2022 but had positive COVID test. ambulates with walker and wheelchair.   Patient reports pain in PAT 6/10.       Allergies:        Allergies:  ·  aspirin : Resp Distress  ·  Levaquin : Unknown  ·  Motrin : Resp Distress  ·  ampicillin : Itching  ·  baclofen : Resp Distress, Itching  ·  Celexa : Unknown  ·  Viibryd : Unknown  ·  Demerol : Unknown  ·  iodine : Other  ·  cefdinir : Unknown  ·  gabapentin : Other, Itching  ·  morphine : Unknown  ·  statins : Other  ·  penicillins : Resp Distress  ·  sulfa  drugs: Anaphylaxis  ·  Shell  Fish: Unknown  ·  Nuts : Unknown       Intolerances:  ·  losartan : Myalgia  ·  meloxicam : Fatigue    Home Medication Review:   Home Medications Reviewed: yes     Impression/Procedure:   ·  Impression and Planned Procedure: Right knee OA- R TKA       ERAS (Enhanced Recovery After Surgery):  ·  ERAS Patient: no       Physical Exam by System:    Respiratory/Thorax: Patent airways, normal audible  breath sounds. No increased work of breathing   Cardiovascular: 2+ equal pulses of the extremities     Consent:   COVID-19 Consent:  ·  COVID-19 Risk Consent Surgeon has reviewed key risks related to the risk of ana COVID-19 and if they contract COVID-19 what the risks are.     Attestation:   Note Completion:  I am a:  Resident/Fellow   Attending Attestation I saw and evaluated the patient.  I personally obtained the key and critical portions of the history and physical exam or was physically present for key and  critical portions performed by the resident/fellow. I reviewed the resident/fellow?s documentation and discussed the patient with the resident/fellow.  I agree with the resident/fellow?s  medical decision making as documented in the note.     I personally evaluated the patient on 22-May-2023         Electronic Signatures:  Alcides Murillo (DO (Resident))  (Signed 22-May-2023 01:27)   Authored: History of Present Illness, Allergies, Home  Medication Review, Impression/Procedure, ERAS, Physical Exam, Consent, Note Completion  Willie Potter)  (Signed 22-May-2023 07:28)   Authored: Note Completion   Co-Signer: History of Present Illness, Allergies, Home Medication Review, Impression/Procedure, ERAS, Physical Exam, Consent, Note Completion      Last Updated: 22-May-2023 07:28 by Willie Potter)

## 2023-09-30 NOTE — DISCHARGE SUMMARY
Send Summary:   Discharge Summary Providers:  Provider Role Provider Name   · Attending Willie Potter   · Referring Willie Potter   · Consulting Nathaly Horn   · Consulting Glenn Gonzalez   · Primary Tenzin Waldrop       Note Recipients: Tenzin Waldrop,  - [phone]  [Preferred]  Willie Potter MD       Discharge:    Summary:   Admission Date: .22-May-2023 06:50:00   Discharge Date: 23-May-2023   Attending Physician at Discharge: Willie Potter   Admission Reason: Right Knee Osteoarthritis (1)   Final Discharge Diagnoses: Status post total right  knee replacement   Procedures: Date: 22-May-2023 10:26:00  Procedure Name: 1. Right total knee arthroplasty   2.   3.   4.   5.   Condition at Discharge: Satisfactory   Disposition at Discharge: Skilled Nursing Facility  (SNF)   Vital Signs:        T   P  R  BP   MAP  SpO2   Value  36.1  105  18  139/79   106  96%  Date/Time 5/23 5:08 5/23 5:08 5/23 5:08 5/23 0:00  5/22 17:19 5/23 5:08  Range  (36.1C - 36.3C )  (104 - 125 )  (16 - 18 )  (139 - 201 )/ (79 - 122 )  (106 - 148 )  (94% - 96% )    Date:            Weight/Scale Type:  Height:   22-May-2023 13:40  45.6  kg / bed  162.5  cm  Hospital Course:    Patient was admitted s/p right TKA. Hospital stay has been unremarkable. Pain was controlled w/ PO pain medication. Patient was sent home with prescriptions for pain,  AC, and other appropriate medications. All services caring for the patient were in agreement about post hospitalization care at time of discharge.       Discharge Information:    and Continuing Care:   Lab Results - Pending:    None  Radiology Results - Pending: None   Discharge Instructions:    Activity:           activity as tolerated.          May shower..            May not drive.            Weight-bearing Instructions: weight-bearing as tolerated right leg.            Light activity. Frequent rest with leg elevated and Ice. Continue exercises to increase range of  motion. Work on getting the knee straight.  Total Knee Replacement Precautions for 6 weeks: NO running, NO jumping, NO squatting, NO kneeling, NO twisting, NO crossing on operative knee.   Continue to wear compression stockings everyday. May take stockings off at night to sleep but reapply each morning for the next 2 weeks until seen by your surgeon at your follow up appt.    Nutrition/Diet:           resume normal diet    Wound Care:           Wound Site:   Right Total Knee Replacement          Wound Type:   surgical incision          Cover With:   Mepilex          Instructions:   no lotions, creams, or tub soaks          Other Instructions:   Remove the ace wrap and padding underneath 2 days after surgery.  Do not remove Mepilex AG dressing from the knee it will stay in place  until your follow up visit in 2 weeks with surgeon. If dressing gets soiled/wet remove  immediately and notify the surgeon.   Call the office if having increased redness, pain, swelling or drainage at incision or if you have fever or chills. Continue to use ice to knee often.  May shower. pat dressing dry, no soap, no lotions and  no soaking.    Rehab Services:           Occupational Therapy Orders:   Eval and Treat ([facility])          Physical Therapy Orders:   Eval and Treat ([facility])    Home Care Certification:           Home Care Agency:    Home Team [phone]          Skilled Disciplines Ordered:   PT    Home Care Services:           Home Care Skilled Service:   Rehab (PT/OT/SP eval and treat)    Care Recommendation:           I recommend that INPATIENT care is required at::   Skilled          Estimated Stay:   Convalescent stay < 30 days    Discharge Medications: Home Medication   Flonase 50 mcg/inh nasal spray - 1 spray(s) nasal once a day  Eliquis 2.5 mg oral tablet - 1 tab(s) orally 2 times a day x 30 days   carvedilol 3.125 mg oral tablet - 1 tab(s) orally 2 times a day  traMADol 50 mg  "oral tablet - 1 tab(s) orally every 4 to 6 hours as needed for pain      PRN Medication   Claritin 10 mg oral tablet - 1 tab(s) orally once a day, As Needed  triamcinolone 0.1% topical cream - Apply topically to affected area once a day, As Needed  Benadryl 25 mg oral tablet - 1 tab(s) orally once a day, As Needed     DNR Status:   ·  Code Status Code Status order at time of discharge: Full Code     Attestation:   Note Completion:  I am a:  Resident/Fellow   Attending Attestation I saw and evaluated the patient.  I personally obtained the key and critical portions of the history and physical exam or was physically present for key and  critical portions performed by the resident/fellow. I reviewed the resident/fellow?s documentation and discussed the patient with the resident/fellow.  I agree with the resident/fellow?s medical decision making as documented in the note.     I personally evaluated the patient on 23-May-2023         Electronic Signatures:  Jean Claude Hawk (DO (Resident))  (Signed 23-May-2023 11:59)   Authored: Send Summary, Summary Content, Ongoing Care,  DNR Status, Note Completion  Willie Potter)  (Signed 26-Jun-2023 17:07)   Authored: Note Completion   Co-Signer: Send Summary, Summary Content, Ongoing Care, DNR Status, Note Completion      Last Updated: 26-Jun-2023 17:07 by Willie Potter)    References:  1.  Data Referenced From \"Consult-Medicine\" 22-May-2023 18:14   "

## 2023-10-02 NOTE — OP NOTE
Post Operative Note:     PreOp Diagnosis: Right knee Osteoarthritis   Post-Procedure Diagnosis: Right knee Osteoarthritis   Procedure: 1. Right total knee arthroplasty   2.   3.   4.   5.   Surgeon: Tariq   Resident/Fellow/Other Assistant: Nathan Murillo   Anesthesia: General + spinal   Estimated Blood Loss (mL): 25   Specimen: no   Complications: none   Findings: Right knee Osteoarthritis   Patient Returned To/Condition: PACU in stable condition   Tourniquet Times: <60 minutes   Implants: Depuy PFC     Attestation:   Note Completion:  I am a: Resident/Fellow   Attending Attestation I was present for the entire procedure          Electronic Signatures:  Alcides Murillo (DO (Resident))  (Signed 22-May-2023 10:28)   Authored: Post Operative Note, Note Completion  Willie Potter)  (Signed 22-May-2023 15:31)   Authored: Note Completion   Co-Signer: Post Operative Note, Note Completion      Last Updated: 22-May-2023 15:31 by Willie Potter)    Alert-The patient is alert, awake and responds to voice. The patient is oriented to time, place, and person. The triage nurse is able to obtain subjective information.

## 2023-11-03 ENCOUNTER — OFFICE VISIT (OUTPATIENT)
Dept: PRIMARY CARE | Facility: CLINIC | Age: 78
End: 2023-11-03
Payer: MEDICARE

## 2023-11-03 VITALS
OXYGEN SATURATION: 98 % | DIASTOLIC BLOOD PRESSURE: 98 MMHG | WEIGHT: 110 LBS | SYSTOLIC BLOOD PRESSURE: 148 MMHG | BODY MASS INDEX: 19.49 KG/M2 | HEART RATE: 110 BPM | HEIGHT: 63 IN

## 2023-11-03 DIAGNOSIS — J01.80 ACUTE NON-RECURRENT SINUSITIS OF OTHER SINUS: Primary | ICD-10-CM

## 2023-11-03 DIAGNOSIS — G45.9 TIA (TRANSIENT ISCHEMIC ATTACK): ICD-10-CM

## 2023-11-03 DIAGNOSIS — G20.B2 PARKINSON'S DISEASE WITH DYSKINESIA AND FLUCTUATING MANIFESTATIONS (MULTI): ICD-10-CM

## 2023-11-03 PROCEDURE — 1159F MED LIST DOCD IN RCRD: CPT | Performed by: FAMILY MEDICINE

## 2023-11-03 PROCEDURE — 3077F SYST BP >= 140 MM HG: CPT | Performed by: FAMILY MEDICINE

## 2023-11-03 PROCEDURE — 1160F RVW MEDS BY RX/DR IN RCRD: CPT | Performed by: FAMILY MEDICINE

## 2023-11-03 PROCEDURE — 3080F DIAST BP >= 90 MM HG: CPT | Performed by: FAMILY MEDICINE

## 2023-11-03 PROCEDURE — 99214 OFFICE O/P EST MOD 30 MIN: CPT | Performed by: FAMILY MEDICINE

## 2023-11-03 PROCEDURE — 1036F TOBACCO NON-USER: CPT | Performed by: FAMILY MEDICINE

## 2023-11-03 RX ORDER — CLOPIDOGREL BISULFATE 75 MG/1
75 TABLET ORAL DAILY
Qty: 90 TABLET | Refills: 3 | Status: SHIPPED | OUTPATIENT
Start: 2023-11-03 | End: 2024-05-03 | Stop reason: WASHOUT

## 2023-11-03 RX ORDER — AZITHROMYCIN 250 MG/1
TABLET, FILM COATED ORAL
Qty: 6 TABLET | Refills: 0 | Status: SHIPPED | OUTPATIENT
Start: 2023-11-03 | End: 2023-11-03 | Stop reason: SDUPTHER

## 2023-11-03 RX ORDER — ACETAMINOPHEN 325 MG/1
325 TABLET ORAL EVERY 4 HOURS PRN
COMMUNITY
Start: 2017-01-27

## 2023-11-03 RX ORDER — LOPERAMIDE HCL 2 MG
2 TABLET ORAL AS NEEDED
COMMUNITY
End: 2023-11-15 | Stop reason: WASHOUT

## 2023-11-03 RX ORDER — AZITHROMYCIN 250 MG/1
TABLET, FILM COATED ORAL
Qty: 6 TABLET | Refills: 1 | Status: SHIPPED | OUTPATIENT
Start: 2023-11-03 | End: 2023-11-08

## 2023-11-03 RX ORDER — MULTIVITAMIN/IRON/FOLIC ACID 18MG-0.4MG
1 TABLET ORAL DAILY
Qty: 90 TABLET | Refills: 3 | Status: SHIPPED | OUTPATIENT
Start: 2023-11-03 | End: 2024-11-02

## 2023-11-03 NOTE — PROGRESS NOTES
Subjective   Patient ID: Kailtin Robles is a 78 y.o. female who presents for Sinusitis (Sinus infection ).  HPI  Has been feeling sick for a week  + sinus drainage, cough- NP  + chills  No fever  Some HA, ear pain  Feels like   + NP cough  Runny right eye  Slight SOB  No CP, vomiting, diarrhea  Some nausea    Occasional burning with urination    Current Outpatient Medications:     acetaminophen (TylenoL) 325 mg tablet, Take 1 tablet (325 mg) by mouth every 4 hours if needed., Disp: , Rfl:     ascorbic acid (Vitamin C) 1,000 mg tablet, Take 1 tablet (1,000 mg) by mouth once daily., Disp: 90 tablet, Rfl: 3    azithromycin (Zithromax) 250 mg tablet, Take 2 tablets (500 mg) by mouth once daily for 1 day, THEN 1 tablet (250 mg) once daily for 4 days. Take 2 tabs (500 mg) by mouth today, than 1 daily for 4 days.., Disp: 6 tablet, Rfl: 1    b complex 0.4 mg tablet, Take 1 tablet by mouth once daily., Disp: 90 tablet, Rfl: 3    carbidopa-levodopa (Sinemet)  mg tablet, Take 1 tablet by mouth 3 times a day., Disp: 270 tablet, Rfl: 3    cholecalciferol (Vitamin D3) 50 mcg (2,000 unit) capsule, Take 1 capsule (50 mcg) by mouth once daily., Disp: 90 capsule, Rfl: 3    clopidogrel (Plavix) 75 mg tablet, Take 1 tablet (75 mg) by mouth once daily., Disp: 90 tablet, Rfl: 3    diphenhydramine HCl (BENADRYL ORAL), Take 25 mg by mouth 4 times a day as needed (itching)., Disp: , Rfl:     fluticasone (Flonase) 50 mcg/actuation nasal spray, Administer 2 sprays into each nostril once daily., Disp: , Rfl:     loperamide (Imodium A-D) 2 mg tablet, Take 1 tablet (2 mg) by mouth if needed., Disp: , Rfl:     loratadine (Claritin) 10 mg tablet, TAKE 1 TABLET EVERY MORNING AS NEEDED., Disp: , Rfl:     triamcinolone (Kenalog) 0.1 % cream, APPLY SPARINGLY AND MASSAGE IN TWICE DAILY., Disp: , Rfl:    Past Surgical History:   Procedure Laterality Date    BREAST LUMPECTOMY  09/25/2013    Breast Surgery Lumpectomy    COLONOSCOPY  03/26/2015     Complete Colonoscopy    ESOPHAGOGASTRODUODENOSCOPY  03/16/2016    Diagnostic Esophagogastroduodenoscopy    KNEE ARTHROSCOPY W/ DEBRIDEMENT  09/25/2013    Knee Arthroscopy (Therapeutic)    MR HEAD ANGIO WO IV CONTRAST  8/9/2023    MR HEAD ANGIO WO IV CONTRAST 8/9/2023 [facility]    MR NECK ANGIO WO IV CONTRAST  8/9/2023    MR NECK ANGIO WO IV CONTRAST 8/9/2023 [facility]    OTHER SURGICAL HISTORY  09/25/2013    Breast Surgery Enlargement Procedure Bilateral    OTHER SURGICAL HISTORY  09/25/2013    Breast Surgery Removal Of Mammary Implant Bilateral    OTHER SURGICAL HISTORY  04/18/2022    Cataract surgery    OTHER SURGICAL HISTORY  04/18/2022    Turbinate reduction    OTHER SURGICAL HISTORY  04/18/2022    [place] tooth extraction    OTHER SURGICAL HISTORY  11/20/2019    Cyst excision    TOTAL ABDOMINAL HYSTERECTOMY  09/25/2013    Total Abdominal Hysterectomy    TOTAL KNEE ARTHROPLASTY Right 05/22/2023    Dr. Potter      Past Medical History:   Diagnosis Date    Acute bronchitis due to other specified organisms 01/15/2020    Acute bronchitis due to other specified organisms    Acute maxillary sinusitis, unspecified 10/10/2017    Acute non-recurrent maxillary sinusitis    Bitten or stung by nonvenomous insect and other nonvenomous arthropods, initial encounter 07/12/2016    Bedbug bite, initial encounter    Cervicalgia 02/28/2023    Encounter for screening for cardiovascular disorders 04/11/2016    Encounter for screening for cardiovascular disorders    Encounter for screening for diabetes mellitus 04/11/2016    Encounter for screening for diabetes mellitus    Encounter for screening for lipoid disorders 04/11/2016    Encounter for screening for lipoid disorders    Encounter for screening for osteoporosis 03/30/2021    Osteoporosis screening    Enteroviral vesicular pharyngitis 08/19/2015    Herpangina    Facial droop 09/07/2023    Gastroduodenitis 09/07/2023    Impacted cerumen, left ear 06/19/2018    Impacted cerumen of  left ear    Impacted cerumen, right ear 08/29/2018    Impacted cerumen of right ear    Localized enlarged lymph nodes 10/30/2019    Cervical lymphadenopathy    Localized enlarged lymph nodes 11/30/2016    Lymphadenopathy, inguinal    Localized swelling, mass and lump, head 09/23/2019    Lump of scalp    Oral mucositis (ulcerative), unspecified 09/07/2023    Osteoarthritis 02/28/2023    Otalgia, right ear 01/11/2016    Right ear pain    Other acute sinusitis 01/15/2020    Other acute sinusitis, recurrence not specified    Other fecal abnormalities 11/15/2018    Positive FIT (fecal immunochemical test)    Other forms of stomatitis 01/25/2017    Oral ulcer    Other malaise 11/20/2017    Malaise and fatigue    Other specified disorders of eustachian tube, bilateral 02/23/2018    Dysfunction of both eustachian tubes    Other specified disorders of eustachian tube, right ear 01/11/2016    Dysfunction of right eustachian tube    Personal history of diseases of the skin and subcutaneous tissue 01/06/2017    History of impetigo    Personal history of other diseases of the circulatory system     History of hypertension    Personal history of other diseases of the digestive system 11/18/2015    History of acute gastritis    Personal history of other diseases of the respiratory system 08/25/2014    Personal history of acute sinusitis    Personal history of other diseases of the respiratory system 02/23/2018    History of acute bronchitis    Personal history of other diseases of the respiratory system 10/31/2016    History of acute sinusitis    Personal history of other diseases of the respiratory system 12/16/2013    History of acute bronchitis    Personal history of other infectious and parasitic diseases 01/25/2017    History of candidiasis of mouth    Personal history of other specified conditions 06/19/2018    History of dizziness    Personal history of other specified conditions 12/28/2015    History of fatigue    Personal  "history of other specified conditions 11/12/2021    History of weight loss    Personal history of other specified conditions 01/02/2019    History of urinary frequency    Personal history of other specified conditions 10/12/2022    History of abnormal weight loss    Personal history of other specified conditions     History of painful urination    Personal history of other specified conditions 03/26/2015    History of shortness of breath    Radiculopathy, cervical region 10/10/2017    Cervical radiculopathy    Segmental and somatic dysfunction of cervical region 05/03/2017    Cervical somatic dysfunction    Spondylosis without myelopathy or radiculopathy, cervical region 09/19/2017    Cervical spondylosis    Sprain of ligaments of lumbar spine, initial encounter 02/23/2018    Lumbar sprain, initial encounter    Unspecified hearing loss, right ear 08/29/2018    Hearing loss, right    Urinary tract infection, site not specified 09/13/2021    Acute lower UTI     Social History     Tobacco Use    Smoking status: Never    Smokeless tobacco: Never   Substance Use Topics    Alcohol use: Never    Drug use: Never      Family History   Problem Relation Name Age of Onset    Allergies Mother      Heart failure Mother      Mental illness Mother        Review of Systems    Objective   BP (!) 148/98   Pulse 110   Ht 1.6 m (5' 3\")   Wt 49.9 kg (110 lb)   SpO2 98%   BMI 19.49 kg/m²    Physical Exam  Constitutional:       Appearance: Normal appearance.   HENT:      Head:      Comments: TTP over B/L Maxillary sinuses     Right Ear: Tympanic membrane normal.      Left Ear: Tympanic membrane normal.      Nose: Congestion present.      Mouth/Throat:      Mouth: Mucous membranes are moist.      Pharynx: Oropharynx is clear.   Eyes:      Extraocular Movements: Extraocular movements intact.      Conjunctiva/sclera: Conjunctivae normal.      Pupils: Pupils are equal, round, and reactive to light.   Cardiovascular:      Rate and Rhythm: " Normal rate and regular rhythm.      Pulses: Normal pulses.      Heart sounds: Normal heart sounds.   Pulmonary:      Effort: Pulmonary effort is normal.      Breath sounds: Normal breath sounds. No wheezing, rhonchi or rales.   Musculoskeletal:      Cervical back: Normal range of motion and neck supple.   Lymphadenopathy:      Cervical: Cervical adenopathy present.   Neurological:      Mental Status: She is alert.   Psychiatric:         Mood and Affect: Mood is anxious.         Behavior: Behavior normal.         Assessment/Plan   Problem List Items Addressed This Visit    None  Visit Diagnoses       Acute non-recurrent sinusitis of other sinus    -  Primary    Relevant Medications    azithromycin (Zithromax) 250 mg tablet    TIA (transient ischemic attack)        Relevant Medications    clopidogrel (Plavix) 75 mg tablet    b complex 0.4 mg tablet        Increase fluids, OTC cold meds  prn    Told parent/patient that if no improvement in 2-3 days then please call. If worsens or new symptoms occur then please call when this occurs. If worsening then go to ER immediately    TIA/Parkinson's- got appt. With neurology moved up to 11/14 due to episodes of rigidity, continue meds    Patient understands and agrees with treatment plan    Tenzin Waldrop, DO

## 2023-11-15 ENCOUNTER — OFFICE VISIT (OUTPATIENT)
Dept: NEUROLOGY | Facility: CLINIC | Age: 78
End: 2023-11-15
Payer: MEDICARE

## 2023-11-15 VITALS
SYSTOLIC BLOOD PRESSURE: 138 MMHG | HEART RATE: 85 BPM | HEIGHT: 64 IN | BODY MASS INDEX: 18.95 KG/M2 | DIASTOLIC BLOOD PRESSURE: 80 MMHG | WEIGHT: 111 LBS

## 2023-11-15 DIAGNOSIS — G20.A1 PARKINSON'S DISEASE WITHOUT DYSKINESIA, UNSPECIFIED WHETHER MANIFESTATIONS FLUCTUATE (MULTI): Primary | ICD-10-CM

## 2023-11-15 DIAGNOSIS — G45.9 TIA (TRANSIENT ISCHEMIC ATTACK): ICD-10-CM

## 2023-11-15 PROCEDURE — 3079F DIAST BP 80-89 MM HG: CPT

## 2023-11-15 PROCEDURE — 3075F SYST BP GE 130 - 139MM HG: CPT

## 2023-11-15 PROCEDURE — 99215 OFFICE O/P EST HI 40 MIN: CPT

## 2023-11-15 PROCEDURE — 1159F MED LIST DOCD IN RCRD: CPT

## 2023-11-15 PROCEDURE — 99417 PROLNG OP E/M EACH 15 MIN: CPT

## 2023-11-15 PROCEDURE — 1036F TOBACCO NON-USER: CPT

## 2023-11-15 PROCEDURE — 1160F RVW MEDS BY RX/DR IN RCRD: CPT

## 2023-11-15 RX ORDER — ROPINIROLE 0.25 MG/1
0.5 TABLET, FILM COATED ORAL 3 TIMES DAILY
Qty: 180 TABLET | Refills: 0 | Status: SHIPPED | OUTPATIENT
Start: 2023-11-15 | End: 2023-12-07 | Stop reason: SINTOL

## 2023-11-15 RX ORDER — UBIDECARENONE 75 MG
1 CAPSULE ORAL DAILY
COMMUNITY
Start: 2023-11-03 | End: 2024-05-03 | Stop reason: WASHOUT

## 2023-11-15 NOTE — PATIENT INSTRUCTIONS
We will get a datscan to evaluate for parkinson's disease vs. A mimic.   Start ropinirole 0.25 tablets 1 tab 3 times per day.   After a few days, if it is helping, and you are not having side effects, increase to 0.5 mg  (2 tablets three times per day )  Let me know in a few days how you are doing.     Continue plavix.      -Support groups may be helpful to patients and their families with Parkinson's disease. Organizations such as the Parkinson's Foundation, American Parkinson Disease Association, and National [place] [employer] Neurological Disorders and Stroke may also be helpful as they can provide reliable information and resources. The Parkinson's Disease Handbook is also available through the American Parkinson Disease Association, which can provide helpful information to patients and families. In the [place] area, particular events and programs geared toward Parkinson's Disease include [employer], [employer], and the annual Parkinson's Bootcamp.   -Studies have suggested that exercise can slow the progression of disease, and it also can help patients feel better physically, mentally, and allow for social interaction. It also can help with the stiffness and bent posture that some patients with Parkinson's experience. Exercise routines should include things like dance and/or chay chi which can help with balance and flexibility. Other aerobic exercises like walking, biking, or swimming can also be helpful. It may be helpful to work with a physical therapist.   -Safety for patients with Parkinson's disease is very important. We want to prevent falls as much as possible, and this risk increases as the disease progresses. Consider changes in the home to make the bathtub or shower safer. Avoid have loose rugs around the house and try to make sure the house is well lit especially at night. Again, physical therapy may be helpful for falls prevention. Speech therapy may also be helpful to help with slurred or quiet  "speech, and to help avoid choking and problems swallowing. [facility] can help located Parkinson-trained speech therapists near you.   -Finally, Mediterranean style diet may help decrease the risk of dementia and Alzheimer 's disease. Some patients struggle with constipation from the Parkinson's disease or the medications. Make sure you speak with your provider if you are experiencing this, but some steps including increasing hydration or using a stool softener may be helpful.     The above information is paraphrased from \"UpToDate Patient Education: Parkinson disease treatment options- education, support, and therapy (Beyond The Basics)\"     Tips for brain health:  1. Mediterranean diet: green leafy veggies, berries, fish, olive oil. No red meat, fried foods or processed sugars. Limit alcohol intake.  2. Brain exercises such as Lumosity, word puzzles, word games, card games.  3. Keep notepad to write things down. Make lists and follow them.  4.Engage in 20-30 minutes daily physical exercise: Walking, light weights, swimming, etc.  5. Get at least 7-9 hours sleep nightly.  6. Social engagement and having a community and support system will help keep your mind active and healthy.  7. If you feel depressed or down, reach out and get help.   "

## 2023-11-15 NOTE — ASSESSMENT & PLAN NOTE
Pt stopped her levodopa due to blurry vision and back pain.   Start ropinirole 0.25 mg one tablet TID, increase to 0.5 mg TID if tolerated  PT, continue using rollator   [employer]

## 2023-11-15 NOTE — PROGRESS NOTES
"Subjective     Kaitlin Robles is a  78 y.o.female  with PMH of osteoporosis, OA, HTN, HLD, GERD, IBS, PVD, CAD, anxiety, depression, migraines, vertigo, fibromyalgia,  urge incontinence, carotid artery stenosis who presents with   Chief Complaint   Patient presents with    Parkinson's Disease   . .    Visit type: follow up visit    HPI   Pt presented to the ED after a fall in August 2023. She was reported to have facial droop by EMS but this resolved by the time she got to the ED, along with her subjective L foot numbness. Dr. Ro saw the pt and felt like she had PD. He started sinemet 25/100 1 tablet TID. She did state that her writing had been worse over the last few months as well as her balance. States it feels like her left foot wouldn't lift up and when she writes it feels like her hand gets stuck and she has to pick it up and start again. On exam, noted bradykindesia, tremor, and rigidity, more dominant on left side. Also noted masked facies, hypophonia, gait and balance issues. Continued 25/100 TID.     For TIA, we continued plavix and recommended long term cardiac monitor. She cannot tolerate statin and aspirin. Dr Marquez did do a heart monitor earlier this year and per her there was no a fib.    She saw her PCP and noted first that she felt her walking was improving w sinemet but at a more recent visit was complaining of more rigidity.     Since then, she has been doing OK. Using a walker. Feels like her feet are getting stuck to the floor. Movement is slow and rigid. She stopped her sinemet because her vision was blurry and she had low back pain. She stopped it a couple weeks ago because of this. She did feel like it really helped her at first. She also complains of \"jerking' in her right leg which has been going on for about 13 years. States it is not painful. Always her right leg. Happens a couple nights a week for 5-10 minutes. Eating a pickle or drinking pickle juice stops it.     MOTOR " SYMPTOMS:  Balance difficulty Yes  Falls No  Exercise/PT No -- had some after d/c in august until beginning of october.  Help with ADLs Yes- needs some help getting socks on, showering. Lives alone.     NON MOTOR SYMPTOMS  Orthostatic lightheadedness No  Cognitive Yes sometimes needs a little extra time to think of someones name etc but always comes up with it, per friend  very sharp  Depression/apathy Yes mild, brother passed away in july   Anxiety No  Insomnia/RBD No  Fatigue Yes  Sialorrhea Yes  Hypophonia Yes  Dysphagia Yes - thicker textures like oatmeal,  uses straw for liquids  Constipation Yes  Urinary dysfunction Yes- some nocturnal incontinence         Meds:   Current: none  SE: Denies hallucinations, dyskinesia, impulse control, sudden sleep, edema  Previously tried: sinemet 25/100 1 tab TID but stopped it herself due to blurred vision and low back pain.      Review of Systems  10 point review of systems performed and is negative except as noted in the HPI.     All other systems have been reviewed and are negative for complaint.    Past Medical History:   Diagnosis Date    Acute bronchitis due to other specified organisms 01/15/2020    Acute bronchitis due to other specified organisms    Acute maxillary sinusitis, unspecified 10/10/2017    Acute non-recurrent maxillary sinusitis    Bitten or stung by nonvenomous insect and other nonvenomous arthropods, initial encounter 07/12/2016    Bedbug bite, initial encounter    Cervicalgia 02/28/2023    Encounter for screening for cardiovascular disorders 04/11/2016    Encounter for screening for cardiovascular disorders    Encounter for screening for diabetes mellitus 04/11/2016    Encounter for screening for diabetes mellitus    Encounter for screening for lipoid disorders 04/11/2016    Encounter for screening for lipoid disorders    Encounter for screening for osteoporosis 03/30/2021    Osteoporosis screening    Enteroviral vesicular pharyngitis 08/19/2015     Herpangina    Facial droop 09/07/2023    Gastroduodenitis 09/07/2023    Impacted cerumen, left ear 06/19/2018    Impacted cerumen of left ear    Impacted cerumen, right ear 08/29/2018    Impacted cerumen of right ear    Localized enlarged lymph nodes 10/30/2019    Cervical lymphadenopathy    Localized enlarged lymph nodes 11/30/2016    Lymphadenopathy, inguinal    Localized swelling, mass and lump, head 09/23/2019    Lump of scalp    Oral mucositis (ulcerative), unspecified 09/07/2023    Osteoarthritis 02/28/2023    Otalgia, right ear 01/11/2016    Right ear pain    Other acute sinusitis 01/15/2020    Other acute sinusitis, recurrence not specified    Other fecal abnormalities 11/15/2018    Positive FIT (fecal immunochemical test)    Other forms of stomatitis 01/25/2017    Oral ulcer    Other malaise 11/20/2017    Malaise and fatigue    Other specified disorders of eustachian tube, bilateral 02/23/2018    Dysfunction of both eustachian tubes    Other specified disorders of eustachian tube, right ear 01/11/2016    Dysfunction of right eustachian tube    Personal history of diseases of the skin and subcutaneous tissue 01/06/2017    History of impetigo    Personal history of other diseases of the circulatory system     History of hypertension    Personal history of other diseases of the digestive system 11/18/2015    History of acute gastritis    Personal history of other diseases of the respiratory system 08/25/2014    Personal history of acute sinusitis    Personal history of other diseases of the respiratory system 02/23/2018    History of acute bronchitis    Personal history of other diseases of the respiratory system 10/31/2016    History of acute sinusitis    Personal history of other diseases of the respiratory system 12/16/2013    History of acute bronchitis    Personal history of other infectious and parasitic diseases 01/25/2017    History of candidiasis of mouth    Personal history of other specified  conditions 06/19/2018    History of dizziness    Personal history of other specified conditions 12/28/2015    History of fatigue    Personal history of other specified conditions 11/12/2021    History of weight loss    Personal history of other specified conditions 01/02/2019    History of urinary frequency    Personal history of other specified conditions 10/12/2022    History of abnormal weight loss    Personal history of other specified conditions     History of painful urination    Personal history of other specified conditions 03/26/2015    History of shortness of breath    Radiculopathy, cervical region 10/10/2017    Cervical radiculopathy    Segmental and somatic dysfunction of cervical region 05/03/2017    Cervical somatic dysfunction    Spondylosis without myelopathy or radiculopathy, cervical region 09/19/2017    Cervical spondylosis    Sprain of ligaments of lumbar spine, initial encounter 02/23/2018    Lumbar sprain, initial encounter    Unspecified hearing loss, right ear 08/29/2018    Hearing loss, right    Urinary tract infection, site not specified 09/13/2021    Acute lower UTI       No relevant family history has been documented for this patient.    Past Surgical History:   Procedure Laterality Date    BREAST LUMPECTOMY  09/25/2013    Breast Surgery Lumpectomy    COLONOSCOPY  03/26/2015    Complete Colonoscopy    ESOPHAGOGASTRODUODENOSCOPY  03/16/2016    Diagnostic Esophagogastroduodenoscopy    KNEE ARTHROSCOPY W/ DEBRIDEMENT  09/25/2013    Knee Arthroscopy (Therapeutic)    MR HEAD ANGIO WO IV CONTRAST  8/9/2023    MR HEAD ANGIO WO IV CONTRAST 8/9/2023 [facility]    MR NECK ANGIO WO IV CONTRAST  8/9/2023    MR NECK ANGIO WO IV CONTRAST 8/9/2023 [facility]    OTHER SURGICAL HISTORY  09/25/2013    Breast Surgery Enlargement Procedure Bilateral    OTHER SURGICAL HISTORY  09/25/2013    Breast Surgery Removal Of Mammary Implant Bilateral    OTHER SURGICAL HISTORY  04/18/2022    Cataract surgery    OTHER  SURGICAL HISTORY  04/18/2022    Turbinate reduction    OTHER SURGICAL HISTORY  04/18/2022    Rueter tooth extraction    OTHER SURGICAL HISTORY  11/20/2019    Cyst excision    TOTAL ABDOMINAL HYSTERECTOMY  09/25/2013    Total Abdominal Hysterectomy    TOTAL KNEE ARTHROPLASTY Right 05/22/2023    Dr. Potter       Social History     Substance and Sexual Activity   Drug Use Never     Tobacco Use: Low Risk  (11/15/2023)    Patient History     Smoking Tobacco Use: Never     Smokeless Tobacco Use: Never     Passive Exposure: Not on file     Alcohol Use: Not on file           Objective     Neurological Exam     Alert and oriented to person, place, time, and situation.   Facial muscles are symmetric with significant masked facies.  Hypophonic, psychomotor slowing.   Ocular versions intact.   Speech fluent to history and without hypophonia.   Cogwheel rigidity in RUE.  Bradykindetic in BUE and BLE for finger tapping and toe tapping, slightly worse on the right.   No tremors or dyskinesia.  Stooped posture, slow unsteady gait, no significant shuffling, using walker.           Results for orders placed or performed in visit on 09/13/21   XR LUMBAR SPINE COMPLETE 4+ VIEWS    Narrative    MRN: [id number]  Patient Name: ANDREI LUDWIG     STUDY:  SPINE, LUMBOSACRAL  MIN 4 VIEWS; ;  9/13/2021 12:07 pm     INDICATION:  Low back pain.     COMPARISON:  February 5, 2016 radiographs     ACCESSION NUMBER(S):  [id number]     ORDERING CLINICIAN:  HALEY DAVILA     FINDINGS:  There is leftward curvature lumbar spine and grade 1 L3  anterolisthesis mildly progressed. Mild-to-moderate multilevel  spondylosis and degenerative disc changes are progressed, most  pronounced L4. No detected fracture, or suspicious osseous lesion. No  detected spondylolysis. Mild scattered arterial vascular  calcifications.     IMPRESSION:  Progressive lumbar malalignment, spondylosis and degenerative disc  changes.     Scattered arterial vascular calcifications.       Results for orders placed or performed in visit on 06/22/21   DEXA BONE DENSITY AXIAL SKELETON W VFA    Narrative    =================================================================            Bone Density and Vertebral Assessment Report             =================================================================     Height:           64.5 in  Weight:           124.0 lb  Fractures:  Treatments:     -----------------------------------------------------------------     Indication: postmenopausal osteoporosis; prior fracture;     hip or spine fracture;     Referring Provider: HALEY DAVILA     Study: Bone densitometry and vertebral deformity assessment         were performed.     Exam Date: June 22, 2021     Accession number: [id number]     Findings: Standard measurements were obtained utilizing a Dual   Energy X-ray Absorptiometry bone densitometer. Data obtained   includes planar bone density measurements over the Left Femur,   VFA, and Lumbar Spine. Comparison of measured data and   standardized mean data for a young adult population (when peak   bone mass occurs) results in a T score. This represents the   number of standard deviations above or below the mean of a   young adult population. Comparison of measured data to   standards from an age adjusted population similarly yields a Z   score.            Bone Density:  -----------------------------------------------------------------  Region                   BMD    T-score  Z-score   Classification  -----------------------------------------------------------------  AP Spine(L1-L4)          0.744   -2.8     -0.4       Osteoporosis  Femoral Neck (Left)      0.484   -3.3     -1.2       Osteoporosis  Total Hip (Left)         0.620   -2.6     -0.9       Osteoporosis  -----------------------------------------------------------------     World Health Organization criteria for BMD impression   classify patients as:   Normal (T-score at or above -1.0),   Osteopenia  (T-score between -1.0 and -2.5), or   Osteoporosis (T-score at or below -2.5).      10-year Fracture Risk:  -----------------------------------------------------------------  FRAX not reported because:    Some T-score for Spine Total or Hip Total or Femoral Neck at   or below -2.5     Prior hip or vertebral fracture  -----------------------------------------------------------------        Vertebral Deformity Assessment: Exam date 06/22/2021  -----------------------------------------------------------------  Vertebral Level                           Impression  -----------------------------------------------------------------  T4                                        Mild Biconcave   Deformity   T5                                        Normal  T6                                        Normal  T7                                        Mild Biconcave   Deformity   T8                                        Normal  T9                                        Normal  T10                                       Normal  T11                                       Normal  T12                                       Normal  L1                                        Normal  L2                                        Normal  L3                                        Normal  L4                                        Normal  -----------------------------------------------------------------  A spine fracture indicates 5X risk for subsequent spine fracture   and 2X risk for subsequent hip fracture.           Previous Exams:  -----------------------------------------------------------------  Region  Exam     Age  BMD   T-score  BMD Change     BMD Change           Date          g/cm2          vs Baseline    vs Previous  -----------------------------------------------------------------  AP Spine (L1-L4)       06/22/2021  76  0.744    -2.8      -2.8%          -2.8%            06/10/2019  74  0.765    -2.6                                     Total Hip(Left)       06/22/2021  76  0.620    -2.6      -2.5%          -2.5%            06/10/2019  74  0.636    -2.5                                 -----------------------------------------------------------------  *Denotes significance at 95% confidence level, LSC for AP Spine   = 0.022 g/cm2,  LSC for Total Hip = 0.027 g/cm2            Impression: The patient has osteoporosis as determined by WHO   criteria. Based on the results of the patient?s bone density   assessment, the risk of future fracture increases approximately   two fold for each 1.0 SD decrease in T-score.    However, low BMD is not the only risk factor for a future   fragility fracture. Other clinical risk factors for   osteoporotic fracture should be considered in ascertaining this   patient?s future fracture risk including the patient?s age,   previous osteoporotic (fragility) fracture, estrogen   deficiency/hypogonadal, risk of falling, use of medications   implicated in bone loss (glucocorticoids), family history of   osteoporotic fracture, diseases and conditions associated with   bone loss, low body weight, smoking, high bone turnover, etc.   Combining low BMD and other clinical risk factors result in a   more precise assessment of future fracture risk. Secondary   causes for osteoporosis, such as osteomalacia, other metabolic   bone disorders, and diseases and conditions that may contribute   to accelerated bone loss may have to be considered depending on   the clinical situation.    A repeat bone density assessment should be considered in two   years.      Patient has a Mild Biconcave Deformity fracture of vertebra T4.   Patient has a Mild Biconcave Deformity fracture of vertebra T7.         All images and detailed analysis are available on the    Radiology PACS.   Reported by: Paulie on 06/22/2021 9:16:00 AM.   Results for orders placed or performed in visit on 06/23/17   MR CERVICAL SPINE WO IV CONTRAST    Narrative    MRN:  [id number]  Patient Name: ANRDEI LUDWIG     STUDY:  NR MRI CERVICAL WO; 6/23/2017 7:24 am     INDICATION:  Signs/Symptoms: Left sided neck pain that radiates down into left arm.     COMPARISON:  Radiographs from June 16, 2017     ACCESSION NUMBER(S):  [id number]     ORDERING CLINICIAN:  HALYE DAVILA     TECHNIQUE:  Sagittal T1, T2, axial T1 and axial T2 weighted images were acquired  through the cervical spine.     FINDINGS:  Alignment: There is straightening of the normal cervical lordosis.     Vertebrae/Intervertebral Discs: The vertebral bodies demonstrate  expected height.Degenerative endplate signal changes are demonstrated  at C5/6 and C6/7. Disc space narrowing and endplate spurring are most  pronounced at C6/7 and C5/6.     Cord: No intrinsic signal abnormality is appreciated within the  cervical cord.     C1-C2: There are degenerative changes at the atlantodental interval.  There is no central canal stenosis.     C2-C3: There is no posterior disc contour abnormality. Degenerative  facet changes and uncovertebral joint hypertrophy are noted  bilaterally. There is mild left-sided neural foraminal narrowing.     C3-C4: There is trace disc bulging but no significant central canal  stenosis. Mild facet and uncovertebral joint hypertrophy are  demonstrated. The neural foramina are patent.     C4-C5: There is a tiny central disc protrusion but no significant  central canal stenosis. There is mild uncovertebral joint hypertrophy  on the left. Bilateral degenerative facet changes are also  demonstrated. There is mild left-sided neural foraminal narrowing.     C5-C6: There is a broad-based posterior disc/osteophyte complex  partially effacing the ventral subarachnoid space. There is mild  narrowing of the central canal. Degenerative facet changes and  uncovertebral joint hypertrophy contribute to moderate neural  foraminal stenosis.     C6-C7: There is a posterior disc/osteophyte complex partially  effacing the ventral  subarachnoid space. There is mild central canal  stenosis. Facet and uncovertebral joint hypertrophy contribute to  moderate left and mild right neural foraminal stenosis.     C7-T1: There is no posterior disc contour abnormality. There is no  significant central canal or neural foraminal stenosis.     A perineural root sleeve cyst is suspected on the left at T1/2.     The prevertebral and posterior paraspinous soft tissues are within  normal limits.     IMPRESSION:  Degenerative changes of the cervical spine primarily involving C5/6  and C6/7.   Results for orders placed or performed in visit on 06/16/17   XR CERVICAL SPINE COMPLETE 4-5 VIEWS    Narrative    MRN: [id number]  Patient Name: ANDREI LUDWIG     STUDY:  Cervical spine dated 6/16/2017.     INDICATION:  Left-sided neck pain that radiates down into left arm. No trauma.     COMPARISON:  None.     ACCESSION NUMBER(S):  [id number]     ORDERING CLINICIAN:  HALEY DAVILA     TECHNIQUE:  AP, lateral, bilateral oblique, odontoid, and Fuchs radiographs of  the cervical spine.     FINDINGS:  The cervical spine is seen to C7/T1. There is straightening of the  cervical lordosis. There is grade 1 anterolisthesis of C4 on C5.  vertebral body height and alignment are otherwise maintained. No  fracture or dislocation is evident.The dens and lateral masses are  intact.There is moderate C5-6 and C6-7 degenerative change with  bilateral neural foraminal narrowing. Mild degenerative changes seen  at other levels of the cervical spine.No prevertebral soft tissue  swelling is evident.Associated soft tissues are grossly unremarkable.     IMPRESSION:  1. Grade 1 anterolisthesis of C4 on C5. Straightening of the cervical  lordosis which could relate to patient positioning and/or muscle  spasm.  2. Degenerative changes discussed above.   Results for orders placed or performed in visit on 04/06/13   XR THORACIC SPINE 3 VIEWS    Narrative    EXAMINATION:      Thoracic spine     HISTORY:        PAIN     FINDINGS:      Two views of the thoracic spine are obtained. No comparison studies   available.     Alignment is intact. The vertebral body heights and the disc heights   are preserved. Apparent osteopenia. Very minimal endplate sclerosis   and osteophytes involving the upper thoracic spine.                 IMPRESSION:      Very minimal discogenic degenerative changes of the upper thoracic   spine and mild osteopenia. Otherwise essentially negative thoracic   spine.     This report was dictated at [facility].              Assessment/Plan   Problem List Items Addressed This Visit       Parkinson's disease - Primary    Overview     On exam noted to have bradykinesia, rigidity, tremor worse on the left side along with gait and balance disturbance and writing changes.   Started on CD-LD 25/100 TID in the hospital by Dr. Ro          Current Assessment & Plan     Pt stopped her levodopa due to blurry vision and back pain.   Start ropinirole 0.25 mg one tablet TID, increase to 0.5 mg TID if tolerated  PT, continue using rollator   [employer]         Relevant Medications    rOPINIRole (Requip) 0.25 mg tablet    Other Relevant Orders    NM DATSCAN ioflupane iodine 123    Referral to Physical Therapy    TIA (transient ischemic attack)    Overview     Presented to ED in august 2023 with left facial droop and left leg numbness. Sx resolved by the time of presentation.          Current Assessment & Plan     Continue plavix as she is unable to tolerate aspirin.   Cannot tolerate statin

## 2023-12-04 ENCOUNTER — APPOINTMENT (OUTPATIENT)
Dept: RADIOLOGY | Facility: HOSPITAL | Age: 78
End: 2023-12-04
Payer: MEDICARE

## 2023-12-07 ENCOUNTER — OFFICE VISIT (OUTPATIENT)
Dept: PRIMARY CARE | Facility: CLINIC | Age: 78
End: 2023-12-07
Payer: MEDICARE

## 2023-12-07 VITALS
BODY MASS INDEX: 19.05 KG/M2 | SYSTOLIC BLOOD PRESSURE: 150 MMHG | WEIGHT: 111 LBS | HEART RATE: 91 BPM | OXYGEN SATURATION: 96 % | DIASTOLIC BLOOD PRESSURE: 100 MMHG

## 2023-12-07 DIAGNOSIS — R05.3 CHRONIC COUGH: ICD-10-CM

## 2023-12-07 DIAGNOSIS — F33.1 MDD (MAJOR DEPRESSIVE DISORDER), RECURRENT EPISODE, MODERATE (MULTI): ICD-10-CM

## 2023-12-07 DIAGNOSIS — Z78.0 MENOPAUSE: ICD-10-CM

## 2023-12-07 DIAGNOSIS — I10 BENIGN ESSENTIAL HYPERTENSION: Primary | ICD-10-CM

## 2023-12-07 DIAGNOSIS — E78.2 MIXED HYPERLIPIDEMIA: ICD-10-CM

## 2023-12-07 PROCEDURE — 99214 OFFICE O/P EST MOD 30 MIN: CPT | Performed by: FAMILY MEDICINE

## 2023-12-07 PROCEDURE — 1159F MED LIST DOCD IN RCRD: CPT | Performed by: FAMILY MEDICINE

## 2023-12-07 PROCEDURE — 3077F SYST BP >= 140 MM HG: CPT | Performed by: FAMILY MEDICINE

## 2023-12-07 PROCEDURE — 1160F RVW MEDS BY RX/DR IN RCRD: CPT | Performed by: FAMILY MEDICINE

## 2023-12-07 PROCEDURE — 3080F DIAST BP >= 90 MM HG: CPT | Performed by: FAMILY MEDICINE

## 2023-12-07 PROCEDURE — 1036F TOBACCO NON-USER: CPT | Performed by: FAMILY MEDICINE

## 2023-12-07 PROCEDURE — 90677 PCV20 VACCINE IM: CPT | Performed by: FAMILY MEDICINE

## 2023-12-07 PROCEDURE — G0009 ADMIN PNEUMOCOCCAL VACCINE: HCPCS | Performed by: FAMILY MEDICINE

## 2023-12-07 RX ORDER — AMLODIPINE BESYLATE 2.5 MG/1
2.5 TABLET ORAL DAILY
Qty: 30 TABLET | Refills: 5 | Status: SHIPPED | OUTPATIENT
Start: 2023-12-07 | End: 2024-05-29

## 2023-12-07 RX ORDER — AZITHROMYCIN 250 MG/1
TABLET, FILM COATED ORAL
Qty: 6 TABLET | Refills: 0 | Status: SHIPPED | OUTPATIENT
Start: 2023-12-07 | End: 2023-12-12

## 2023-12-07 NOTE — PROGRESS NOTES
Subjective   Patient ID: Kaitlin Robles is a 78 y.o. female who presents for Follow-up.  HPI  DATSCAN was rescheduled  Left foot feels heavy off and on for a few months  Worsens after sitting awhile  Gets less once move awhile  Nothing there when first gets up in the morning  No numbness in foot  No swelling in the foot  Having some back pain- will going down into left leg  Usual  incontinence  No GI incontinence  No weight loss  No fever  Occasional chills    Right eye blurry  No eye pain  Right eye will tear  Has not seen eye doctor in a year  Having a lot of sodium in diet    No CP, SOB, palpitations, dizziness, HA    Ophtho-  Dentist-  Colonoscopy- 12/18  JOB-  FOBT-  UA/Micro-  Mammo- refused at this time  DEXA-   PAP- N/A  Lung CT-  Coronary Calcium CT Score-  AAA-  EKG-  Prevnar-  Flu- refused  Shingrix-  Td-  Hep C-  Advance Directives-      Current Outpatient Medications:     acetaminophen (TylenoL) 325 mg tablet, Take 1 tablet (325 mg) by mouth every 4 hours if needed., Disp: , Rfl:     ascorbic acid (Vitamin C) 1,000 mg tablet, Take 1 tablet (1,000 mg) by mouth once daily., Disp: 90 tablet, Rfl: 3    b complex 0.4 mg tablet, Take 1 tablet by mouth once daily., Disp: 90 tablet, Rfl: 3    Balanced B-100 Complex 100 mg tablet extended release, Take 1 tablet by mouth once daily., Disp: , Rfl:     cholecalciferol (Vitamin D3) 50 mcg (2,000 unit) capsule, Take 1 capsule (50 mcg) by mouth once daily., Disp: 90 capsule, Rfl: 3    clopidogrel (Plavix) 75 mg tablet, Take 1 tablet (75 mg) by mouth once daily., Disp: 90 tablet, Rfl: 3    diphenhydramine HCl (BENADRYL ORAL), Take 25 mg by mouth 4 times a day as needed (itching)., Disp: , Rfl:     fluticasone (Flonase) 50 mcg/actuation nasal spray, Administer 2 sprays into each nostril once daily., Disp: , Rfl:     loratadine (Claritin) 10 mg tablet, TAKE 1 TABLET EVERY MORNING AS NEEDED., Disp: , Rfl:     triamcinolone (Kenalog) 0.1 % cream, if needed. APPLY  SPARINGLY AND MASSAGE IN TWICE DAILY., Disp: , Rfl:     amLODIPine (Norvasc) 2.5 mg tablet, Take 1 tablet (2.5 mg) by mouth once daily., Disp: 30 tablet, Rfl: 5    azithromycin (Zithromax) 250 mg tablet, Take 2 tablets (500 mg) by mouth once daily for 1 day, THEN 1 tablet (250 mg) once daily for 4 days. Take 2 tabs (500 mg) by mouth today, than 1 daily for 4 days.., Disp: 6 tablet, Rfl: 0   Past Surgical History:   Procedure Laterality Date    BREAST LUMPECTOMY  09/25/2013    Breast Surgery Lumpectomy    COLONOSCOPY  03/26/2015    Complete Colonoscopy    ESOPHAGOGASTRODUODENOSCOPY  03/16/2016    Diagnostic Esophagogastroduodenoscopy    KNEE ARTHROSCOPY W/ DEBRIDEMENT  09/25/2013    Knee Arthroscopy (Therapeutic)    MR HEAD ANGIO WO IV CONTRAST  8/9/2023    MR HEAD ANGIO WO IV CONTRAST 8/9/2023 [facility]    MR NECK ANGIO WO IV CONTRAST  8/9/2023    MR NECK ANGIO WO IV CONTRAST 8/9/2023 [facility]    OTHER SURGICAL HISTORY  09/25/2013    Breast Surgery Enlargement Procedure Bilateral    OTHER SURGICAL HISTORY  09/25/2013    Breast Surgery Removal Of Mammary Implant Bilateral    OTHER SURGICAL HISTORY  04/18/2022    Cataract surgery    OTHER SURGICAL HISTORY  04/18/2022    Turbinate reduction    OTHER SURGICAL HISTORY  04/18/2022    [place] tooth extraction    OTHER SURGICAL HISTORY  11/20/2019    Cyst excision    TOTAL ABDOMINAL HYSTERECTOMY  09/25/2013    Total Abdominal Hysterectomy    TOTAL KNEE ARTHROPLASTY Right 05/22/2023    Dr. Potter      Past Medical History:   Diagnosis Date    Acute bronchitis due to other specified organisms 01/15/2020    Acute bronchitis due to other specified organisms    Acute maxillary sinusitis, unspecified 10/10/2017    Acute non-recurrent maxillary sinusitis    Bitten or stung by nonvenomous insect and other nonvenomous arthropods, initial encounter 07/12/2016    Bedbug bite, initial encounter    Cervicalgia 02/28/2023    Encounter for screening for cardiovascular disorders 04/11/2016     Encounter for screening for cardiovascular disorders    Encounter for screening for diabetes mellitus 04/11/2016    Encounter for screening for diabetes mellitus    Encounter for screening for lipoid disorders 04/11/2016    Encounter for screening for lipoid disorders    Encounter for screening for osteoporosis 03/30/2021    Osteoporosis screening    Enteroviral vesicular pharyngitis 08/19/2015    Herpangina    Facial droop 09/07/2023    Gastroduodenitis 09/07/2023    Impacted cerumen, left ear 06/19/2018    Impacted cerumen of left ear    Impacted cerumen, right ear 08/29/2018    Impacted cerumen of right ear    Localized enlarged lymph nodes 10/30/2019    Cervical lymphadenopathy    Localized enlarged lymph nodes 11/30/2016    Lymphadenopathy, inguinal    Localized swelling, mass and lump, head 09/23/2019    Lump of scalp    Oral mucositis (ulcerative), unspecified 09/07/2023    Osteoarthritis 02/28/2023    Otalgia, right ear 01/11/2016    Right ear pain    Other acute sinusitis 01/15/2020    Other acute sinusitis, recurrence not specified    Other fecal abnormalities 11/15/2018    Positive FIT (fecal immunochemical test)    Other forms of stomatitis 01/25/2017    Oral ulcer    Other malaise 11/20/2017    Malaise and fatigue    Other specified disorders of eustachian tube, bilateral 02/23/2018    Dysfunction of both eustachian tubes    Other specified disorders of eustachian tube, right ear 01/11/2016    Dysfunction of right eustachian tube    Personal history of diseases of the skin and subcutaneous tissue 01/06/2017    History of impetigo    Personal history of other diseases of the circulatory system     History of hypertension    Personal history of other diseases of the digestive system 11/18/2015    History of acute gastritis    Personal history of other diseases of the respiratory system 08/25/2014    Personal history of acute sinusitis    Personal history of other diseases of the respiratory system  02/23/2018    History of acute bronchitis    Personal history of other diseases of the respiratory system 10/31/2016    History of acute sinusitis    Personal history of other diseases of the respiratory system 12/16/2013    History of acute bronchitis    Personal history of other infectious and parasitic diseases 01/25/2017    History of candidiasis of mouth    Personal history of other specified conditions 06/19/2018    History of dizziness    Personal history of other specified conditions 12/28/2015    History of fatigue    Personal history of other specified conditions 11/12/2021    History of weight loss    Personal history of other specified conditions 01/02/2019    History of urinary frequency    Personal history of other specified conditions 10/12/2022    History of abnormal weight loss    Personal history of other specified conditions     History of painful urination    Personal history of other specified conditions 03/26/2015    History of shortness of breath    Radiculopathy, cervical region 10/10/2017    Cervical radiculopathy    Segmental and somatic dysfunction of cervical region 05/03/2017    Cervical somatic dysfunction    Spondylosis without myelopathy or radiculopathy, cervical region 09/19/2017    Cervical spondylosis    Sprain of ligaments of lumbar spine, initial encounter 02/23/2018    Lumbar sprain, initial encounter    Unspecified hearing loss, right ear 08/29/2018    Hearing loss, right    Urinary tract infection, site not specified 09/13/2021    Acute lower UTI     Social History     Tobacco Use    Smoking status: Never    Smokeless tobacco: Never   Substance Use Topics    Alcohol use: Never    Drug use: Never      Family History   Problem Relation Name Age of Onset    Allergies Mother      Heart failure Mother      Mental illness Mother        Review of Systems    Objective   BP (!) 150/100 (BP Location: Left arm, Patient Position: Sitting)   Pulse 91   Wt 50.3 kg (111 lb)   SpO2 96%    BMI 19.05 kg/m²    Physical Exam  Vitals and nursing note reviewed.   Constitutional:       Appearance: Normal appearance.   HENT:      Head: Normocephalic and atraumatic.      Right Ear: Tympanic membrane, ear canal and external ear normal. Decreased hearing noted.      Left Ear: Tympanic membrane, ear canal and external ear normal. Decreased hearing noted.      Nose:      Right Turbinates: Pale.      Left Turbinates: Pale.      Mouth/Throat:      Mouth: Mucous membranes are moist.      Pharynx: Oropharynx is clear.   Eyes:      Extraocular Movements: Extraocular movements intact.      Conjunctiva/sclera: Conjunctivae normal.      Pupils: Pupils are equal, round, and reactive to light.   Cardiovascular:      Rate and Rhythm: Normal rate and regular rhythm.      Pulses: Normal pulses.      Heart sounds: Normal heart sounds.   Pulmonary:      Effort: Pulmonary effort is normal.      Breath sounds: Normal breath sounds.   Abdominal:      General: Abdomen is flat. Bowel sounds are normal.      Palpations: Abdomen is soft.      Tenderness: There is no abdominal tenderness. There is no right CVA tenderness or left CVA tenderness.   Musculoskeletal:      Cervical back: Normal range of motion and neck supple.      Comments: TTP in left lower back with increased muscle tone   Lymphadenopathy:      Cervical: No cervical adenopathy.   Skin:     Capillary Refill: Capillary refill takes less than 2 seconds.   Neurological:      General: No focal deficit present.      Mental Status: She is alert and oriented to person, place, and time.      Gait: Gait abnormal.   Psychiatric:         Mood and Affect: Mood normal.         Behavior: Behavior normal.         Assessment/Plan   Problem List Items Addressed This Visit       Benign essential hypertension - Primary    Relevant Medications    amLODIPine (Norvasc) 2.5 mg tablet    Chronic cough    Relevant Medications    azithromycin (Zithromax) 250 mg tablet    Hyperlipidemia    MDD  (major depressive disorder), recurrent episode, moderate (CMS/HCC)     Other Visit Diagnoses       Menopause        Relevant Orders    XR DEXA bone density        HTN- restart amlodipine 2.5 mg, DASH diet    Chronic cough- z-pack prn, avoid allergens    MDD- refuses meds, stay active    Hyperlipidemia- limit fatty foods, increase activity level    Patient understands and agrees with treatment plan    Tenzin Waldrop, DO

## 2023-12-12 ENCOUNTER — HOSPITAL ENCOUNTER (OUTPATIENT)
Dept: RADIOLOGY | Facility: HOSPITAL | Age: 78
Discharge: HOME | End: 2023-12-12
Payer: MEDICARE

## 2023-12-12 ENCOUNTER — PREP FOR PROCEDURE (OUTPATIENT)
Dept: RADIOLOGY | Facility: HOSPITAL | Age: 78
End: 2023-12-12
Payer: MEDICARE

## 2023-12-12 DIAGNOSIS — G20.A1 PARKINSON'S DISEASE WITHOUT DYSKINESIA, UNSPECIFIED WHETHER MANIFESTATIONS FLUCTUATE (MULTI): ICD-10-CM

## 2023-12-12 RX ORDER — POTASSIUM IODIDE 1 G/ML
300 SOLUTION ORAL ONCE
OUTPATIENT
Start: 2023-12-12

## 2023-12-26 ENCOUNTER — HOSPITAL ENCOUNTER (OUTPATIENT)
Dept: RADIOLOGY | Facility: HOSPITAL | Age: 78
Discharge: HOME | End: 2023-12-26
Payer: MEDICARE

## 2023-12-26 PROCEDURE — 78803 RP LOCLZJ TUM SPECT 1 AREA: CPT | Performed by: RADIOLOGY

## 2023-12-26 PROCEDURE — 78803 RP LOCLZJ TUM SPECT 1 AREA: CPT

## 2023-12-26 PROCEDURE — A9584 IODINE I-123 IOFLUPANE: HCPCS | Performed by: RADIOLOGY

## 2023-12-26 PROCEDURE — 3430000001 HC RX 343 DIAGNOSTIC RADIOPHARMACEUTICALS: Performed by: RADIOLOGY

## 2023-12-26 RX ORDER — IOFLUPANE I-123 2 MCI/ML
4.56 INJECTION, SOLUTION INTRAVENOUS
Status: COMPLETED | OUTPATIENT
Start: 2023-12-26 | End: 2023-12-26

## 2023-12-26 RX ADMIN — IOFLUPANE I-123 4.56 MILLICURIE: 2 INJECTION, SOLUTION INTRAVENOUS at 09:50

## 2023-12-27 DIAGNOSIS — G20.A1 PARKINSON'S DISEASE, UNSPECIFIED WHETHER DYSKINESIA PRESENT, UNSPECIFIED WHETHER MANIFESTATIONS FLUCTUATE (MULTI): Primary | ICD-10-CM

## 2023-12-27 RX ORDER — CARBIDOPA AND LEVODOPA 25; 100 MG/1; MG/1
1 TABLET ORAL 3 TIMES DAILY
Qty: 90 TABLET | Refills: 2 | Status: SHIPPED | OUTPATIENT
Start: 2023-12-27 | End: 2024-04-04

## 2023-12-27 NOTE — PROGRESS NOTES
Pt called stating ropinirole is making her itchy. Instructed her to stop it.   She states she would like to try sinemet again since the blurry vision she was worried about did not go away and she is going to see the eye doctor.   Sent CDLD  1 tablet three times daily to her pharmacy. Has f/u scheduled in a few weeks.

## 2024-01-04 DIAGNOSIS — J30.1 NON-SEASONAL ALLERGIC RHINITIS DUE TO POLLEN: Primary | ICD-10-CM

## 2024-01-04 RX ORDER — LORATADINE 10 MG/1
TABLET ORAL
Qty: 30 TABLET | Refills: 11 | Status: SHIPPED | OUTPATIENT
Start: 2024-01-04

## 2024-01-11 ENCOUNTER — OFFICE VISIT (OUTPATIENT)
Dept: PRIMARY CARE | Facility: CLINIC | Age: 79
End: 2024-01-11
Payer: MEDICARE

## 2024-01-11 VITALS
OXYGEN SATURATION: 98 % | SYSTOLIC BLOOD PRESSURE: 136 MMHG | DIASTOLIC BLOOD PRESSURE: 74 MMHG | WEIGHT: 115 LBS | HEART RATE: 85 BPM | BODY MASS INDEX: 19.74 KG/M2

## 2024-01-11 DIAGNOSIS — Z13.89 ENCOUNTER FOR SCREENING FOR OTHER DISORDER: ICD-10-CM

## 2024-01-11 DIAGNOSIS — I10 BENIGN ESSENTIAL HYPERTENSION: ICD-10-CM

## 2024-01-11 DIAGNOSIS — F33.1 MDD (MAJOR DEPRESSIVE DISORDER), RECURRENT EPISODE, MODERATE (MULTI): ICD-10-CM

## 2024-01-11 DIAGNOSIS — Z00.00 ROUTINE GENERAL MEDICAL EXAMINATION AT HEALTH CARE FACILITY: Primary | ICD-10-CM

## 2024-01-11 DIAGNOSIS — K21.9 GASTROESOPHAGEAL REFLUX DISEASE WITHOUT ESOPHAGITIS: ICD-10-CM

## 2024-01-11 DIAGNOSIS — I73.9 PERIPHERAL VASCULAR DISEASE, UNSPECIFIED (CMS-HCC): ICD-10-CM

## 2024-01-11 DIAGNOSIS — E46 PROTEIN-CALORIE MALNUTRITION, UNSPECIFIED SEVERITY (MULTI): ICD-10-CM

## 2024-01-11 DIAGNOSIS — G20.A2 PARKINSON'S DISEASE WITHOUT DYSKINESIA, WITH FLUCTUATING MANIFESTATIONS (MULTI): ICD-10-CM

## 2024-01-11 DIAGNOSIS — M79.7 FIBROMYALGIA: ICD-10-CM

## 2024-01-11 DIAGNOSIS — I48.0 PAROXYSMAL ATRIAL FIBRILLATION (MULTI): ICD-10-CM

## 2024-01-11 DIAGNOSIS — K58.9 IRRITABLE BOWEL SYNDROME, UNSPECIFIED TYPE: ICD-10-CM

## 2024-01-11 DIAGNOSIS — I73.9 PERIPHERAL VASCULAR DISEASE (CMS-HCC): ICD-10-CM

## 2024-01-11 DIAGNOSIS — E78.2 MIXED HYPERLIPIDEMIA: ICD-10-CM

## 2024-01-11 PROBLEM — S22.000A CLOSED COMPRESSION FRACTURE OF THORACIC VERTEBRA (MULTI): Status: RESOLVED | Noted: 2023-02-28 | Resolved: 2024-01-11

## 2024-01-11 LAB
ALBUMIN SERPL BCP-MCNC: 4.5 G/DL (ref 3.4–5)
ALP SERPL-CCNC: 62 U/L (ref 33–136)
ALT SERPL W P-5'-P-CCNC: 6 U/L (ref 7–45)
ANION GAP SERPL CALC-SCNC: 13 MMOL/L (ref 10–20)
AST SERPL W P-5'-P-CCNC: 17 U/L (ref 9–39)
BILIRUB SERPL-MCNC: 0.4 MG/DL (ref 0–1.2)
BUN SERPL-MCNC: 22 MG/DL (ref 6–23)
CALCIUM SERPL-MCNC: 9.8 MG/DL (ref 8.6–10.3)
CHLORIDE SERPL-SCNC: 106 MMOL/L (ref 98–107)
CHOLEST SERPL-MCNC: 261 MG/DL (ref 0–199)
CHOLESTEROL/HDL RATIO: 2.5
CO2 SERPL-SCNC: 26 MMOL/L (ref 21–32)
CREAT SERPL-MCNC: 0.8 MG/DL (ref 0.5–1.05)
EGFRCR SERPLBLD CKD-EPI 2021: 76 ML/MIN/1.73M*2
ERYTHROCYTE [DISTWIDTH] IN BLOOD BY AUTOMATED COUNT: 12.6 % (ref 11.5–14.5)
GLUCOSE SERPL-MCNC: 96 MG/DL (ref 74–99)
HCT VFR BLD AUTO: 39.7 % (ref 36–46)
HDLC SERPL-MCNC: 103.7 MG/DL
HGB BLD-MCNC: 12.9 G/DL (ref 12–16)
LDLC SERPL CALC-MCNC: 141 MG/DL
MCH RBC QN AUTO: 30.5 PG (ref 26–34)
MCHC RBC AUTO-ENTMCNC: 32.5 G/DL (ref 32–36)
MCV RBC AUTO: 94 FL (ref 80–100)
NON HDL CHOLESTEROL: 157 MG/DL (ref 0–149)
NRBC BLD-RTO: 0 /100 WBCS (ref 0–0)
PLATELET # BLD AUTO: 277 X10*3/UL (ref 150–450)
POTASSIUM SERPL-SCNC: 4 MMOL/L (ref 3.5–5.3)
PROT SERPL-MCNC: 6.9 G/DL (ref 6.4–8.2)
RBC # BLD AUTO: 4.23 X10*6/UL (ref 4–5.2)
SODIUM SERPL-SCNC: 141 MMOL/L (ref 136–145)
TRIGL SERPL-MCNC: 81 MG/DL (ref 0–149)
VLDL: 16 MG/DL (ref 0–40)
WBC # BLD AUTO: 5.7 X10*3/UL (ref 4.4–11.3)

## 2024-01-11 PROCEDURE — 1123F ACP DISCUSS/DSCN MKR DOCD: CPT | Performed by: FAMILY MEDICINE

## 2024-01-11 PROCEDURE — 99214 OFFICE O/P EST MOD 30 MIN: CPT | Performed by: FAMILY MEDICINE

## 2024-01-11 PROCEDURE — 80061 LIPID PANEL: CPT

## 2024-01-11 PROCEDURE — 80053 COMPREHEN METABOLIC PANEL: CPT

## 2024-01-11 PROCEDURE — 1158F ADVNC CARE PLAN TLK DOCD: CPT | Performed by: FAMILY MEDICINE

## 2024-01-11 PROCEDURE — G0442 ANNUAL ALCOHOL SCREEN 15 MIN: HCPCS | Performed by: FAMILY MEDICINE

## 2024-01-11 PROCEDURE — 99397 PER PM REEVAL EST PAT 65+ YR: CPT | Performed by: FAMILY MEDICINE

## 2024-01-11 PROCEDURE — 85027 COMPLETE CBC AUTOMATED: CPT

## 2024-01-11 PROCEDURE — G0439 PPPS, SUBSEQ VISIT: HCPCS | Performed by: FAMILY MEDICINE

## 2024-01-11 PROCEDURE — 1170F FXNL STATUS ASSESSED: CPT | Performed by: FAMILY MEDICINE

## 2024-01-11 PROCEDURE — 36415 COLL VENOUS BLD VENIPUNCTURE: CPT

## 2024-01-11 PROCEDURE — 3075F SYST BP GE 130 - 139MM HG: CPT | Performed by: FAMILY MEDICINE

## 2024-01-11 PROCEDURE — 3078F DIAST BP <80 MM HG: CPT | Performed by: FAMILY MEDICINE

## 2024-01-11 PROCEDURE — 1159F MED LIST DOCD IN RCRD: CPT | Performed by: FAMILY MEDICINE

## 2024-01-11 PROCEDURE — 1160F RVW MEDS BY RX/DR IN RCRD: CPT | Performed by: FAMILY MEDICINE

## 2024-01-11 PROCEDURE — 1036F TOBACCO NON-USER: CPT | Performed by: FAMILY MEDICINE

## 2024-01-11 ASSESSMENT — ENCOUNTER SYMPTOMS
TROUBLE SWALLOWING: 0
NUMBNESS: 1
FREQUENCY: 0
WHEEZING: 0
FEVER: 0
COUGH: 0
DIARRHEA: 0
NERVOUS/ANXIOUS: 0
VOMITING: 0
FATIGUE: 0
ABDOMINAL PAIN: 0
POLYPHAGIA: 0
ADENOPATHY: 0
CHEST TIGHTNESS: 0
DIZZINESS: 0
PALPITATIONS: 0
BRUISES/BLEEDS EASILY: 0
BACK PAIN: 0
NAUSEA: 0
HEADACHES: 0
SHORTNESS OF BREATH: 1
EYE REDNESS: 0
CONSTIPATION: 0
CHILLS: 0
DYSURIA: 0
ARTHRALGIAS: 1
HEMATURIA: 0
POLYDIPSIA: 0
DYSPHORIC MOOD: 1
BLOOD IN STOOL: 0
SORE THROAT: 0
COLOR CHANGE: 0
WEAKNESS: 0
EYE PAIN: 0
TREMORS: 0

## 2024-01-11 ASSESSMENT — ACTIVITIES OF DAILY LIVING (ADL)
DRESSING: INDEPENDENT
GROCERY_SHOPPING: INDEPENDENT
TAKING_MEDICATION: INDEPENDENT
TAKING_MEDICATION: INDEPENDENT
DOING_HOUSEWORK: INDEPENDENT
DOING_HOUSEWORK: INDEPENDENT
MANAGING_FINANCES: INDEPENDENT
BATHING: INDEPENDENT
BATHING: INDEPENDENT
DRESSING: INDEPENDENT
GROCERY_SHOPPING: NEEDS ASSISTANCE
MANAGING_FINANCES: INDEPENDENT

## 2024-01-11 ASSESSMENT — LIFESTYLE VARIABLES
HOW OFTEN DO YOU HAVE A DRINK CONTAINING ALCOHOL: NEVER
HOW OFTEN DO YOU HAVE SIX OR MORE DRINKS ON ONE OCCASION: NEVER
AUDIT-C TOTAL SCORE: 0
SKIP TO QUESTIONS 9-10: 1
HOW MANY STANDARD DRINKS CONTAINING ALCOHOL DO YOU HAVE ON A TYPICAL DAY: PATIENT DOES NOT DRINK
AUDIT-C TOTAL SCORE: 0

## 2024-01-11 ASSESSMENT — PATIENT HEALTH QUESTIONNAIRE - PHQ9
SUM OF ALL RESPONSES TO PHQ9 QUESTIONS 1 AND 2: 0
1. LITTLE INTEREST OR PLEASURE IN DOING THINGS: NOT AT ALL
SUM OF ALL RESPONSES TO PHQ9 QUESTIONS 1 AND 2: 0
1. LITTLE INTEREST OR PLEASURE IN DOING THINGS: NOT AT ALL
2. FEELING DOWN, DEPRESSED OR HOPELESS: NOT AT ALL
2. FEELING DOWN, DEPRESSED OR HOPELESS: NOT AT ALL

## 2024-01-11 NOTE — PROGRESS NOTES
Subjective   Reason for Visit: Kaitlin Robles is an 78 y.o. female here for a Medicare Wellness visit.     Past Medical, Surgical, and Family History reviewed and updated in chart.    Reviewed all medications by prescribing practitioner or clinical pharmacist (such as prescriptions, OTCs, herbal therapies and supplements) and documented in the medical record.    HPI  No SE meds  Sleep sporadic right now  Grieving father dying 1/9/24  Appetite better- gaining weight  No real CP- some prickling around chest when takes the amlodipine  No SOB, palpitations,  Fingers will feel a little numbness in hands after takes amlodipine but goes away  No weakness  No falls  Balance is doing well  Vision okay  No HA, dizziness    Ophtho- 1/24  Dentist-been awhile  Colonoscopy- N/A  JOB-  FOBT-  UA/Micro-  Mammo- N/A  DEXA-  PAP- N/A  Lung CT-  Coronary Calcium CT Score-  AAA-  EKG-  RSV  Prevnar- 12/23  Flu-  Shingrix-  Td- 8/9/23  Hep C-  Advance Directives- has them  ETOH screen- 1/11/24        Patient Care Team:  Tenzin Waldrop DO as PCP - General  Tenzin Waldrop DO as PCP - [employer] PCP  Willie Potter DO as Consulting Physician (Orthopaedic Surgery)  Jazmine Alcala PA-C as Physician Assistant (Neurology)  Sandip Ramírez MD as Surgeon (Ophthalmology)     Review of Systems   Constitutional:  Negative for chills, fatigue and fever.   HENT:  Negative for congestion, ear discharge, ear pain, hearing loss, nosebleeds, sore throat, tinnitus and trouble swallowing.    Eyes:  Negative for pain, redness and visual disturbance.   Respiratory:  Positive for shortness of breath. Negative for cough, chest tightness and wheezing.    Cardiovascular:  Negative for chest pain, palpitations and leg swelling.   Gastrointestinal:  Negative for abdominal pain, blood in stool, constipation, diarrhea, nausea and vomiting.   Endocrine: Negative for cold intolerance, heat intolerance, polydipsia, polyphagia and polyuria.    Genitourinary:  Negative for dysuria, frequency, hematuria and urgency.   Musculoskeletal:  Positive for arthralgias. Negative for back pain and gait problem.   Skin:  Negative for color change and rash.   Neurological:  Positive for numbness. Negative for dizziness, tremors, syncope, weakness and headaches.   Hematological:  Negative for adenopathy. Does not bruise/bleed easily.   Psychiatric/Behavioral:  Positive for dysphoric mood. The patient is not nervous/anxious.        Objective   Vitals:  /74   Pulse 85   Wt 52.2 kg (115 lb)   SpO2 98%   BMI 19.74 kg/m²       Physical Exam  Vitals and nursing note reviewed.   Constitutional:       Appearance: Normal appearance.   HENT:      Head: Normocephalic and atraumatic.      Right Ear: Tympanic membrane, ear canal and external ear normal. Decreased hearing noted.      Left Ear: Tympanic membrane, ear canal and external ear normal. Decreased hearing noted.      Nose: Nose normal.      Mouth/Throat:      Mouth: Mucous membranes are moist.      Pharynx: Oropharynx is clear.   Eyes:      Extraocular Movements: Extraocular movements intact.      Conjunctiva/sclera: Conjunctivae normal.      Pupils: Pupils are equal, round, and reactive to light.   Cardiovascular:      Rate and Rhythm: Normal rate and regular rhythm.      Pulses: Normal pulses.      Heart sounds: Normal heart sounds.   Pulmonary:      Effort: Pulmonary effort is normal.      Breath sounds: Normal breath sounds.   Abdominal:      General: Abdomen is flat. Bowel sounds are normal.      Palpations: Abdomen is soft.      Tenderness: There is no abdominal tenderness. There is no right CVA tenderness or left CVA tenderness.   Musculoskeletal:      Cervical back: Normal range of motion and neck supple.      Comments: TTP in left lower back with increased muscle tone   Lymphadenopathy:      Cervical: No cervical adenopathy.   Skin:     Capillary Refill: Capillary refill takes less than 2 seconds.    Neurological:      General: No focal deficit present.      Mental Status: She is alert and oriented to person, place, and time.      Gait: Gait abnormal.   Psychiatric:         Mood and Affect: Mood normal.         Behavior: Behavior normal.         Assessment/Plan   Problem List Items Addressed This Visit       Benign essential hypertension    Overview     Patient refuses lisinopril. Start amolodipine 5 mg daily patient to followup with  Dr. Waldrop and 2-3 weeks. Discussed side effects         Relevant Orders    CBC (Completed)    Comprehensive Metabolic Panel (Completed)    Fibromyalgia    GERD (gastroesophageal reflux disease)    Hyperlipidemia    Relevant Orders    CBC (Completed)    Comprehensive Metabolic Panel (Completed)    Lipid Panel (Completed)    Irritable bowel syndrome    MDD (major depressive disorder), recurrent episode, moderate (CMS/HCC)    Parkinson's disease without dyskinesia, with fluctuating manifestations    Overview     On exam noted to have bradykinesia, rigidity, tremor worse on the left side along with gait and balance disturbance and writing changes.   Started on CD-LD 25/100 TID in the hospital by Dr. Ro          Paroxysmal atrial fibrillation (CMS/HCC)    Peripheral vascular disease (CMS/HCC)    Protein-calorie malnutrition, unspecified severity (CMS/HCC)     Other Visit Diagnoses       Routine general medical examination at health care facility    -  Primary    Peripheral vascular disease, unspecified (CMS/HCC)        Encounter for screening for other disorder            Renewed/continued rest of medications  Checked  labs  Updated Health Maintenance in HPI section  HTN, controlled- continue meds, low sodium diet    Malnutrition- increase number of small meals with nutrient rich foods    Hyperlipidemia- continue meds, low fat/cholesterol diet    MDD- does not feel needs meds, talk with others    Parkinson's- Sinemet, follow with neurology    A. Fib- limit caffeine,  plavix    PVD- plavix, activity    FM- heat, activity safely    GERD- avoid triggers, no meds needed at this time    IBS-  fiber, FODMAP diet    F/U 6 months

## 2024-01-17 ENCOUNTER — OFFICE VISIT (OUTPATIENT)
Dept: NEUROLOGY | Facility: CLINIC | Age: 79
End: 2024-01-17
Payer: MEDICARE

## 2024-01-17 VITALS
WEIGHT: 115 LBS | DIASTOLIC BLOOD PRESSURE: 80 MMHG | HEART RATE: 95 BPM | SYSTOLIC BLOOD PRESSURE: 144 MMHG | BODY MASS INDEX: 19.63 KG/M2 | HEIGHT: 64 IN

## 2024-01-17 DIAGNOSIS — G20.A2 PARKINSON'S DISEASE WITHOUT DYSKINESIA, WITH FLUCTUATING MANIFESTATIONS (MULTI): Primary | ICD-10-CM

## 2024-01-17 PROCEDURE — 3077F SYST BP >= 140 MM HG: CPT

## 2024-01-17 PROCEDURE — 3079F DIAST BP 80-89 MM HG: CPT

## 2024-01-17 PROCEDURE — 1160F RVW MEDS BY RX/DR IN RCRD: CPT

## 2024-01-17 PROCEDURE — 99214 OFFICE O/P EST MOD 30 MIN: CPT

## 2024-01-17 PROCEDURE — 1159F MED LIST DOCD IN RCRD: CPT

## 2024-01-17 PROCEDURE — 1036F TOBACCO NON-USER: CPT

## 2024-01-17 NOTE — ASSESSMENT & PLAN NOTE
Continue sinemet 25/100 three times daily   I will talk to Dr. Waldrop about sleep schedule   Try magnesium at night

## 2024-01-17 NOTE — PATIENT INSTRUCTIONS
Continue sinemet 25/100 three times daily   I will talk to Dr. Waldrop about your sleep schedule   Try magnesium at night     -Support groups may be helpful to patients and their families with Parkinson's disease. Organizations such as the Parkinson's Foundation, American Parkinson Disease Association, and National [employer] of Neurological Disorders and Stroke may also be helpful as they can provide reliable information and resources. The Parkinson's Disease Handbook is also available through the American Parkinson Disease Association, which can provide helpful information to patients and families. In the [place] area, particular events and programs geared toward Parkinson's Disease include [employer], [employer], and the annual Parkinson's Bootcamp.   -Studies have suggested that exercise can slow the progression of disease, and it also can help patients feel better physically, mentally, and allow for social interaction. It also can help with the stiffness and bent posture that some patients with Parkinson's experience. Exercise routines should include things like dance and/or chay chi which can help with balance and flexibility. Other aerobic exercises like walking, biking, or swimming can also be helpful. It may be helpful to work with a physical therapist.   -Safety for patients with Parkinson's disease is very important. We want to prevent falls as much as possible, and this risk increases as the disease progresses. Consider changes in the home to make the bathtub or shower safer. Avoid have loose rugs around the house and try to make sure the house is well lit especially at night. Again, physical therapy may be helpful for falls prevention. Speech therapy may also be helpful to help with slurred or quiet speech, and to help avoid choking and problems swallowing. [facility] can help located Parkinson-trained speech therapists near you.   -Finally, Mediterranean style diet may help  "decrease the risk of dementia and Alzheimer 's disease. Some patients struggle with constipation from the Parkinson's disease or the medications. Make sure you speak with your provider if you are experiencing this, but some steps including increasing hydration or using a stool softener may be helpful.     The above information is paraphrased from \"UpToDate Patient Education: Parkinson disease treatment options- education, support, and therapy (Beyond The Basics)\"   "

## 2024-01-17 NOTE — PROGRESS NOTES
"Subjective     Kaitlin Robles is a  78 y.o.female  with PMH of osteoporosis, OA, HTN, HLD, GERD, IBS, PVD, CAD, anxiety, depression, migraines, vertigo, fibromyalgia,  urge incontinence, carotid artery stenosis who presents with   Chief Complaint   Patient presents with    Parkinson's Disease   . .    Visit type: follow up visit    HPI   Last seen by me in Nov 2023.   At that time, she had been doing OK and using a walker. Some freezing. Slow and rigid movements. Stopped sinemet because of blurry vision and low back pain but felt like it really helped her at first.  We started ropinirole 0.25 mg one tab TID and instructed to increase to 0.5 mg TID if tolerated. Also ordered [employer]. She called in Dec and said the ropinirole was making her itchy and she wanted to try sinemet again as the blurry vision did not improve anyway and was going to see the eye doctor. Sent CDLD  1 tab TID.     1/17/23:  Since then, patient has been doing ok. She has been on CD-LD 1 tablet three times per day.  Her sleep schedule is very disrupted. Sleeping from 3 pm or so until 930 pm then goes back to bed around 11 and wakes up at 2 am and stays up for the day. Family and friends call late.     MOTOR SYMPTOMS:  Balance difficulty Yes- using rollator at home sometimes. R knee keeps \"giving out\"  Falls No  Exercise/PT No -- had some after d/c in august until beginning of october.  Help with ADLs Yes- needs some help getting socks on, showering. Some difficulty getting dressed but can do it. Lives alone. Issues with cooking/standing long periods of time because of her back.    NON MOTOR SYMPTOMS  Orthostatic lightheadedness No  Cognitive Yes sometimes needs a little extra time to think of someones name etc but always comes up with it, per friend  very sharp  Depression/apathy Yes mild, brother passed away in july , father passed away last week   Anxiety No  Insomnia/RBD No  Fatigue Yes - needs a nap most afternoons. Sleeps 5-6 hours, " wakes up at 930pm, has coffee, goes back to bed   Sialorrhea Yes  Hypophonia Yes  Dysphagia Yes - thicker textures like oatmeal,  uses straw for liquids  Constipation Yes  Urinary dysfunction Yes- some nocturnal incontinence       Meds:   Current: sinemet 25/100 1 tab TID -- 7, 1, 5  Time to turn on: sometimes can tell, over an hour   Wearing off: cannot tell   SE: Denies hallucinations, dyskinesia, impulse control, sudden sleep, edema. Has some sleepiness late afternoon. Does not sleep very well all night long and then taking very long naps.   Previously tried: ropinirole caused itchiness       Review of Systems  10 point review of systems performed and is negative except as noted in the HPI.     All other systems have been reviewed and are negative for complaint.    Past Medical History:   Diagnosis Date    Acute bronchitis due to other specified organisms 01/15/2020    Acute bronchitis due to other specified organisms    Acute maxillary sinusitis, unspecified 10/10/2017    Acute non-recurrent maxillary sinusitis    Bitten or stung by nonvenomous insect and other nonvenomous arthropods, initial encounter 07/12/2016    Bedbug bite, initial encounter    Cervicalgia 02/28/2023    Closed compression fracture of thoracic vertebra (CMS/HCC) 02/28/2023    Encounter for screening for cardiovascular disorders 04/11/2016    Encounter for screening for cardiovascular disorders    Encounter for screening for diabetes mellitus 04/11/2016    Encounter for screening for diabetes mellitus    Encounter for screening for lipoid disorders 04/11/2016    Encounter for screening for lipoid disorders    Encounter for screening for osteoporosis 03/30/2021    Osteoporosis screening    Enteroviral vesicular pharyngitis 08/19/2015    Herpangina    Facial droop 09/07/2023    Gastroduodenitis 09/07/2023    Impacted cerumen, left ear 06/19/2018    Impacted cerumen of left ear    Impacted cerumen, right ear 08/29/2018    Impacted cerumen of  right ear    Localized enlarged lymph nodes 10/30/2019    Cervical lymphadenopathy    Localized enlarged lymph nodes 11/30/2016    Lymphadenopathy, inguinal    Localized swelling, mass and lump, head 09/23/2019    Lump of scalp    Oral mucositis (ulcerative), unspecified 09/07/2023    Osteoarthritis 02/28/2023    Otalgia, right ear 01/11/2016    Right ear pain    Other acute sinusitis 01/15/2020    Other acute sinusitis, recurrence not specified    Other fecal abnormalities 11/15/2018    Positive FIT (fecal immunochemical test)    Other forms of stomatitis 01/25/2017    Oral ulcer    Other malaise 11/20/2017    Malaise and fatigue    Other specified disorders of eustachian tube, bilateral 02/23/2018    Dysfunction of both eustachian tubes    Other specified disorders of eustachian tube, right ear 01/11/2016    Dysfunction of right eustachian tube    Personal history of diseases of the skin and subcutaneous tissue 01/06/2017    History of impetigo    Personal history of other diseases of the circulatory system     History of hypertension    Personal history of other diseases of the digestive system 11/18/2015    History of acute gastritis    Personal history of other diseases of the respiratory system 08/25/2014    Personal history of acute sinusitis    Personal history of other diseases of the respiratory system 02/23/2018    History of acute bronchitis    Personal history of other diseases of the respiratory system 10/31/2016    History of acute sinusitis    Personal history of other diseases of the respiratory system 12/16/2013    History of acute bronchitis    Personal history of other infectious and parasitic diseases 01/25/2017    History of candidiasis of mouth    Personal history of other specified conditions 06/19/2018    History of dizziness    Personal history of other specified conditions 12/28/2015    History of fatigue    Personal history of other specified conditions 11/12/2021    History of weight  loss    Personal history of other specified conditions 01/02/2019    History of urinary frequency    Personal history of other specified conditions 10/12/2022    History of abnormal weight loss    Personal history of other specified conditions     History of painful urination    Personal history of other specified conditions 03/26/2015    History of shortness of breath    Radiculopathy, cervical region 10/10/2017    Cervical radiculopathy    Segmental and somatic dysfunction of cervical region 05/03/2017    Cervical somatic dysfunction    Spondylosis without myelopathy or radiculopathy, cervical region 09/19/2017    Cervical spondylosis    Sprain of ligaments of lumbar spine, initial encounter 02/23/2018    Lumbar sprain, initial encounter    Unspecified hearing loss, right ear 08/29/2018    Hearing loss, right    Urinary tract infection, site not specified 09/13/2021    Acute lower UTI       No relevant family history has been documented for this patient.    Past Surgical History:   Procedure Laterality Date    BREAST LUMPECTOMY  09/25/2013    Breast Surgery Lumpectomy    COLONOSCOPY  03/26/2015    Complete Colonoscopy    ESOPHAGOGASTRODUODENOSCOPY  03/16/2016    Diagnostic Esophagogastroduodenoscopy    KNEE ARTHROSCOPY W/ DEBRIDEMENT  09/25/2013    Knee Arthroscopy (Therapeutic)    MR HEAD ANGIO WO IV CONTRAST  8/9/2023    MR HEAD ANGIO WO IV CONTRAST 8/9/2023 [facility]    MR NECK ANGIO WO IV CONTRAST  8/9/2023    MR NECK ANGIO WO IV CONTRAST 8/9/2023 [facility]    OTHER SURGICAL HISTORY  09/25/2013    Breast Surgery Enlargement Procedure Bilateral    OTHER SURGICAL HISTORY  09/25/2013    Breast Surgery Removal Of Mammary Implant Bilateral    OTHER SURGICAL HISTORY  04/18/2022    Cataract surgery    OTHER SURGICAL HISTORY  04/18/2022    Turbinate reduction    OTHER SURGICAL HISTORY  04/18/2022    [place] tooth extraction    OTHER SURGICAL HISTORY  11/20/2019    Cyst excision    TOTAL ABDOMINAL HYSTERECTOMY  09/25/2013     Total Abdominal Hysterectomy    TOTAL KNEE ARTHROPLASTY Right 05/22/2023    Dr. Potter       Social History     Substance and Sexual Activity   Drug Use Never     Tobacco Use: Low Risk  (1/17/2024)    Patient History     Smoking Tobacco Use: Never     Smokeless Tobacco Use: Never     Passive Exposure: Not on file     Alcohol Use: Not At Risk (1/11/2024)    AUDIT-C     Frequency of Alcohol Consumption: Never     Average Number of Drinks: Patient does not drink     Frequency of Binge Drinking: Never           Objective     Neurological Exam     Alert and oriented to person, place, time, and situation.   Facial muscles are symmetric with significant masked facies.  Hypophonic, psychomotor slowing.   Ocular versions intact.   Speech fluent to history and with mild hypophonia.   Cogwheel rigidity in BUE.   Bradykindetic in BUE and BLE for finger tapping and toe tapping. Worse on the right.   No tremors or dyskinesia.  Stooped posture, slow unsteady gait, significant shuffling. Retropulsion.       Last pill 1 hr before exam     Results for orders placed or performed in visit on 09/13/21   XR LUMBAR SPINE COMPLETE 4+ VIEWS    Narrative    MRN: [id number]  Patient Name: ANDREI LUDWIG     STUDY:  SPINE, LUMBOSACRAL  MIN 4 VIEWS; ;  9/13/2021 12:07 pm     INDICATION:  Low back pain.     COMPARISON:  February 5, 2016 radiographs     ACCESSION NUMBER(S):  [id number]     ORDERING CLINICIAN:  HALEY DAVILA     FINDINGS:  There is leftward curvature lumbar spine and grade 1 L3  anterolisthesis mildly progressed. Mild-to-moderate multilevel  spondylosis and degenerative disc changes are progressed, most  pronounced L4. No detected fracture, or suspicious osseous lesion. No  detected spondylolysis. Mild scattered arterial vascular  calcifications.     IMPRESSION:  Progressive lumbar malalignment, spondylosis and degenerative disc  changes.     Scattered arterial vascular calcifications.      Results for orders placed or performed  in visit on 06/22/21   DEXA BONE DENSITY AXIAL SKELETON W VFA    Narrative    =================================================================            Bone Density and Vertebral Assessment Report             =================================================================     Height:           64.5 in  Weight:           124.0 lb  Fractures:  Treatments:     -----------------------------------------------------------------     Indication: postmenopausal osteoporosis; prior fracture;     hip or spine fracture;     Referring Provider: HALEY DAVILA     Study: Bone densitometry and vertebral deformity assessment         were performed.     Exam Date: June 22, 2021     Accession number: [id number]     Findings: Standard measurements were obtained utilizing a Dual   Energy X-ray Absorptiometry bone densitometer. Data obtained   includes planar bone density measurements over the Left Femur,   VFA, and Lumbar Spine. Comparison of measured data and   standardized mean data for a young adult population (when peak   bone mass occurs) results in a T score. This represents the   number of standard deviations above or below the mean of a   young adult population. Comparison of measured data to   standards from an age adjusted population similarly yields a Z   score.            Bone Density:  -----------------------------------------------------------------  Region                   BMD    T-score  Z-score   Classification  -----------------------------------------------------------------  AP Spine(L1-L4)          0.744   -2.8     -0.4       Osteoporosis  Femoral Neck (Left)      0.484   -3.3     -1.2       Osteoporosis  Total Hip (Left)         0.620   -2.6     -0.9       Osteoporosis  -----------------------------------------------------------------     World Health Organization criteria for BMD impression   classify patients as:   Normal (T-score at or above -1.0),   Osteopenia (T-score between -1.0 and -2.5), or    Osteoporosis (T-score at or below -2.5).      10-year Fracture Risk:  -----------------------------------------------------------------  FRAX not reported because:    Some T-score for Spine Total or Hip Total or Femoral Neck at   or below -2.5     Prior hip or vertebral fracture  -----------------------------------------------------------------        Vertebral Deformity Assessment: Exam date 06/22/2021  -----------------------------------------------------------------  Vertebral Level                           Impression  -----------------------------------------------------------------  T4                                        Mild Biconcave   Deformity   T5                                        Normal  T6                                        Normal  T7                                        Mild Biconcave   Deformity   T8                                        Normal  T9                                        Normal  T10                                       Normal  T11                                       Normal  T12                                       Normal  L1                                        Normal  L2                                        Normal  L3                                        Normal  L4                                        Normal  -----------------------------------------------------------------  A spine fracture indicates 5X risk for subsequent spine fracture   and 2X risk for subsequent hip fracture.           Previous Exams:  -----------------------------------------------------------------  Region  Exam     Age  BMD   T-score  BMD Change     BMD Change           Date          g/cm2          vs Baseline    vs Previous  -----------------------------------------------------------------  AP Spine (L1-L4)       06/22/2021  76  0.744    -2.8      -2.8%          -2.8%            06/10/2019  74  0.765    -2.6                                    Total Hip(Left)       06/22/2021  76   0.620    -2.6      -2.5%          -2.5%            06/10/2019  74  0.636    -2.5                                 -----------------------------------------------------------------  *Denotes significance at 95% confidence level, LSC for AP Spine   = 0.022 g/cm2,  LSC for Total Hip = 0.027 g/cm2            Impression: The patient has osteoporosis as determined by WHO   criteria. Based on the results of the patient?s bone density   assessment, the risk of future fracture increases approximately   two fold for each 1.0 SD decrease in T-score.    However, low BMD is not the only risk factor for a future   fragility fracture. Other clinical risk factors for   osteoporotic fracture should be considered in ascertaining this   patient?s future fracture risk including the patient?s age,   previous osteoporotic (fragility) fracture, estrogen   deficiency/hypogonadal, risk of falling, use of medications   implicated in bone loss (glucocorticoids), family history of   osteoporotic fracture, diseases and conditions associated with   bone loss, low body weight, smoking, high bone turnover, etc.   Combining low BMD and other clinical risk factors result in a   more precise assessment of future fracture risk. Secondary   causes for osteoporosis, such as osteomalacia, other metabolic   bone disorders, and diseases and conditions that may contribute   to accelerated bone loss may have to be considered depending on   the clinical situation.    A repeat bone density assessment should be considered in two   years.      Patient has a Mild Biconcave Deformity fracture of vertebra T4.   Patient has a Mild Biconcave Deformity fracture of vertebra T7.         All images and detailed analysis are available on the    Radiology PACS.   Reported by: Paulie on 06/22/2021 9:16:00 AM.   Results for orders placed or performed in visit on 06/23/17   MR CERVICAL SPINE WO IV CONTRAST    Narrative    MRN: [id number]  Patient Name: ANDREI LUDWIG      STUDY:  NR MRI CERVICAL WO; 6/23/2017 7:24 am     INDICATION:  Signs/Symptoms: Left sided neck pain that radiates down into left arm.     COMPARISON:  Radiographs from June 16, 2017     ACCESSION NUMBER(S):  [id number]     ORDERING CLINICIAN:  HALEY DAVILA     TECHNIQUE:  Sagittal T1, T2, axial T1 and axial T2 weighted images were acquired  through the cervical spine.     FINDINGS:  Alignment: There is straightening of the normal cervical lordosis.     Vertebrae/Intervertebral Discs: The vertebral bodies demonstrate  expected height.Degenerative endplate signal changes are demonstrated  at C5/6 and C6/7. Disc space narrowing and endplate spurring are most  pronounced at C6/7 and C5/6.     Cord: No intrinsic signal abnormality is appreciated within the  cervical cord.     C1-C2: There are degenerative changes at the atlantodental interval.  There is no central canal stenosis.     C2-C3: There is no posterior disc contour abnormality. Degenerative  facet changes and uncovertebral joint hypertrophy are noted  bilaterally. There is mild left-sided neural foraminal narrowing.     C3-C4: There is trace disc bulging but no significant central canal  stenosis. Mild facet and uncovertebral joint hypertrophy are  demonstrated. The neural foramina are patent.     C4-C5: There is a tiny central disc protrusion but no significant  central canal stenosis. There is mild uncovertebral joint hypertrophy  on the left. Bilateral degenerative facet changes are also  demonstrated. There is mild left-sided neural foraminal narrowing.     C5-C6: There is a broad-based posterior disc/osteophyte complex  partially effacing the ventral subarachnoid space. There is mild  narrowing of the central canal. Degenerative facet changes and  uncovertebral joint hypertrophy contribute to moderate neural  foraminal stenosis.     C6-C7: There is a posterior disc/osteophyte complex partially  effacing the ventral subarachnoid space. There is mild  central canal  stenosis. Facet and uncovertebral joint hypertrophy contribute to  moderate left and mild right neural foraminal stenosis.     C7-T1: There is no posterior disc contour abnormality. There is no  significant central canal or neural foraminal stenosis.     A perineural root sleeve cyst is suspected on the left at T1/2.     The prevertebral and posterior paraspinous soft tissues are within  normal limits.     IMPRESSION:  Degenerative changes of the cervical spine primarily involving C5/6  and C6/7.   Results for orders placed or performed in visit on 06/16/17   XR CERVICAL SPINE COMPLETE 4-5 VIEWS    Narrative    MRN: [id number]  Patient Name: ANDREI LUDWIG     STUDY:  Cervical spine dated 6/16/2017.     INDICATION:  Left-sided neck pain that radiates down into left arm. No trauma.     COMPARISON:  None.     ACCESSION NUMBER(S):  [id number]     ORDERING CLINICIAN:  HALEY DAVILA     TECHNIQUE:  AP, lateral, bilateral oblique, odontoid, and Fuchs radiographs of  the cervical spine.     FINDINGS:  The cervical spine is seen to C7/T1. There is straightening of the  cervical lordosis. There is grade 1 anterolisthesis of C4 on C5.  vertebral body height and alignment are otherwise maintained. No  fracture or dislocation is evident.The dens and lateral masses are  intact.There is moderate C5-6 and C6-7 degenerative change with  bilateral neural foraminal narrowing. Mild degenerative changes seen  at other levels of the cervical spine.No prevertebral soft tissue  swelling is evident.Associated soft tissues are grossly unremarkable.     IMPRESSION:  1. Grade 1 anterolisthesis of C4 on C5. Straightening of the cervical  lordosis which could relate to patient positioning and/or muscle  spasm.  2. Degenerative changes discussed above.   Results for orders placed or performed in visit on 04/06/13   XR THORACIC SPINE 3 VIEWS    Narrative    EXAMINATION:      Thoracic spine     HISTORY:       PAIN     FINDINGS:      Two  views of the thoracic spine are obtained. No comparison studies   available.     Alignment is intact. The vertebral body heights and the disc heights   are preserved. Apparent osteopenia. Very minimal endplate sclerosis   and osteophytes involving the upper thoracic spine.                 IMPRESSION:      Very minimal discogenic degenerative changes of the upper thoracic   spine and mild osteopenia. Otherwise essentially negative thoracic   spine.     This report was dictated at [facility].              Assessment/Plan   Problem List Items Addressed This Visit       Parkinson's disease without dyskinesia, with fluctuating manifestations - Primary    Overview     On exam noted to have bradykinesia, rigidity, tremor worse on the left side along with gait and balance disturbance and writing changes.   Started on CD-LD 25/100 TID in the hospital by Dr. Ro          Current Assessment & Plan     Continue sinemet 25/100 three times daily   I will talk to Dr. Waldrop about sleep schedule   Try magnesium at night

## 2024-03-01 ENCOUNTER — TELEPHONE (OUTPATIENT)
Dept: PRIMARY CARE | Facility: CLINIC | Age: 79
End: 2024-03-01
Payer: MEDICARE

## 2024-03-01 DIAGNOSIS — G20.A2 PARKINSON'S DISEASE WITHOUT DYSKINESIA, WITH FLUCTUATING MANIFESTATIONS (MULTI): Primary | ICD-10-CM

## 2024-03-01 DIAGNOSIS — M54.16 LUMBAR RADICULAR PAIN: ICD-10-CM

## 2024-03-06 NOTE — CONSULTS
"    Service:   Service: Medicine     Consult:  Consult requested by (Attending Name): Willie Potter   Reason: Medical management     History of Present Illness:   Admission Reason: Right Knee Osteoarthritis   HPI:    ANDREI LUDWIG is a 78 year old Female with a medical history of HTN, CAD, and OA [place] [facility] [place] Dr. Potter. EBL 25ml. Had  some pre/perioperative accelerated HTN. Given IV Labetalol and Hydralazine with some improvement. Consulted for medical mangement. Seen and examined in her room this afternoon. Awake and alert. Pain currently is adequately controlled. Has a history of  HTN but states was taken off medication as elevated BP was attributed to \"stress\" and has unpleasant side-effects from many medications. Tried Lisinopril but caused a cough. Was on Amlodipine but affected her appetite. Sees Dr. Marquez routinely as her cardiologist.  She denies chest pain, breathing difficulties, abdominal pain, N/V/D/C. No fever or chills. Does report progressive, unintentional weight loss.     Past Medical History  Osteoarthritis  Hypertension  Migraine Headaches  Vertigo  Coronary Artery Disease  Depression/Anxiety  Osteoporosis   Fibromyalgia   GERD  Hyperlipidemia  Irritable Bowel Syndrome  Urinary Incontinence   Peripheral Vascular Disease  Carotid Artery Stenosis  Serotonin Syndrome   TMJ  +COVID 19 8/19/22    Past Surgical History  Bilateral Cataracts Surgery   Tonsillectomy  Hysterectomy with BSO  Breast Implants and then removed  EGD  [place] Teeth Removed  Breast Lumpectomy  Cyst Excision   Knee Arthroscopy  Turbinate Reduction  soft tissue excised from scalp       Review Family/Social History and ROS:   Family History:  Cancer: yes   CAD: yes   Hypertension: yes     Social History:    Smoking Status: never smoker  (1)   Alcohol Use: denies (1)   Drug Use: denies  (1)   Occupation: Retired - Worked for [employer]   Social History:    Single. No children. Lives alone. " Uses walker.     Constitutional: POSITIVE: Weight Loss; NEGATIVE:  Fever, Chills     Eyes: NEGATIVE: Blurry Vision, Vision Loss/ Change     ENMT: NEGATIVE: Nasal Discharge, Nasal Congestion,  Ear Pain, Mouth Pain, Throat Pain     Respiratory: NEGATIVE: Dry Cough, Productive Cough,  Hemoptysis, Wheezing, Shortness of Breath     Cardiac: NEGATIVE: Chest Pain, Dyspnea on Exertion,  Orthopnea, Palpitations, Syncope     Gastrointestinal: POSITIVE: Nausea; NEGATIVE: Vomiting,  Diarrhea, Constipation, Abdominal Pain     Genitourinary: NEGATIVE: Dysuria, Frequency, Hematuria;  COMMENTS: + incontinence     Musculoskeletal: COMMENTS: See HPI     Neurological: NEGATIVE: Dizziness, Confusion, Headache,  Seizures, Syncope     Psychiatric: NEGATIVE: Mood Changes, Anxiety     Skin: NEGATIVE: Rash, Ulcer     Endocrine: NEGATIVE: Sweat     Hematologic/Lymph: NEGATIVE: Anemia, Bruising              Allergies:  ·  aspirin : Resp Distress  ·  Levaquin : Unknown  ·  Motrin : Resp Distress  ·  ampicillin : Itching  ·  baclofen : Resp Distress, Itching  ·  Celexa : Unknown  ·  Viibryd : Unknown  ·  Demerol : Unknown  ·  iodine : Other  ·  cefdinir : Unknown  ·  gabapentin : Other, Itching  ·  morphine : Unknown  ·  statins : Other  ·  penicillins : Resp Distress  ·  sulfa  drugs: Anaphylaxis  ·  Shell  Fish: Unknown  ·  Nuts : Unknown       Intolerances:  ·  losartan : Myalgia  ·  meloxicam : Fatigue    Objective:     Objective Information:        T   P  R  BP   MAP  SpO2   Value  36.3  107  16  140/89   106  96%  Date/Time 5/22 17:19 5/22 17:19 5/22 17:19 5/22 17:19  5/22 17:19 5/22 17:19  Range  (36.2C - 36.3C )  (107 - 125 )  (16 - 18 )  (140 - 201 )/ (89 - 122 )  (106 - 148 )  (95% - 96% )    Physical Exam Narrative:  ·  Physical Exam:    General: A&O x 3; NAD; calm and cooperative; thin  Eyes: EOM's intact. Clear sclera.   HEENT: Atraumatic. Normocephalic. Mucous membranes moist.   Lungs: CTAB; respirations even and unlabored on room  air.   Heart: Regular rate; rapid with  on [place]  Abdomen:  Soft, non-tender, non-distended; normoactive bowel sounds  Extremities: WRIGHT with RLE weakness/pain; no edema; peripheral pulses intact. Right leg ACE wrapped - C/D/I.   Neuro: A&O x 3; gross motor and sensation intact; no focal deficits  Skin: Warm, dry, and intact  Psych: Appropriate mood and behavior     Refresh Home Medications List:  ·  Select to Refresh Home Medication List Refresh Home Medications     Medications:    Medications:          Continuous Medications       --------------------------------    1. Lactated Ringers Infusion:  1000  mL  IntraVenous  <Continuous>         Scheduled Medications       --------------------------------    1. Apixaban:  2.5  mg  Oral  Every 12 Hours    2. Docusate:  100  mg  Oral  2 Times a Day    3. Fluticasone 50 microgram/ Nasal Inhalation:  2  spray(s)  Each Nostril  Daily    4. Polyethylene Glycol:  17  gram(s)  Oral  2 Times a Day    5. Vancomycin IV Piggy Back:  750  mg  IntraVenous Piggyback  Every 12 Hours         PRN Medications       --------------------------------    1. diphenhydrAMINE:  25  mg  Oral  Every 24 Hours    2. hydrALAZINE (APRESOLINE) Injectable:  10  mg  IntraVenous Push  Every 4 Hours    3. Ondansetron Injectable:  4  mg  IntraVenous Push  Every 6 Hours    4. traMADol:  50  mg  Oral  Every 6 Hours    5. traMADol:  100  mg  Oral  Every 6 Hours        Radiology Results:    Results:        Impression:    1. Status post right total knee arthroplasty with satisfactory  hardware alignment..     Xray Knee 1 or 2 View [May 22 2023 12:35PM]        Assessment:    78 year old Female with a medical history of HTN, CAD, and OA who [place] [facility] [place] by Dr. Potter. EBL 25ml. Had some pre/perioperative  accelerated HTN.  Consulted for medical management. Seen and examined in her room this afternoon.     Hypertension - Accelerated  -Currently not on agent for HTN (multiple  "allergies/intolerances)  -BP improved after given IV Hydralazine upon arrival to the unit  -Will monitor closely and continue with current Hydralazine order  -Add agent if persistently >160  -Place on telemetry overnight    Right Knee Osteoarthritis  -s/p right total knee arthroscopy by Dr. Potter today  -Incisional care, dressing changes, prophylactic antibiotics, and pain management per Orthopedics.   -PT/OT consulted. WBAT.   -Medicate for pain as needed.   -Advised IS use, mobilization.   -Maintain bowel regimen  -DVT prophylaxis per surgery  -Monitor H&H for ABLA.  -CBC in AM.     CV: CAD, PVD, LIT  -Allergy to statins/aspirin    Depression/Anxiety/Fibromyalgia  -Supportive care    DVT Prophylaxis   -Apixaban per orthopedics    Disposition  -Plan of care discussed with medicine, Dr. Gonzalez.   -Medical records reviewed of PCP.   ->60 minutes spent in chart review, evaluation of patient, and discussion of plan of care with medical staff.     Thank you very much for the consult. Please call/doc halo with any medical issues.     Consult Status:  Consult Status    (select all that apply): initial  consult complete, will follow   Consult Order ID: [id number]       Electronic Signatures:  Natasha Lopes (APRN-CNP)  (Signed 22-May-2023 18:42)   Authored: Service, History of Present Illness, Review  Family/Social History and ROS, Allergies, Objective, Assessment/Recommendations, Note Completion      Last Updated: 22-May-2023 18:42 by Natasha Lopes (APRN-CNP)    References:  1.  Data Referenced From \"Patient Profile - Adult v2\" 22-May-2023 13:40   "

## 2024-03-13 ENCOUNTER — APPOINTMENT (OUTPATIENT)
Dept: RADIOLOGY | Facility: HOSPITAL | Age: 79
End: 2024-03-13
Payer: MEDICARE

## 2024-03-15 DIAGNOSIS — R21 RASH: Primary | ICD-10-CM

## 2024-03-15 RX ORDER — TRIAMCINOLONE ACETONIDE 1 MG/G
CREAM TOPICAL 2 TIMES DAILY
Qty: 30 G | Refills: 1 | Status: SHIPPED | OUTPATIENT
Start: 2024-03-15 | End: 2024-05-03 | Stop reason: WASHOUT

## 2024-03-18 ENCOUNTER — APPOINTMENT (OUTPATIENT)
Dept: RADIOLOGY | Facility: HOSPITAL | Age: 79
End: 2024-03-18
Payer: MEDICARE

## 2024-03-18 ENCOUNTER — APPOINTMENT (OUTPATIENT)
Dept: CARDIOLOGY | Facility: HOSPITAL | Age: 79
End: 2024-03-18
Payer: MEDICARE

## 2024-03-18 ENCOUNTER — HOSPITAL ENCOUNTER (EMERGENCY)
Facility: HOSPITAL | Age: 79
Discharge: HOME | End: 2024-03-18
Attending: EMERGENCY MEDICINE
Payer: MEDICARE

## 2024-03-18 VITALS
BODY MASS INDEX: 18.95 KG/M2 | HEIGHT: 64 IN | TEMPERATURE: 99 F | HEART RATE: 87 BPM | WEIGHT: 111 LBS | SYSTOLIC BLOOD PRESSURE: 184 MMHG | RESPIRATION RATE: 16 BRPM | DIASTOLIC BLOOD PRESSURE: 115 MMHG | OXYGEN SATURATION: 99 %

## 2024-03-18 DIAGNOSIS — G89.29 CHRONIC LEFT SHOULDER PAIN: Primary | ICD-10-CM

## 2024-03-18 DIAGNOSIS — M25.512 CHRONIC LEFT SHOULDER PAIN: Primary | ICD-10-CM

## 2024-03-18 DIAGNOSIS — R00.2 PALPITATIONS: ICD-10-CM

## 2024-03-18 DIAGNOSIS — I49.8 SINUS ARRHYTHMIA: ICD-10-CM

## 2024-03-18 LAB
ALBUMIN SERPL BCP-MCNC: 4.3 G/DL (ref 3.4–5)
ALP SERPL-CCNC: 63 U/L (ref 33–136)
ALT SERPL W P-5'-P-CCNC: 21 U/L (ref 7–45)
ANION GAP SERPL CALC-SCNC: 16 MMOL/L (ref 10–20)
APPEARANCE UR: CLEAR
AST SERPL W P-5'-P-CCNC: 19 U/L (ref 9–39)
BASOPHILS # BLD AUTO: 0.03 X10*3/UL (ref 0–0.1)
BASOPHILS NFR BLD AUTO: 0.4 %
BILIRUB SERPL-MCNC: 0.3 MG/DL (ref 0–1.2)
BILIRUB UR STRIP.AUTO-MCNC: NEGATIVE MG/DL
BUN SERPL-MCNC: 32 MG/DL (ref 6–23)
CALCIUM SERPL-MCNC: 10 MG/DL (ref 8.6–10.3)
CARDIAC TROPONIN I PNL SERPL HS: 7 NG/L (ref 0–13)
CARDIAC TROPONIN I PNL SERPL HS: 8 NG/L (ref 0–13)
CHLORIDE SERPL-SCNC: 104 MMOL/L (ref 98–107)
CO2 SERPL-SCNC: 28 MMOL/L (ref 21–32)
COLOR UR: YELLOW
CREAT SERPL-MCNC: 0.8 MG/DL (ref 0.5–1.05)
EGFRCR SERPLBLD CKD-EPI 2021: 76 ML/MIN/1.73M*2
EOSINOPHIL # BLD AUTO: 0.1 X10*3/UL (ref 0–0.4)
EOSINOPHIL NFR BLD AUTO: 1.3 %
ERYTHROCYTE [DISTWIDTH] IN BLOOD BY AUTOMATED COUNT: 12.4 % (ref 11.5–14.5)
GLUCOSE SERPL-MCNC: 100 MG/DL (ref 74–99)
GLUCOSE UR STRIP.AUTO-MCNC: NEGATIVE MG/DL
HCT VFR BLD AUTO: 38.6 % (ref 36–46)
HGB BLD-MCNC: 12.7 G/DL (ref 12–16)
IMM GRANULOCYTES # BLD AUTO: 0.02 X10*3/UL (ref 0–0.5)
IMM GRANULOCYTES NFR BLD AUTO: 0.3 % (ref 0–0.9)
KETONES UR STRIP.AUTO-MCNC: NEGATIVE MG/DL
LEUKOCYTE ESTERASE UR QL STRIP.AUTO: NEGATIVE
LYMPHOCYTES # BLD AUTO: 1.42 X10*3/UL (ref 0.8–3)
LYMPHOCYTES NFR BLD AUTO: 18.7 %
MAGNESIUM SERPL-MCNC: 2.03 MG/DL (ref 1.6–2.4)
MCH RBC QN AUTO: 30.9 PG (ref 26–34)
MCHC RBC AUTO-ENTMCNC: 32.9 G/DL (ref 32–36)
MCV RBC AUTO: 94 FL (ref 80–100)
MONOCYTES # BLD AUTO: 0.66 X10*3/UL (ref 0.05–0.8)
MONOCYTES NFR BLD AUTO: 8.7 %
MUCOUS THREADS #/AREA URNS AUTO: NORMAL /LPF
NEUTROPHILS # BLD AUTO: 5.35 X10*3/UL (ref 1.6–5.5)
NEUTROPHILS NFR BLD AUTO: 70.6 %
NITRITE UR QL STRIP.AUTO: NEGATIVE
NRBC BLD-RTO: 0 /100 WBCS (ref 0–0)
PH UR STRIP.AUTO: 5 [PH]
PLATELET # BLD AUTO: 252 X10*3/UL (ref 150–450)
POTASSIUM SERPL-SCNC: 4.2 MMOL/L (ref 3.5–5.3)
PROT SERPL-MCNC: 6.7 G/DL (ref 6.4–8.2)
PROT UR STRIP.AUTO-MCNC: NEGATIVE MG/DL
RBC # BLD AUTO: 4.11 X10*6/UL (ref 4–5.2)
RBC # UR STRIP.AUTO: ABNORMAL /UL
RBC #/AREA URNS AUTO: NORMAL /HPF
SODIUM SERPL-SCNC: 144 MMOL/L (ref 136–145)
SP GR UR STRIP.AUTO: 1.02
UROBILINOGEN UR STRIP.AUTO-MCNC: <2 MG/DL
WBC # BLD AUTO: 7.6 X10*3/UL (ref 4.4–11.3)
WBC #/AREA URNS AUTO: NORMAL /HPF

## 2024-03-18 PROCEDURE — 80053 COMPREHEN METABOLIC PANEL: CPT | Performed by: NURSE PRACTITIONER

## 2024-03-18 PROCEDURE — 81001 URINALYSIS AUTO W/SCOPE: CPT | Performed by: EMERGENCY MEDICINE

## 2024-03-18 PROCEDURE — 71045 X-RAY EXAM CHEST 1 VIEW: CPT

## 2024-03-18 PROCEDURE — 84484 ASSAY OF TROPONIN QUANT: CPT | Performed by: NURSE PRACTITIONER

## 2024-03-18 PROCEDURE — 71045 X-RAY EXAM CHEST 1 VIEW: CPT | Mod: FOREIGN READ | Performed by: RADIOLOGY

## 2024-03-18 PROCEDURE — 85025 COMPLETE CBC W/AUTO DIFF WBC: CPT | Performed by: NURSE PRACTITIONER

## 2024-03-18 PROCEDURE — 73030 X-RAY EXAM OF SHOULDER: CPT | Mod: LEFT SIDE | Performed by: RADIOLOGY

## 2024-03-18 PROCEDURE — 36415 COLL VENOUS BLD VENIPUNCTURE: CPT | Performed by: NURSE PRACTITIONER

## 2024-03-18 PROCEDURE — 2500000005 HC RX 250 GENERAL PHARMACY W/O HCPCS: Performed by: NURSE PRACTITIONER

## 2024-03-18 PROCEDURE — 93005 ELECTROCARDIOGRAM TRACING: CPT

## 2024-03-18 PROCEDURE — 83735 ASSAY OF MAGNESIUM: CPT | Performed by: NURSE PRACTITIONER

## 2024-03-18 PROCEDURE — 73030 X-RAY EXAM OF SHOULDER: CPT | Mod: LT

## 2024-03-18 PROCEDURE — 99283 EMERGENCY DEPT VISIT LOW MDM: CPT | Mod: 25

## 2024-03-18 RX ORDER — LIDOCAINE 560 MG/1
1 PATCH PERCUTANEOUS; TOPICAL; TRANSDERMAL ONCE
Status: DISCONTINUED | OUTPATIENT
Start: 2024-03-18 | End: 2024-03-18 | Stop reason: HOSPADM

## 2024-03-18 RX ADMIN — LIDOCAINE 1 PATCH: 4 PATCH TOPICAL at 18:44

## 2024-03-18 ASSESSMENT — HEART SCORE
HEART SCORE: 3
HISTORY: SLIGHTLY SUSPICIOUS
AGE: 65+
TROPONIN: LESS THAN OR EQUAL TO NORMAL LIMIT
ECG: NORMAL
RISK FACTORS: 1-2 RISK FACTORS

## 2024-03-18 ASSESSMENT — PAIN DESCRIPTION - PAIN TYPE: TYPE: ACUTE PAIN

## 2024-03-18 ASSESSMENT — PAIN DESCRIPTION - LOCATION: LOCATION: SHOULDER

## 2024-03-18 ASSESSMENT — COLUMBIA-SUICIDE SEVERITY RATING SCALE - C-SSRS
1. IN THE PAST MONTH, HAVE YOU WISHED YOU WERE DEAD OR WISHED YOU COULD GO TO SLEEP AND NOT WAKE UP?: NO
6. HAVE YOU EVER DONE ANYTHING, STARTED TO DO ANYTHING, OR PREPARED TO DO ANYTHING TO END YOUR LIFE?: NO
2. HAVE YOU ACTUALLY HAD ANY THOUGHTS OF KILLING YOURSELF?: NO

## 2024-03-18 ASSESSMENT — LIFESTYLE VARIABLES
EVER FELT BAD OR GUILTY ABOUT YOUR DRINKING: NO
HAVE YOU EVER FELT YOU SHOULD CUT DOWN ON YOUR DRINKING: NO
HAVE PEOPLE ANNOYED YOU BY CRITICIZING YOUR DRINKING: NO
EVER HAD A DRINK FIRST THING IN THE MORNING TO STEADY YOUR NERVES TO GET RID OF A HANGOVER: NO

## 2024-03-18 ASSESSMENT — PAIN DESCRIPTION - DESCRIPTORS: DESCRIPTORS: CRAMPING

## 2024-03-18 ASSESSMENT — PAIN SCALES - GENERAL: PAINLEVEL_OUTOF10: 6

## 2024-03-18 ASSESSMENT — PAIN - FUNCTIONAL ASSESSMENT: PAIN_FUNCTIONAL_ASSESSMENT: 0-10

## 2024-03-18 ASSESSMENT — PAIN DESCRIPTION - ORIENTATION: ORIENTATION: LEFT

## 2024-03-18 NOTE — ED PROVIDER NOTES
"HPI   Chief Complaint   Patient presents with    Rapid Heart Rate     Sent in by a visiting nurse for intermittent aFib.  This morning        79-year-old female with a history of Parkinson's who ambulates with a walker presents today with left shoulder pain and palpitations.  She feels short of breath when she tries to ambulate.  Last month she was diagnosed with atrial fibs.  She is not anticoagulated but she is on Plavix 75 mg daily for a remote history of a TIA August 2023.  She denies any recent trauma or fall.  She states that she feels the palpitations and become short of breath.  She endorses a cough.  She feels as if her heart is \"flip-flopping\".  The shoulder pain is rated 8 out of 10.  She denies becoming diaphoretic.  She denies becoming nauseous.  She denies any recent trauma and the last time she fell was a year ago August.      History provided by:  Patient and relative   used: No                        [place] Coma Scale Score: 15   HEART Score: 3       NIH Stroke Scale: 0             Patient History   Past Medical History:   Diagnosis Date    Acute bronchitis due to other specified organisms 01/15/2020    Acute bronchitis due to other specified organisms    Acute maxillary sinusitis, unspecified 10/10/2017    Acute non-recurrent maxillary sinusitis    Bitten or stung by nonvenomous insect and other nonvenomous arthropods, initial encounter 07/12/2016    Bedbug bite, initial encounter    Cervicalgia 02/28/2023    Closed compression fracture of thoracic vertebra (CMS/HCC) 02/28/2023    Encounter for screening for cardiovascular disorders 04/11/2016    Encounter for screening for cardiovascular disorders    Encounter for screening for diabetes mellitus 04/11/2016    Encounter for screening for diabetes mellitus    Encounter for screening for lipoid disorders 04/11/2016    Encounter for screening for lipoid disorders    Encounter for screening for osteoporosis 03/30/2021    " Osteoporosis screening    Enteroviral vesicular pharyngitis 08/19/2015    Herpangina    Facial droop 09/07/2023    Gastroduodenitis 09/07/2023    Impacted cerumen, left ear 06/19/2018    Impacted cerumen of left ear    Impacted cerumen, right ear 08/29/2018    Impacted cerumen of right ear    Localized enlarged lymph nodes 10/30/2019    Cervical lymphadenopathy    Localized enlarged lymph nodes 11/30/2016    Lymphadenopathy, inguinal    Localized swelling, mass and lump, head 09/23/2019    Lump of scalp    Oral mucositis (ulcerative), unspecified 09/07/2023    Osteoarthritis 02/28/2023    Otalgia, right ear 01/11/2016    Right ear pain    Other acute sinusitis 01/15/2020    Other acute sinusitis, recurrence not specified    Other fecal abnormalities 11/15/2018    Positive FIT (fecal immunochemical test)    Other forms of stomatitis 01/25/2017    Oral ulcer    Other malaise 11/20/2017    Malaise and fatigue    Other specified disorders of eustachian tube, bilateral 02/23/2018    Dysfunction of both eustachian tubes    Other specified disorders of eustachian tube, right ear 01/11/2016    Dysfunction of right eustachian tube    Personal history of diseases of the skin and subcutaneous tissue 01/06/2017    History of impetigo    Personal history of other diseases of the circulatory system     History of hypertension    Personal history of other diseases of the digestive system 11/18/2015    History of acute gastritis    Personal history of other diseases of the respiratory system 08/25/2014    Personal history of acute sinusitis    Personal history of other diseases of the respiratory system 02/23/2018    History of acute bronchitis    Personal history of other diseases of the respiratory system 10/31/2016    History of acute sinusitis    Personal history of other diseases of the respiratory system 12/16/2013    History of acute bronchitis    Personal history of other infectious and parasitic diseases 01/25/2017     History of candidiasis of mouth    Personal history of other specified conditions 06/19/2018    History of dizziness    Personal history of other specified conditions 12/28/2015    History of fatigue    Personal history of other specified conditions 11/12/2021    History of weight loss    Personal history of other specified conditions 01/02/2019    History of urinary frequency    Personal history of other specified conditions 10/12/2022    History of abnormal weight loss    Personal history of other specified conditions     History of painful urination    Personal history of other specified conditions 03/26/2015    History of shortness of breath    Radiculopathy, cervical region 10/10/2017    Cervical radiculopathy    Segmental and somatic dysfunction of cervical region 05/03/2017    Cervical somatic dysfunction    Spondylosis without myelopathy or radiculopathy, cervical region 09/19/2017    Cervical spondylosis    Sprain of ligaments of lumbar spine, initial encounter 02/23/2018    Lumbar sprain, initial encounter    Unspecified hearing loss, right ear 08/29/2018    Hearing loss, right    Urinary tract infection, site not specified 09/13/2021    Acute lower UTI     Past Surgical History:   Procedure Laterality Date    BREAST LUMPECTOMY  09/25/2013    Breast Surgery Lumpectomy    COLONOSCOPY  03/26/2015    Complete Colonoscopy    ESOPHAGOGASTRODUODENOSCOPY  03/16/2016    Diagnostic Esophagogastroduodenoscopy    KNEE ARTHROSCOPY W/ DEBRIDEMENT  09/25/2013    Knee Arthroscopy (Therapeutic)    MR HEAD ANGIO WO IV CONTRAST  8/9/2023    MR HEAD ANGIO WO IV CONTRAST 8/9/2023 [facility]    MR NECK ANGIO WO IV CONTRAST  8/9/2023    MR NECK ANGIO WO IV CONTRAST 8/9/2023 [facility]    OTHER SURGICAL HISTORY  09/25/2013    Breast Surgery Enlargement Procedure Bilateral    OTHER SURGICAL HISTORY  09/25/2013    Breast Surgery Removal Of Mammary Implant Bilateral    OTHER SURGICAL HISTORY  04/18/2022    Cataract surgery    OTHER  SURGICAL HISTORY  04/18/2022    Turbinate reduction    OTHER SURGICAL HISTORY  04/18/2022    [place] tooth extraction    OTHER SURGICAL HISTORY  11/20/2019    Cyst excision    TOTAL ABDOMINAL HYSTERECTOMY  09/25/2013    Total Abdominal Hysterectomy    TOTAL KNEE ARTHROPLASTY Right 05/22/2023    Dr. Potter     Family History   Problem Relation Name Age of Onset    Allergies Mother      Heart failure Mother      Mental illness Mother       Social History     Tobacco Use    Smoking status: Never    Smokeless tobacco: Never   Substance Use Topics    Alcohol use: Never    Drug use: Never       Physical Exam   ED Triage Vitals [03/18/24 1530]   Temperature Heart Rate Respirations BP   37.2 °C (99 °F) (!) 108 18 (!) 198/118      Pulse Ox Temp Source Heart Rate Source Patient Position   97 % Skin Monitor Sitting      BP Location FiO2 (%)     Left arm --       Physical Exam  Constitutional:       Appearance: Normal appearance.   HENT:      Head: Normocephalic and atraumatic.      Nose: Nose normal.      Mouth/Throat:      Mouth: Mucous membranes are moist.   Eyes:      Extraocular Movements: Extraocular movements intact.      Pupils: Pupils are equal, round, and reactive to light.   Cardiovascular:      Rate and Rhythm: Tachycardia present. Rhythm irregular.      Pulses: Normal pulses.      Heart sounds: Normal heart sounds.   Pulmonary:      Effort: Pulmonary effort is normal.      Breath sounds: Normal breath sounds.   Abdominal:      General: Abdomen is flat.      Palpations: Abdomen is soft.   Musculoskeletal:         General: Normal range of motion.      Cervical back: Normal range of motion and neck supple.   Skin:     General: Skin is warm and dry.      Capillary Refill: Capillary refill takes less than 2 seconds.   Neurological:      General: No focal deficit present.      Mental Status: She is alert and oriented to person, place, and time.   Psychiatric:         Mood and Affect: Mood normal.         Behavior:  "Behavior normal.         ED Course & MDM   Diagnoses as of 03/18/24 1847   Chronic left shoulder pain   Palpitations   Sinus arrhythmia       Medical Decision Making  Blood pressure initially was 198/118 but a second recheck when patient was in the room was now 177/98.  She did not take her antihypertensive today.  Her pulse was 98 bpm.  Respess 18 and pulse ox was 97% on room air.  I gave her a lidocaine 4% patch for her shoulder pain.  Since she was experiencing shoulder pain without fall a cardiac workup was ordered.  Her first troponin was only 7.  Her metabolic panel was essentially normal for patient.  Her magnesium was 2.03.  CBC was negative for leukocytosis or left shift.  Chest x-ray did not appear to have an acute cardiopulmonary process.  Urinalysis was ordered as patient indicated that her urine had a \"foul odor\".  Seen and staffed with attending who will take over care and await results of second troponin and urinalysis.    Amount and/or Complexity of Data Reviewed  ECG/medicine tests: ordered and independent interpretation performed.     Details: EKG is sinus rhythm with marked sinus arrhythmia.  This is unchanged from prior EKG.  88 bpm.  DC interval is normal.  QT corrected is normal.  Left axis.  No ST elevation or depression.  QRS is narrow.  P waves are tied to the QRS.  Interpreted both by attending and myself        Procedure  Procedures     Tenzin Cage, UMESH-CNP  03/18/24 1847    "

## 2024-03-19 ENCOUNTER — TELEPHONE (OUTPATIENT)
Dept: PRIMARY CARE | Facility: CLINIC | Age: 79
End: 2024-03-19
Payer: MEDICARE

## 2024-03-19 LAB — HOLD SPECIMEN: NORMAL

## 2024-03-19 NOTE — TELEPHONE ENCOUNTER
Pt went to ER for Afib and they told her to make some follow up apt. She has one this Friday with Dr. Marquez but she wasn't sure if you wanted to see her as well. Told her that I would let you know and call her back.

## 2024-03-21 LAB
ATRIAL RATE: 108 BPM
ATRIAL RATE: 88 BPM
P AXIS: 50 DEGREES
P AXIS: 51 DEGREES
P OFFSET: 203 MS
P OFFSET: 213 MS
P ONSET: 160 MS
P ONSET: 168 MS
PR INTERVAL: 126 MS
PR INTERVAL: 136 MS
Q ONSET: 228 MS
Q ONSET: 231 MS
QRS COUNT: 15 BEATS
QRS COUNT: 18 BEATS
QRS DURATION: 68 MS
QRS DURATION: 68 MS
QT INTERVAL: 326 MS
QT INTERVAL: 350 MS
QTC CALCULATION(BAZETT): 423 MS
QTC CALCULATION(BAZETT): 436 MS
QTC FREDERICIA: 396 MS
QTC FREDERICIA: 397 MS
R AXIS: -40 DEGREES
R AXIS: -8 DEGREES
T AXIS: 46 DEGREES
T AXIS: 50 DEGREES
T OFFSET: 394 MS
T OFFSET: 403 MS
VENTRICULAR RATE: 108 BPM
VENTRICULAR RATE: 88 BPM

## 2024-04-04 DIAGNOSIS — G20.A1 PARKINSON'S DISEASE, UNSPECIFIED WHETHER DYSKINESIA PRESENT, UNSPECIFIED WHETHER MANIFESTATIONS FLUCTUATE (MULTI): ICD-10-CM

## 2024-04-04 RX ORDER — CARBIDOPA AND LEVODOPA 25; 100 MG/1; MG/1
1 TABLET ORAL 3 TIMES DAILY
Qty: 270 TABLET | Refills: 3 | Status: SHIPPED | OUTPATIENT
Start: 2024-04-04 | End: 2024-05-03 | Stop reason: SDUPTHER

## 2024-05-03 ENCOUNTER — OFFICE VISIT (OUTPATIENT)
Dept: NEUROLOGY | Facility: CLINIC | Age: 79
End: 2024-05-03
Payer: MEDICARE

## 2024-05-03 ENCOUNTER — HOME HEALTH ADMISSION (OUTPATIENT)
Dept: HOME HEALTH SERVICES | Facility: HOME HEALTH | Age: 79
End: 2024-05-03
Payer: MEDICARE

## 2024-05-03 ENCOUNTER — DOCUMENTATION (OUTPATIENT)
Dept: HOME HEALTH SERVICES | Facility: HOME HEALTH | Age: 79
End: 2024-05-03

## 2024-05-03 VITALS
BODY MASS INDEX: 19.63 KG/M2 | WEIGHT: 115 LBS | DIASTOLIC BLOOD PRESSURE: 74 MMHG | HEART RATE: 72 BPM | HEIGHT: 64 IN | SYSTOLIC BLOOD PRESSURE: 158 MMHG

## 2024-05-03 DIAGNOSIS — G20.A2 PARKINSON'S DISEASE WITHOUT DYSKINESIA, WITH FLUCTUATING MANIFESTATIONS (MULTI): Primary | ICD-10-CM

## 2024-05-03 DIAGNOSIS — G20.A1 PARKINSON'S DISEASE, UNSPECIFIED WHETHER DYSKINESIA PRESENT, UNSPECIFIED WHETHER MANIFESTATIONS FLUCTUATE (MULTI): ICD-10-CM

## 2024-05-03 PROCEDURE — 99214 OFFICE O/P EST MOD 30 MIN: CPT

## 2024-05-03 PROCEDURE — 3078F DIAST BP <80 MM HG: CPT

## 2024-05-03 PROCEDURE — 3077F SYST BP >= 140 MM HG: CPT

## 2024-05-03 PROCEDURE — 1159F MED LIST DOCD IN RCRD: CPT

## 2024-05-03 PROCEDURE — 1036F TOBACCO NON-USER: CPT

## 2024-05-03 PROCEDURE — 1160F RVW MEDS BY RX/DR IN RCRD: CPT

## 2024-05-03 RX ORDER — CARBIDOPA AND LEVODOPA 25; 100 MG/1; MG/1
1 TABLET ORAL 4 TIMES DAILY
Qty: 270 TABLET | Refills: 3 | Status: SHIPPED | OUTPATIENT
Start: 2024-05-03

## 2024-05-03 RX ORDER — APIXABAN 5 MG/1
1 TABLET, FILM COATED ORAL 2 TIMES DAILY
COMMUNITY
Start: 2024-03-22

## 2024-05-03 NOTE — HH CARE COORDINATION
Home Care received a Referral for Physical Therapy. We have processed the referral for a Start of Care on 5/4-5/5.     If you have any questions or concerns, please feel free to contact us at [phone]. Follow the prompts, enter your five digit zip code, and you will be directed to your care team on E[facility] 2.

## 2024-05-03 NOTE — PATIENT INSTRUCTIONS
Lets try to dose you at 4 times per day and since you are up at 5 am, you can take your first dose earlier. Hopefully you will feel better when you actually get out of bed at 7, and you also will not have to take it with your other morning meds since that seems to cause some side effects for you.     Take 1 tablet at 5 am, 9 am, 1 pm, 5 pm.  We can try to add a half tablet to one of or two of your doses if this is still not enough medication.     -Support groups may be helpful to patients and their families with Parkinson's disease. Organizations such as the Parkinson's Foundation, American Parkinson Disease Association, and National [place] of Neurological Disorders and Stroke may also be helpful as they can provide reliable information and resources. The Parkinson's Disease Handbook is also available through the American Parkinson Disease Association, which can provide helpful information to patients and families. In the [place] area, particular events and programs geared toward Parkinson's Disease include [employer], [employer], and the annual Parkinson's Bootcamp.   -Studies have suggested that exercise can slow the progression of disease, and it also can help patients feel better physically, mentally, and allow for social interaction. It also can help with the stiffness and bent posture that some patients with Parkinson's experience. Exercise routines should include things like dance and/or chay chi which can help with balance and flexibility. Other aerobic exercises like walking, biking, or swimming can also be helpful. It may be helpful to work with a physical therapist.   -Safety for patients with Parkinson's disease is very important. We want to prevent falls as much as possible, and this risk increases as the disease progresses. Consider changes in the home to make the bathtub or shower safer. Avoid have loose rugs around the house and try to make sure the house is well lit especially at night.  "Again, physical therapy may be helpful for falls prevention. Speech therapy may also be helpful to help with slurred or quiet speech, and to help avoid choking and problems swallowing. [facility] can help located Parkinson-trained speech therapists near you.   -Finally, Mediterranean style diet may help decrease the risk of dementia and Alzheimer 's disease. Some patients struggle with constipation from the Parkinson's disease or the medications. Make sure you speak with your provider if you are experiencing this, but some steps including increasing hydration or using a stool softener may be helpful.     The above information is paraphrased from \"UpToDate Patient Education: Parkinson disease treatment options- education, support, and therapy (Beyond The Basics)\"   "

## 2024-05-03 NOTE — PROGRESS NOTES
"Subjective     Kaitlin Robles is a  79 y.o.female  with PMH of osteoporosis, OA, HTN, HLD, GERD, IBS, PVD, CAD, anxiety, depression, migraines, vertigo, fibromyalgia,  urge incontinence, carotid artery stenosis who presents with   Chief Complaint   Patient presents with    Parkinson's Disease   . .    Visit type: follow up visit    HPI   Last seen by me in 2024.   At that time, she had been doing OK on CD-LD 1 tablet three times per day.  Her sleep schedule was very disrupted. Sleeping from 3 pm or so until 930 pm then goes back to bed around 11 and wakes up at 2 am and stays up for the day. I recommended trying magnesium and melatonin at night and continuing sinemet dosing.     5/3/24:  She feels like things have been \"so-so.\" She fell last thursday after foot stuck to wet floor. Feels like her feet are freezing more often. She recently had a fib diagnosed and is on eliquis per dr. Marquez. Sleep has been better. Now sleeping 8p-3a,napping for a few half hour segments during the day. Gets up out of bed at about 7.     MOTOR SYMPTOMS:  Balance difficulty Yes- using rollator most of the time.   Falls - yes, as above.   Exercise/PT No -- none right now. Cant get to PT at the [facility].   Help with ADLs Yes- needs some help getting socks on, showering. Some difficulty getting dressed but can do it. Lives alone. Issues with cooking/standing long periods of time because of her back. Does laundry and dishes.     NON MOTOR SYMPTOMS  Orthostatic lightheadedness No  Cognitive Yes - sometimes feels her memory is a little \"detained\" like an etch a sketch. Has some word finding.  Depression/apathy Yes - had  for her dad last week. Downsizing on her place.   Anxiety No  Insomnia/RBD No   Fatigue Yes   Sialorrhea Yes  Hypophonia Yes  Dysphagia -no  Constipation - sometimes  Urinary dysfunction Yes- some daytime and night time incontinence       Meds:   Current: sinemet 25/100 1 tab TID -- 7, 1, 5  Time to turn on: \"a few " "hours\"  Wearing off: cannot tell   SE: Denies hallucinations, dyskinesia, impulse control, sudden sleep, edemaHas some sleepiness late afternoon. Does not sleep very well all night long and then taking very long naps.   Previously tried: ropinirole caused itchiness       Review of Systems  10 point review of systems performed and is negative except as noted in the HPI.     All other systems have been reviewed and are negative for complaint.    Past Medical History:   Diagnosis Date    Acute bronchitis due to other specified organisms 01/15/2020    Acute bronchitis due to other specified organisms    Acute maxillary sinusitis, unspecified 10/10/2017    Acute non-recurrent maxillary sinusitis    Bitten or stung by nonvenomous insect and other nonvenomous arthropods, initial encounter 07/12/2016    Bedbug bite, initial encounter    Cervicalgia 02/28/2023    Closed compression fracture of thoracic vertebra (Multi) 02/28/2023    Encounter for screening for cardiovascular disorders 04/11/2016    Encounter for screening for cardiovascular disorders    Encounter for screening for diabetes mellitus 04/11/2016    Encounter for screening for diabetes mellitus    Encounter for screening for lipoid disorders 04/11/2016    Encounter for screening for lipoid disorders    Encounter for screening for osteoporosis 03/30/2021    Osteoporosis screening    Enteroviral vesicular pharyngitis 08/19/2015    Herpangina    Facial droop 09/07/2023    Gastroduodenitis 09/07/2023    Impacted cerumen, left ear 06/19/2018    Impacted cerumen of left ear    Impacted cerumen, right ear 08/29/2018    Impacted cerumen of right ear    Localized enlarged lymph nodes 10/30/2019    Cervical lymphadenopathy    Localized enlarged lymph nodes 11/30/2016    Lymphadenopathy, inguinal    Localized swelling, mass and lump, head 09/23/2019    Lump of scalp    Oral mucositis (ulcerative), unspecified 09/07/2023    Osteoarthritis 02/28/2023    Otalgia, right ear " 01/11/2016    Right ear pain    Other acute sinusitis 01/15/2020    Other acute sinusitis, recurrence not specified    Other fecal abnormalities 11/15/2018    Positive FIT (fecal immunochemical test)    Other forms of stomatitis 01/25/2017    Oral ulcer    Other malaise 11/20/2017    Malaise and fatigue    Other specified disorders of eustachian tube, bilateral 02/23/2018    Dysfunction of both eustachian tubes    Other specified disorders of eustachian tube, right ear 01/11/2016    Dysfunction of right eustachian tube    Personal history of diseases of the skin and subcutaneous tissue 01/06/2017    History of impetigo    Personal history of other diseases of the circulatory system     History of hypertension    Personal history of other diseases of the digestive system 11/18/2015    History of acute gastritis    Personal history of other diseases of the respiratory system 08/25/2014    Personal history of acute sinusitis    Personal history of other diseases of the respiratory system 02/23/2018    History of acute bronchitis    Personal history of other diseases of the respiratory system 10/31/2016    History of acute sinusitis    Personal history of other diseases of the respiratory system 12/16/2013    History of acute bronchitis    Personal history of other infectious and parasitic diseases 01/25/2017    History of candidiasis of mouth    Personal history of other specified conditions 06/19/2018    History of dizziness    Personal history of other specified conditions 12/28/2015    History of fatigue    Personal history of other specified conditions 11/12/2021    History of weight loss    Personal history of other specified conditions 01/02/2019    History of urinary frequency    Personal history of other specified conditions 10/12/2022    History of abnormal weight loss    Personal history of other specified conditions     History of painful urination    Personal history of other specified conditions 03/26/2015     History of shortness of breath    Radiculopathy, cervical region 10/10/2017    Cervical radiculopathy    Segmental and somatic dysfunction of cervical region 05/03/2017    Cervical somatic dysfunction    Spondylosis without myelopathy or radiculopathy, cervical region 09/19/2017    Cervical spondylosis    Sprain of ligaments of lumbar spine, initial encounter 02/23/2018    Lumbar sprain, initial encounter    Unspecified hearing loss, right ear 08/29/2018    Hearing loss, right    Urinary tract infection, site not specified 09/13/2021    Acute lower UTI       No relevant family history has been documented for this patient.    Past Surgical History:   Procedure Laterality Date    BREAST LUMPECTOMY  09/25/2013    Breast Surgery Lumpectomy    COLONOSCOPY  03/26/2015    Complete Colonoscopy    ESOPHAGOGASTRODUODENOSCOPY  03/16/2016    Diagnostic Esophagogastroduodenoscopy    KNEE ARTHROSCOPY W/ DEBRIDEMENT  09/25/2013    Knee Arthroscopy (Therapeutic)    MR HEAD ANGIO WO IV CONTRAST  8/9/2023    MR HEAD ANGIO WO IV CONTRAST 8/9/2023 [facility]    MR NECK ANGIO WO IV CONTRAST  8/9/2023    MR NECK ANGIO WO IV CONTRAST 8/9/2023 [facility]    OTHER SURGICAL HISTORY  09/25/2013    Breast Surgery Enlargement Procedure Bilateral    OTHER SURGICAL HISTORY  09/25/2013    Breast Surgery Removal Of Mammary Implant Bilateral    OTHER SURGICAL HISTORY  04/18/2022    Cataract surgery    OTHER SURGICAL HISTORY  04/18/2022    Turbinate reduction    OTHER SURGICAL HISTORY  04/18/2022    [place] tooth extraction    OTHER SURGICAL HISTORY  11/20/2019    Cyst excision    TOTAL ABDOMINAL HYSTERECTOMY  09/25/2013    Total Abdominal Hysterectomy    TOTAL KNEE ARTHROPLASTY Right 05/22/2023    Dr. Potter       Social History     Substance and Sexual Activity   Drug Use Never     Tobacco Use: Low Risk  (5/3/2024)    Patient History     Smoking Tobacco Use: Never     Smokeless Tobacco Use: Never     Passive Exposure: Not on file     Alcohol Use:  Not At Risk (1/11/2024)    AUDIT-C     Frequency of Alcohol Consumption: Never     Average Number of Drinks: Patient does not drink     Frequency of Binge Drinking: Never           Objective     Neurological Exam     Alert and oriented to person, place, time, and situation.   Facial muscles are symmetric with significant masked facies.  Hypophonic, psychomotor slowing.   Ocular versions intact.   Speech fluent to history and with mild hypophonia.   Cogwheel rigidity in BUE and BLE, worse on left.  Bradykinetic in BUE and BLE for finger tapping and toe tapping, slightly worse in LUE.  No tremors or dyskinesia.  Stooped posture, slow unsteady gait, significant shuffling. Retropulsion.       Last pill 2 hrs before exam     Results for orders placed or performed in visit on 09/13/21   XR LUMBAR SPINE COMPLETE 4+ VIEWS    Narrative    MRN: [id number]  Patient Name: ANDREI LUDWIG     STUDY:  SPINE, LUMBOSACRAL  MIN 4 VIEWS; ;  9/13/2021 12:07 pm     INDICATION:  Low back pain.     COMPARISON:  February 5, 2016 radiographs     ACCESSION NUMBER(S):  [id number]     ORDERING CLINICIAN:  HALEY DAVILA     FINDINGS:  There is leftward curvature lumbar spine and grade 1 L3  anterolisthesis mildly progressed. Mild-to-moderate multilevel  spondylosis and degenerative disc changes are progressed, most  pronounced L4. No detected fracture, or suspicious osseous lesion. No  detected spondylolysis. Mild scattered arterial vascular  calcifications.     IMPRESSION:  Progressive lumbar malalignment, spondylosis and degenerative disc  changes.     Scattered arterial vascular calcifications.      Results for orders placed or performed in visit on 06/22/21   DEXA BONE DENSITY AXIAL SKELETON W VFA    Narrative    =================================================================            Bone Density and Vertebral Assessment Report             =================================================================     Height:           64.5  in  Weight:           124.0 lb  Fractures:  Treatments:     -----------------------------------------------------------------     Indication: postmenopausal osteoporosis; prior fracture;     hip or spine fracture;     Referring Provider: HALEY DAVILA     Study: Bone densitometry and vertebral deformity assessment         were performed.     Exam Date: June 22, 2021     Accession number: [id number]     Findings: Standard measurements were obtained utilizing a Dual   Energy X-ray Absorptiometry bone densitometer. Data obtained   includes planar bone density measurements over the Left Femur,   VFA, and Lumbar Spine. Comparison of measured data and   standardized mean data for a young adult population (when peak   bone mass occurs) results in a T score. This represents the   number of standard deviations above or below the mean of a   young adult population. Comparison of measured data to   standards from an age adjusted population similarly yields a Z   score.            Bone Density:  -----------------------------------------------------------------  Region                   BMD    T-score  Z-score   Classification  -----------------------------------------------------------------  AP Spine(L1-L4)          0.744   -2.8     -0.4       Osteoporosis  Femoral Neck (Left)      0.484   -3.3     -1.2       Osteoporosis  Total Hip (Left)         0.620   -2.6     -0.9       Osteoporosis  -----------------------------------------------------------------     World Health Organization criteria for BMD impression   classify patients as:   Normal (T-score at or above -1.0),   Osteopenia (T-score between -1.0 and -2.5), or   Osteoporosis (T-score at or below -2.5).      10-year Fracture Risk:  -----------------------------------------------------------------  FRAX not reported because:    Some T-score for Spine Total or Hip Total or Femoral Neck at   or below -2.5     Prior hip or vertebral  fracture  -----------------------------------------------------------------        Vertebral Deformity Assessment: Exam date 06/22/2021  -----------------------------------------------------------------  Vertebral Level                           Impression  -----------------------------------------------------------------  T4                                        Mild Biconcave   Deformity   T5                                        Normal  T6                                        Normal  T7                                        Mild Biconcave   Deformity   T8                                        Normal  T9                                        Normal  T10                                       Normal  T11                                       Normal  T12                                       Normal  L1                                        Normal  L2                                        Normal  L3                                        Normal  L4                                        Normal  -----------------------------------------------------------------  A spine fracture indicates 5X risk for subsequent spine fracture   and 2X risk for subsequent hip fracture.           Previous Exams:  -----------------------------------------------------------------  Region  Exam     Age  BMD   T-score  BMD Change     BMD Change           Date          g/cm2          vs Baseline    vs Previous  -----------------------------------------------------------------  AP Spine (L1-L4)       06/22/2021  76  0.744    -2.8      -2.8%          -2.8%            06/10/2019  74  0.765    -2.6                                    Total Hip(Left)       06/22/2021  76  0.620    -2.6      -2.5%          -2.5%            06/10/2019  74  0.636    -2.5                                 -----------------------------------------------------------------  *Denotes significance at 95% confidence level, LSC for AP Spine   = 0.022 g/cm2,  LSC for  Total Hip = 0.027 g/cm2            Impression: The patient has osteoporosis as determined by WHO   criteria. Based on the results of the patient?s bone density   assessment, the risk of future fracture increases approximately   two fold for each 1.0 SD decrease in T-score.    However, low BMD is not the only risk factor for a future   fragility fracture. Other clinical risk factors for   osteoporotic fracture should be considered in ascertaining this   patient?s future fracture risk including the patient?s age,   previous osteoporotic (fragility) fracture, estrogen   deficiency/hypogonadal, risk of falling, use of medications   implicated in bone loss (glucocorticoids), family history of   osteoporotic fracture, diseases and conditions associated with   bone loss, low body weight, smoking, high bone turnover, etc.   Combining low BMD and other clinical risk factors result in a   more precise assessment of future fracture risk. Secondary   causes for osteoporosis, such as osteomalacia, other metabolic   bone disorders, and diseases and conditions that may contribute   to accelerated bone loss may have to be considered depending on   the clinical situation.    A repeat bone density assessment should be considered in two   years.      Patient has a Mild Biconcave Deformity fracture of vertebra T4.   Patient has a Mild Biconcave Deformity fracture of vertebra T7.         All images and detailed analysis are available on the    Radiology PACS.   Reported by: Paulie on 06/22/2021 9:16:00 AM.   Results for orders placed or performed in visit on 06/23/17   MR CERVICAL SPINE WO IV CONTRAST    Narrative    MRN: [id number]  Patient Name: ANDREI LUDWIG     STUDY:  NR MRI CERVICAL WO; 6/23/2017 7:24 am     INDICATION:  Signs/Symptoms: Left sided neck pain that radiates down into left arm.     COMPARISON:  Radiographs from June 16, 2017     ACCESSION NUMBER(S):  [id number]     ORDERING CLINICIAN:  HALEY DAVILA      TECHNIQUE:  Sagittal T1, T2, axial T1 and axial T2 weighted images were acquired  through the cervical spine.     FINDINGS:  Alignment: There is straightening of the normal cervical lordosis.     Vertebrae/Intervertebral Discs: The vertebral bodies demonstrate  expected height.Degenerative endplate signal changes are demonstrated  at C5/6 and C6/7. Disc space narrowing and endplate spurring are most  pronounced at C6/7 and C5/6.     Cord: No intrinsic signal abnormality is appreciated within the  cervical cord.     C1-C2: There are degenerative changes at the atlantodental interval.  There is no central canal stenosis.     C2-C3: There is no posterior disc contour abnormality. Degenerative  facet changes and uncovertebral joint hypertrophy are noted  bilaterally. There is mild left-sided neural foraminal narrowing.     C3-C4: There is trace disc bulging but no significant central canal  stenosis. Mild facet and uncovertebral joint hypertrophy are  demonstrated. The neural foramina are patent.     C4-C5: There is a tiny central disc protrusion but no significant  central canal stenosis. There is mild uncovertebral joint hypertrophy  on the left. Bilateral degenerative facet changes are also  demonstrated. There is mild left-sided neural foraminal narrowing.     C5-C6: There is a broad-based posterior disc/osteophyte complex  partially effacing the ventral subarachnoid space. There is mild  narrowing of the central canal. Degenerative facet changes and  uncovertebral joint hypertrophy contribute to moderate neural  foraminal stenosis.     C6-C7: There is a posterior disc/osteophyte complex partially  effacing the ventral subarachnoid space. There is mild central canal  stenosis. Facet and uncovertebral joint hypertrophy contribute to  moderate left and mild right neural foraminal stenosis.     C7-T1: There is no posterior disc contour abnormality. There is no  significant central canal or neural foraminal  stenosis.     A perineural root sleeve cyst is suspected on the left at T1/2.     The prevertebral and posterior paraspinous soft tissues are within  normal limits.     IMPRESSION:  Degenerative changes of the cervical spine primarily involving C5/6  and C6/7.   Results for orders placed or performed in visit on 06/16/17   XR CERVICAL SPINE COMPLETE 4-5 VIEWS    Narrative    MRN: [id number]  Patient Name: ANDREI LUDWIG     STUDY:  Cervical spine dated 6/16/2017.     INDICATION:  Left-sided neck pain that radiates down into left arm. No trauma.     COMPARISON:  None.     ACCESSION NUMBER(S):  [id number]     ORDERING CLINICIAN:  HALEY DAVILA     TECHNIQUE:  AP, lateral, bilateral oblique, odontoid, and Fuchs radiographs of  the cervical spine.     FINDINGS:  The cervical spine is seen to C7/T1. There is straightening of the  cervical lordosis. There is grade 1 anterolisthesis of C4 on C5.  vertebral body height and alignment are otherwise maintained. No  fracture or dislocation is evident.The dens and lateral masses are  intact.There is moderate C5-6 and C6-7 degenerative change with  bilateral neural foraminal narrowing. Mild degenerative changes seen  at other levels of the cervical spine.No prevertebral soft tissue  swelling is evident.Associated soft tissues are grossly unremarkable.     IMPRESSION:  1. Grade 1 anterolisthesis of C4 on C5. Straightening of the cervical  lordosis which could relate to patient positioning and/or muscle  spasm.  2. Degenerative changes discussed above.   Results for orders placed or performed in visit on 04/06/13   XR THORACIC SPINE 3 VIEWS    Narrative    EXAMINATION:      Thoracic spine     HISTORY:       PAIN     FINDINGS:      Two views of the thoracic spine are obtained. No comparison studies   available.     Alignment is intact. The vertebral body heights and the disc heights   are preserved. Apparent osteopenia. Very minimal endplate sclerosis   and osteophytes involving the  upper thoracic spine.                 IMPRESSION:      Very minimal discogenic degenerative changes of the upper thoracic   spine and mild osteopenia. Otherwise essentially negative thoracic   spine.     This report was dictated at [facility].              Assessment/Plan   Problem List Items Addressed This Visit       Parkinson's disease without dyskinesia, with fluctuating manifestations (Multi) - Primary    Overview     On exam noted to have bradykinesia, rigidity, tremor worse on the left side along with gait and balance disturbance and writing changes.   Started on CD-LD 25/100 TID in the hospital by Dr. Ro          Current Assessment & Plan     Sx appear to be worse including hypophonia, masked facies, shuffling gait, freezing. She is up very early in the morning. We will increase to 4 times per day dosing, 1 tab at 5a, 9a, 1p, 5p.     Will try to arrange in home PT.          Relevant Medications    carbidopa-levodopa (Sinemet)  mg tablet    Other Relevant Orders    Referral to Home Health     Other Visit Diagnoses       Parkinson's disease, unspecified whether dyskinesia present, unspecified whether manifestations fluctuate (Multi)        Relevant Medications    carbidopa-levodopa (Sinemet)  mg tablet

## 2024-05-03 NOTE — ASSESSMENT & PLAN NOTE
Sx appear to be worse including hypophonia, masked facies, shuffling gait, freezing. She is up very early in the morning. We will increase to 4 times per day dosing, 1 tab at 5a, 9a, 1p, 5p.     Will try to arrange in home PT.

## 2024-05-05 ENCOUNTER — HOME CARE VISIT (OUTPATIENT)
Dept: HOME HEALTH SERVICES | Facility: HOME HEALTH | Age: 79
End: 2024-05-05
Payer: MEDICARE

## 2024-05-05 VITALS
DIASTOLIC BLOOD PRESSURE: 80 MMHG | HEART RATE: 96 BPM | TEMPERATURE: 98.7 F | SYSTOLIC BLOOD PRESSURE: 125 MMHG | OXYGEN SATURATION: 97 %

## 2024-05-05 PROCEDURE — 169592 NO-PAY CLAIM PROCEDURE

## 2024-05-05 PROCEDURE — 1090000002 HH PPS REVENUE DEBIT

## 2024-05-05 PROCEDURE — G0151 HHCP-SERV OF PT,EA 15 MIN: HCPCS | Mod: HHH

## 2024-05-05 PROCEDURE — 1090000001 HH PPS REVENUE CREDIT

## 2024-05-05 PROCEDURE — 0023 HH SOC

## 2024-05-05 ASSESSMENT — ENCOUNTER SYMPTOMS
PAIN LOCATION - PAIN SEVERITY: 0/10
PAIN: 1
PERSON REPORTING PAIN: PATIENT
PAIN LOCATION: BACK
HIGHEST PAIN SEVERITY IN PAST 24 HOURS: 7/10

## 2024-05-06 ENCOUNTER — HOME CARE VISIT (OUTPATIENT)
Dept: HOME HEALTH SERVICES | Facility: HOME HEALTH | Age: 79
End: 2024-05-06
Payer: MEDICARE

## 2024-05-06 PROCEDURE — 1090000001 HH PPS REVENUE CREDIT

## 2024-05-06 PROCEDURE — 1090000002 HH PPS REVENUE DEBIT

## 2024-05-06 ASSESSMENT — ACTIVITIES OF DAILY LIVING (ADL)
OASIS_M1830: 03
ENTERING_EXITING_HOME: NEEDS ASSISTANCE

## 2024-05-06 NOTE — OP NOTE
PREOPERATIVE DIAGNOSIS:  Osteoarthritis, right knee with valgus deformity.    POSTOPERATIVE DIAGNOSIS:  Osteoarthritis, right knee with valgus deformity.    OPERATION/PROCEDURE:  Right total knee replacement (DePuy, PFC-PS, cemented, MIS approach).    SURGEON:  Willie Potter DO.    ASSISTANT(S):  Alcides Murillo DO; and KYLAH Calles.    ANESTHESIA:  General with adductor canal block.    PATHOLOGY:  This 78-year-old female complained of pain in the right knee which  became debilitating.  She had failed conservative measures for number  of years.  She had positive radiographs to confirm advanced  osteoarthritis in the right knee with valgus deformity.  She elected  to proceed with total knee replacement.  At the time of surgery, she  was found have advanced osteoarthritis in the right knee with valgus  deformity noted.     DESCRIPTION OF PROCEDURE:  The patient was brought to operative suite.  Satisfactory general and  adductor canal block anesthetic was administered by Department of  Anesthesia.  The right lower extremity was sterilely draped and  prepped free in routine surgical fashion.  A tourniquet was applied  in the upper 1/3 of the patient's right thigh.  The right lower  extremity was exsanguinated free of blood.  Tourniquet was inflated  to 300 mmHg.  Landmarks were identified.  Incision was carried out  over the anteromedial aspect of the patient's right knee extending  approximately 14 cm in length.  Dissection carried down to the  extensor mechanism where a trivector quad sparing incision was  performed.  Patella was everted 90 degrees, fat pad was excised.  Patella was measured, found to be 24 mm thick.  The arthritic  articular cartilage was resected from the patella using a sagittal  saw.  Drill holes were placed in the cut surface of the patella.  A  trial size 32 patella was placed.  The height of the patella was  measured with the trial in place and found to be 24 mm thick.   A  protective plate was placed on the cut surface of the patella, and  the knee was flexed.  Osteophytes removed with rongeur.  ACL and PCL  were sacrificed along with the medial and lateral meniscus.  A   hole was placed in the distal femur for intramedullary  instrumentation.  Distal femoral cut was performed using a sagittal  saw.  Femur was sized and found to be a size 3.  Size 3 cutting block  was pinned to the distal femur and appropriate cuts were carried out  using a sagittal saw.  A reciprocating saw was used to create the  notch cut for the posterior stabilized implant.  Attention was then  directed to the tibia, where a  hole was created in the proximal  tibia for intramedullary instrumentation.  Cutting block assembly was  applied over the guide venessa and approximately 1 cm of proximal tibia  was resected using a sagittal saw.  The tibia was sized and found to  be a size 3.  Posterior osteophytes removed from the femur at this  time using a curved osteotome.  Trial implants were inserted using a  size 3 femur, size 3 tibia, 8 mm spacer, and a 32 patella.  There was  found to be full extension, flexion beyond 120 degrees, good  collateral stability and satisfactory tracking of the patellofemoral  joint.  Trial components were removed.  The proximal tibia was  finished using a drill and broach.  Bony ends were lavaged and dried.   Bone cement was mixed.  A size 3 standard tibial base plate was  cemented into position.  A size 3 posterior stabilized femoral  component was cemented into position.  A size 32 all-polyethylene  patella was cemented into position.  Excess bone cement was removed.  A size 8 standard polyethylene was secured to the tibial base plate.  The knee was reduced, taken through a full range of motion, and found  to have excellent motion and stability.  Copious lavage was carried  out.  Tourniquet released.  Good blood flow was noted to return to  the patient's right lower  extremity.  Hemostasis was obtained by the  use of electrocautery and Aquamantys anticoagulation device.  Closure  was performed using #1 undyed Vicryl suture and a #2 FiberWire suture  and a barbed #2 suture for the deep fascia.  Watertight closure was  accomplished.  Subcutaneous tissues were approximated using 2-0  undyed Vicryl and skin edges closed using a 3-0 V-Loc followed by  Steri-Strips, Dermabond, and a Mepilex silver dressing.  The patient  was transferred to recovery room having tolerated the procedure well.       Willie Potter DO    DD:  05/22/2023 15:45:16 EST  DT:  05/23/2023 02:40:44 EST  DICTATION NUMBER:  [id number]  INTERNAL JOB NUMBER:  [id number]    CC:  Willie Potter DO, Fax: [phone]        Electronic Signatures:  Willie Potter) (Signed on 23-May-2023 07:19)   Authored  Unsigned, Draft (SYS GENERATED) (Entered on 23-May-2023 02:40)   Entered    Last Updated: 23-May-2023 07:19 by Willie Potter)

## 2024-05-07 ENCOUNTER — HOME CARE VISIT (OUTPATIENT)
Dept: HOME HEALTH SERVICES | Facility: HOME HEALTH | Age: 79
End: 2024-05-07
Payer: MEDICARE

## 2024-05-07 VITALS
TEMPERATURE: 97.4 F | RESPIRATION RATE: 18 BRPM | HEART RATE: 94 BPM | DIASTOLIC BLOOD PRESSURE: 68 MMHG | SYSTOLIC BLOOD PRESSURE: 124 MMHG | OXYGEN SATURATION: 97 %

## 2024-05-07 PROCEDURE — 1090000002 HH PPS REVENUE DEBIT

## 2024-05-07 PROCEDURE — 1090000001 HH PPS REVENUE CREDIT

## 2024-05-07 PROCEDURE — G0157 HHC PT ASSISTANT EA 15: HCPCS | Mod: CQ,HHH

## 2024-05-08 PROCEDURE — 1090000002 HH PPS REVENUE DEBIT

## 2024-05-08 PROCEDURE — 1090000001 HH PPS REVENUE CREDIT

## 2024-05-09 PROCEDURE — 1090000002 HH PPS REVENUE DEBIT

## 2024-05-09 PROCEDURE — 1090000001 HH PPS REVENUE CREDIT

## 2024-05-10 ENCOUNTER — HOME CARE VISIT (OUTPATIENT)
Dept: HOME HEALTH SERVICES | Facility: HOME HEALTH | Age: 79
End: 2024-05-10
Payer: MEDICARE

## 2024-05-10 VITALS
HEART RATE: 96 BPM | SYSTOLIC BLOOD PRESSURE: 132 MMHG | RESPIRATION RATE: 18 BRPM | DIASTOLIC BLOOD PRESSURE: 82 MMHG | TEMPERATURE: 96.9 F | OXYGEN SATURATION: 97 %

## 2024-05-10 PROCEDURE — G0157 HHC PT ASSISTANT EA 15: HCPCS | Mod: CQ,HHH

## 2024-05-10 PROCEDURE — 1090000002 HH PPS REVENUE DEBIT

## 2024-05-10 PROCEDURE — 1090000001 HH PPS REVENUE CREDIT

## 2024-05-10 ASSESSMENT — ENCOUNTER SYMPTOMS
DENIES PAIN: 1
PERSON REPORTING PAIN: PATIENT

## 2024-05-11 PROCEDURE — 1090000001 HH PPS REVENUE CREDIT

## 2024-05-11 PROCEDURE — 1090000002 HH PPS REVENUE DEBIT

## 2024-05-12 PROCEDURE — 1090000001 HH PPS REVENUE CREDIT

## 2024-05-12 PROCEDURE — 1090000002 HH PPS REVENUE DEBIT

## 2024-05-13 PROCEDURE — 1090000002 HH PPS REVENUE DEBIT

## 2024-05-13 PROCEDURE — G0180 MD CERTIFICATION HHA PATIENT: HCPCS | Performed by: FAMILY MEDICINE

## 2024-05-13 PROCEDURE — 1090000001 HH PPS REVENUE CREDIT

## 2024-05-14 PROCEDURE — 1090000002 HH PPS REVENUE DEBIT

## 2024-05-14 PROCEDURE — 1090000001 HH PPS REVENUE CREDIT

## 2024-05-15 ENCOUNTER — HOME CARE VISIT (OUTPATIENT)
Dept: HOME HEALTH SERVICES | Facility: HOME HEALTH | Age: 79
End: 2024-05-15
Payer: MEDICARE

## 2024-05-15 PROCEDURE — 1090000001 HH PPS REVENUE CREDIT

## 2024-05-15 PROCEDURE — 1090000002 HH PPS REVENUE DEBIT

## 2024-05-16 PROCEDURE — 1090000001 HH PPS REVENUE CREDIT

## 2024-05-16 PROCEDURE — 1090000002 HH PPS REVENUE DEBIT

## 2024-05-17 ENCOUNTER — HOME CARE VISIT (OUTPATIENT)
Dept: HOME HEALTH SERVICES | Facility: HOME HEALTH | Age: 79
End: 2024-05-17
Payer: MEDICARE

## 2024-05-17 VITALS
TEMPERATURE: 97.4 F | SYSTOLIC BLOOD PRESSURE: 126 MMHG | OXYGEN SATURATION: 98 % | RESPIRATION RATE: 18 BRPM | HEART RATE: 88 BPM | DIASTOLIC BLOOD PRESSURE: 78 MMHG

## 2024-05-17 PROCEDURE — G0157 HHC PT ASSISTANT EA 15: HCPCS | Mod: CQ,HHH

## 2024-05-17 PROCEDURE — 1090000002 HH PPS REVENUE DEBIT

## 2024-05-17 PROCEDURE — 1090000001 HH PPS REVENUE CREDIT

## 2024-05-17 ASSESSMENT — ENCOUNTER SYMPTOMS
PERSON REPORTING PAIN: PATIENT
DENIES PAIN: 1

## 2024-05-18 PROCEDURE — 1090000002 HH PPS REVENUE DEBIT

## 2024-05-18 PROCEDURE — 1090000001 HH PPS REVENUE CREDIT

## 2024-05-19 PROCEDURE — 1090000002 HH PPS REVENUE DEBIT

## 2024-05-19 PROCEDURE — 1090000001 HH PPS REVENUE CREDIT

## 2024-05-20 ENCOUNTER — HOME CARE VISIT (OUTPATIENT)
Dept: HOME HEALTH SERVICES | Facility: HOME HEALTH | Age: 79
End: 2024-05-20
Payer: MEDICARE

## 2024-05-20 VITALS
DIASTOLIC BLOOD PRESSURE: 80 MMHG | RESPIRATION RATE: 18 BRPM | HEART RATE: 74 BPM | OXYGEN SATURATION: 97 % | TEMPERATURE: 97.3 F | SYSTOLIC BLOOD PRESSURE: 128 MMHG

## 2024-05-20 DIAGNOSIS — G20.A2 PARKINSON'S DISEASE WITHOUT DYSKINESIA, WITH FLUCTUATING MANIFESTATIONS (MULTI): Primary | ICD-10-CM

## 2024-05-20 PROCEDURE — 1090000001 HH PPS REVENUE CREDIT

## 2024-05-20 PROCEDURE — 1090000002 HH PPS REVENUE DEBIT

## 2024-05-20 PROCEDURE — G0157 HHC PT ASSISTANT EA 15: HCPCS | Mod: CQ,HHH

## 2024-05-20 ASSESSMENT — ENCOUNTER SYMPTOMS
PERSON REPORTING PAIN: PATIENT
DENIES PAIN: 1

## 2024-05-21 PROCEDURE — 1090000001 HH PPS REVENUE CREDIT

## 2024-05-21 PROCEDURE — 1090000002 HH PPS REVENUE DEBIT

## 2024-05-22 ENCOUNTER — HOME CARE VISIT (OUTPATIENT)
Dept: HOME HEALTH SERVICES | Facility: HOME HEALTH | Age: 79
End: 2024-05-22
Payer: MEDICARE

## 2024-05-22 VITALS
OXYGEN SATURATION: 96 % | SYSTOLIC BLOOD PRESSURE: 118 MMHG | DIASTOLIC BLOOD PRESSURE: 70 MMHG | HEART RATE: 92 BPM | RESPIRATION RATE: 18 BRPM | TEMPERATURE: 97.2 F

## 2024-05-22 PROCEDURE — G0157 HHC PT ASSISTANT EA 15: HCPCS | Mod: CQ,HHH

## 2024-05-22 PROCEDURE — 1090000002 HH PPS REVENUE DEBIT

## 2024-05-22 PROCEDURE — 1090000001 HH PPS REVENUE CREDIT

## 2024-05-22 ASSESSMENT — ENCOUNTER SYMPTOMS
PAIN LOCATION - PAIN FREQUENCY: INTERMITTENT
PAIN LOCATION: BACK
PAIN SEVERITY GOAL: 0/10
PAIN LOCATION - PAIN DURATION: INTERMITTENT
PAIN LOCATION - PAIN SEVERITY: 4/10
PAIN LOCATION - PAIN QUALITY: SHARP
PAIN LOCATION - RELIEVING FACTORS: REST, MEDICATION
PERSON REPORTING PAIN: PATIENT
PAIN LOCATION - EXACERBATING FACTORS: ACTIVITY
PAIN: 1
HIGHEST PAIN SEVERITY IN PAST 24 HOURS: 4/10
LOWEST PAIN SEVERITY IN PAST 24 HOURS: 0/10
SUBJECTIVE PAIN PROGRESSION: UNCHANGED

## 2024-05-23 PROCEDURE — 1090000002 HH PPS REVENUE DEBIT

## 2024-05-23 PROCEDURE — 1090000001 HH PPS REVENUE CREDIT

## 2024-05-24 PROCEDURE — 1090000001 HH PPS REVENUE CREDIT

## 2024-05-24 PROCEDURE — 1090000002 HH PPS REVENUE DEBIT

## 2024-05-25 PROCEDURE — 1090000002 HH PPS REVENUE DEBIT

## 2024-05-25 PROCEDURE — 1090000001 HH PPS REVENUE CREDIT

## 2024-05-26 PROCEDURE — 1090000002 HH PPS REVENUE DEBIT

## 2024-05-26 PROCEDURE — 1090000001 HH PPS REVENUE CREDIT

## 2024-05-27 PROCEDURE — 1090000001 HH PPS REVENUE CREDIT

## 2024-05-27 PROCEDURE — 1090000002 HH PPS REVENUE DEBIT

## 2024-05-28 PROCEDURE — 1090000001 HH PPS REVENUE CREDIT

## 2024-05-28 PROCEDURE — 1090000002 HH PPS REVENUE DEBIT

## 2024-05-29 ENCOUNTER — HOME CARE VISIT (OUTPATIENT)
Dept: HOME HEALTH SERVICES | Facility: HOME HEALTH | Age: 79
End: 2024-05-29
Payer: MEDICARE

## 2024-05-29 VITALS
TEMPERATURE: 97.2 F | OXYGEN SATURATION: 99 % | HEART RATE: 74 BPM | DIASTOLIC BLOOD PRESSURE: 72 MMHG | RESPIRATION RATE: 18 BRPM | SYSTOLIC BLOOD PRESSURE: 124 MMHG

## 2024-05-29 DIAGNOSIS — I10 BENIGN ESSENTIAL HYPERTENSION: ICD-10-CM

## 2024-05-29 PROCEDURE — 1090000001 HH PPS REVENUE CREDIT

## 2024-05-29 PROCEDURE — G0157 HHC PT ASSISTANT EA 15: HCPCS | Mod: CQ,HHH

## 2024-05-29 PROCEDURE — 1090000002 HH PPS REVENUE DEBIT

## 2024-05-29 RX ORDER — AMLODIPINE BESYLATE 2.5 MG/1
2.5 TABLET ORAL DAILY
Qty: 90 TABLET | Refills: 1 | Status: SHIPPED | OUTPATIENT
Start: 2024-05-29

## 2024-05-29 ASSESSMENT — ENCOUNTER SYMPTOMS
LOWEST PAIN SEVERITY IN PAST 24 HOURS: 0/10
PAIN LOCATION - PAIN SEVERITY: 3/10
PAIN LOCATION - RELIEVING FACTORS: REST
PAIN: 1
PAIN LOCATION - PAIN DURATION: INTERMITTENT
PAIN LOCATION - PAIN QUALITY: ACHE
PAIN LOCATION - PAIN FREQUENCY: INTERMITTENT
HIGHEST PAIN SEVERITY IN PAST 24 HOURS: 5/10
PAIN LOCATION - EXACERBATING FACTORS: ACTIVITY
PAIN LOCATION: RIGHT KNEE
SUBJECTIVE PAIN PROGRESSION: WAXING AND WANING
PAIN SEVERITY GOAL: 0/10
PERSON REPORTING PAIN: PATIENT

## 2024-05-30 PROCEDURE — 1090000002 HH PPS REVENUE DEBIT

## 2024-05-30 PROCEDURE — 1090000001 HH PPS REVENUE CREDIT

## 2024-05-31 ENCOUNTER — HOME CARE VISIT (OUTPATIENT)
Dept: HOME HEALTH SERVICES | Facility: HOME HEALTH | Age: 79
End: 2024-05-31
Payer: MEDICARE

## 2024-05-31 PROCEDURE — 1090000001 HH PPS REVENUE CREDIT

## 2024-05-31 PROCEDURE — G0151 HHCP-SERV OF PT,EA 15 MIN: HCPCS | Mod: HHH

## 2024-05-31 PROCEDURE — 1090000002 HH PPS REVENUE DEBIT

## 2024-06-01 VITALS — HEART RATE: 86 BPM | TEMPERATURE: 97.3 F | OXYGEN SATURATION: 99 %

## 2024-06-01 PROCEDURE — 1090000001 HH PPS REVENUE CREDIT

## 2024-06-01 PROCEDURE — 1090000002 HH PPS REVENUE DEBIT

## 2024-06-01 ASSESSMENT — ENCOUNTER SYMPTOMS
PERSON REPORTING PAIN: PATIENT
DENIES PAIN: 1

## 2024-06-01 ASSESSMENT — ACTIVITIES OF DAILY LIVING (ADL)
OASIS_M1830: 00
HOME_HEALTH_OASIS: 00

## 2024-06-02 PROCEDURE — 1090000002 HH PPS REVENUE DEBIT

## 2024-06-02 PROCEDURE — 1090000001 HH PPS REVENUE CREDIT

## 2024-07-11 ENCOUNTER — APPOINTMENT (OUTPATIENT)
Dept: PRIMARY CARE | Facility: CLINIC | Age: 79
End: 2024-07-11
Payer: MEDICARE

## 2024-07-11 VITALS
SYSTOLIC BLOOD PRESSURE: 156 MMHG | WEIGHT: 111 LBS | BODY MASS INDEX: 19.05 KG/M2 | HEART RATE: 97 BPM | OXYGEN SATURATION: 100 % | DIASTOLIC BLOOD PRESSURE: 98 MMHG

## 2024-07-11 DIAGNOSIS — E78.2 MIXED HYPERLIPIDEMIA: ICD-10-CM

## 2024-07-11 DIAGNOSIS — H10.11 ALLERGIC CONJUNCTIVITIS OF RIGHT EYE: ICD-10-CM

## 2024-07-11 DIAGNOSIS — F33.1 MDD (MAJOR DEPRESSIVE DISORDER), RECURRENT EPISODE, MODERATE (MULTI): ICD-10-CM

## 2024-07-11 DIAGNOSIS — H61.21 HEARING LOSS OF RIGHT EAR DUE TO CERUMEN IMPACTION: ICD-10-CM

## 2024-07-11 DIAGNOSIS — I73.9 PERIPHERAL VASCULAR DISEASE (CMS-HCC): ICD-10-CM

## 2024-07-11 DIAGNOSIS — G20.A2 PARKINSON'S DISEASE WITHOUT DYSKINESIA, WITH FLUCTUATING MANIFESTATIONS (MULTI): ICD-10-CM

## 2024-07-11 DIAGNOSIS — I10 BENIGN ESSENTIAL HYPERTENSION: Primary | ICD-10-CM

## 2024-07-11 DIAGNOSIS — J30.1 NON-SEASONAL ALLERGIC RHINITIS DUE TO POLLEN: ICD-10-CM

## 2024-07-11 PROCEDURE — 1159F MED LIST DOCD IN RCRD: CPT | Performed by: FAMILY MEDICINE

## 2024-07-11 PROCEDURE — 99214 OFFICE O/P EST MOD 30 MIN: CPT | Performed by: FAMILY MEDICINE

## 2024-07-11 PROCEDURE — 69209 REMOVE IMPACTED EAR WAX UNI: CPT | Performed by: FAMILY MEDICINE

## 2024-07-11 PROCEDURE — 3080F DIAST BP >= 90 MM HG: CPT | Performed by: FAMILY MEDICINE

## 2024-07-11 PROCEDURE — 3077F SYST BP >= 140 MM HG: CPT | Performed by: FAMILY MEDICINE

## 2024-07-11 PROCEDURE — 1036F TOBACCO NON-USER: CPT | Performed by: FAMILY MEDICINE

## 2024-07-11 PROCEDURE — 1160F RVW MEDS BY RX/DR IN RCRD: CPT | Performed by: FAMILY MEDICINE

## 2024-07-11 RX ORDER — CETIRIZINE HYDROCHLORIDE 10 MG/1
10 TABLET ORAL DAILY
Qty: 90 TABLET | Refills: 3 | Status: SHIPPED | OUTPATIENT
Start: 2024-07-11 | End: 2025-07-11

## 2024-07-11 RX ORDER — PRAMIPEXOLE DIHYDROCHLORIDE 0.5 MG/1
0.5 TABLET ORAL 3 TIMES DAILY
Qty: 90 TABLET | Refills: 11 | Status: SHIPPED | OUTPATIENT
Start: 2024-07-11 | End: 2025-07-11

## 2024-07-11 RX ORDER — AMLODIPINE BESYLATE 2.5 MG/1
2.5 TABLET ORAL DAILY
Qty: 90 TABLET | Refills: 1 | Status: SHIPPED | OUTPATIENT
Start: 2024-07-11

## 2024-07-11 RX ORDER — KETOTIFEN FUMARATE 0.35 MG/ML
1 SOLUTION/ DROPS OPHTHALMIC 2 TIMES DAILY
Qty: 10 ML | Refills: 3 | Status: SHIPPED | OUTPATIENT
Start: 2024-07-11

## 2024-07-11 NOTE — PROGRESS NOTES
Subjective   Patient ID: Kaitlin Robles is a 79 y.o. female who presents for Follow-up (Pt states amlodipine messes with her stomach, eliquis affects her muscles and muscles. Sinemet made her feel like she was dying.).  HPI  Can not take the sinemet because could not move  Stomach hurts every time takes the amlodipine- even if takes it with food  No CP, palpitations  Gets some SOB  Occasional dizziness  Right eye tears a lot since March 2024- denies itching  Occasionally eyes will get red  No eye pain  No numbness  Feels weak    Ophtho- 1/24  Dentist-been awhile  Colonoscopy- N/A  JOB-  FOBT-  UA/Micro-  Mammo- N/A  DEXA-  PAP- N/A  Lung CT-  Coronary Calcium CT Score-  AAA-  EKG-  RSV  Prevnar- 12/23  Flu-  Shingrix-  Td- 8/9/23  Hep C-  Advance Directives- has them  ETOH screen- 1/11/24  AWV- 1/11/24    Current Outpatient Medications:   •  acetaminophen (TylenoL) 325 mg tablet, Take 1 tablet (325 mg) by mouth every 4 hours if needed., Disp: , Rfl:   •  ascorbic acid (Vitamin C) 1,000 mg tablet, Take 1 tablet (1,000 mg) by mouth once daily., Disp: 90 tablet, Rfl: 3  •  b complex 0.4 mg tablet, Take 1 tablet by mouth once daily., Disp: 90 tablet, Rfl: 3  •  cholecalciferol (Vitamin D3) 50 mcg (2,000 unit) capsule, Take 1 capsule (50 mcg) by mouth once daily., Disp: 90 capsule, Rfl: 3  •  diphenhydramine HCl (BENADRYL ORAL), Take 25 mg by mouth 4 times a day as needed (itching)., Disp: , Rfl:   •  Eliquis 5 mg tablet, Take 1 tablet (5 mg) by mouth 2 times a day., Disp: , Rfl:   •  fluticasone (Flonase) 50 mcg/actuation nasal spray, Administer 2 sprays into each nostril once daily., Disp: , Rfl:   •  loratadine (Claritin) 10 mg tablet, TAKE 1 TABLET BY MOUTH EVERY DAY FOR ALLERGIES, Disp: 30 tablet, Rfl: 11  •  amLODIPine (Norvasc) 2.5 mg tablet, Take 1 tablet (2.5 mg) by mouth once daily., Disp: 90 tablet, Rfl: 1  •  cetirizine (ZyrTEC) 10 mg tablet, Take 1 tablet (10 mg) by mouth once daily., Disp: 90 tablet, Rfl:  3  •  ketotifen (Zaditor) 0.025 % (0.035 %) ophthalmic solution, Administer 1 drop into the right eye 2 times a day., Disp: 10 mL, Rfl: 3  •  pramipexole (Mirapex) 0.5 mg tablet, Take 1 tablet (0.5 mg) by mouth 3 times a day., Disp: 90 tablet, Rfl: 11   Past Surgical History:   Procedure Laterality Date   • BREAST LUMPECTOMY  09/25/2013    Breast Surgery Lumpectomy   • COLONOSCOPY  03/26/2015    Complete Colonoscopy   • ESOPHAGOGASTRODUODENOSCOPY  03/16/2016    Diagnostic Esophagogastroduodenoscopy   • KNEE ARTHROSCOPY W/ DEBRIDEMENT  09/25/2013    Knee Arthroscopy (Therapeutic)   • MR HEAD ANGIO WO IV CONTRAST  8/9/2023    MR HEAD ANGIO WO IV CONTRAST 8/9/2023 [facility]   • MR NECK ANGIO WO IV CONTRAST  8/9/2023    MR NECK ANGIO WO IV CONTRAST 8/9/2023 [facility]   • OTHER SURGICAL HISTORY  09/25/2013    Breast Surgery Enlargement Procedure Bilateral   • OTHER SURGICAL HISTORY  09/25/2013    Breast Surgery Removal Of Mammary Implant Bilateral   • OTHER SURGICAL HISTORY  04/18/2022    Cataract surgery   • OTHER SURGICAL HISTORY  04/18/2022    Turbinate reduction   • OTHER SURGICAL HISTORY  04/18/2022    [place] tooth extraction   • OTHER SURGICAL HISTORY  11/20/2019    Cyst excision   • TOTAL ABDOMINAL HYSTERECTOMY  09/25/2013    Total Abdominal Hysterectomy   • TOTAL KNEE ARTHROPLASTY Right 05/22/2023    Dr. Potter      Past Medical History:   Diagnosis Date   • Acute bronchitis due to other specified organisms 01/15/2020    Acute bronchitis due to other specified organisms   • Acute maxillary sinusitis, unspecified 10/10/2017    Acute non-recurrent maxillary sinusitis   • Bitten or stung by nonvenomous insect and other nonvenomous arthropods, initial encounter 07/12/2016    Bedbug bite, initial encounter   • Cervicalgia 02/28/2023   • Closed compression fracture of thoracic vertebra (Multi) 02/28/2023   • Encounter for screening for cardiovascular disorders 04/11/2016    Encounter for screening for cardiovascular  disorders   • Encounter for screening for diabetes mellitus 04/11/2016    Encounter for screening for diabetes mellitus   • Encounter for screening for lipoid disorders 04/11/2016    Encounter for screening for lipoid disorders   • Encounter for screening for osteoporosis 03/30/2021    Osteoporosis screening   • Enteroviral vesicular pharyngitis 08/19/2015    Herpangina   • Facial droop 09/07/2023   • Gastroduodenitis 09/07/2023   • Impacted cerumen, left ear 06/19/2018    Impacted cerumen of left ear   • Impacted cerumen, right ear 08/29/2018    Impacted cerumen of right ear   • Localized enlarged lymph nodes 10/30/2019    Cervical lymphadenopathy   • Localized enlarged lymph nodes 11/30/2016    Lymphadenopathy, inguinal   • Localized swelling, mass and lump, head 09/23/2019    Lump of scalp   • Oral mucositis (ulcerative), unspecified 09/07/2023   • Osteoarthritis 02/28/2023   • Otalgia, right ear 01/11/2016    Right ear pain   • Other acute sinusitis 01/15/2020    Other acute sinusitis, recurrence not specified   • Other fecal abnormalities 11/15/2018    Positive FIT (fecal immunochemical test)   • Other forms of stomatitis 01/25/2017    Oral ulcer   • Other malaise 11/20/2017    Malaise and fatigue   • Other specified disorders of eustachian tube, bilateral 02/23/2018    Dysfunction of both eustachian tubes   • Other specified disorders of eustachian tube, right ear 01/11/2016    Dysfunction of right eustachian tube   • Personal history of diseases of the skin and subcutaneous tissue 01/06/2017    History of impetigo   • Personal history of other diseases of the circulatory system     History of hypertension   • Personal history of other diseases of the digestive system 11/18/2015    History of acute gastritis   • Personal history of other diseases of the respiratory system 08/25/2014    Personal history of acute sinusitis   • Personal history of other diseases of the respiratory system 02/23/2018    History of  acute bronchitis   • Personal history of other diseases of the respiratory system 10/31/2016    History of acute sinusitis   • Personal history of other diseases of the respiratory system 12/16/2013    History of acute bronchitis   • Personal history of other infectious and parasitic diseases 01/25/2017    History of candidiasis of mouth   • Personal history of other specified conditions 06/19/2018    History of dizziness   • Personal history of other specified conditions 12/28/2015    History of fatigue   • Personal history of other specified conditions 11/12/2021    History of weight loss   • Personal history of other specified conditions 01/02/2019    History of urinary frequency   • Personal history of other specified conditions 10/12/2022    History of abnormal weight loss   • Personal history of other specified conditions     History of painful urination   • Personal history of other specified conditions 03/26/2015    History of shortness of breath   • Radiculopathy, cervical region 10/10/2017    Cervical radiculopathy   • Segmental and somatic dysfunction of cervical region 05/03/2017    Cervical somatic dysfunction   • Spondylosis without myelopathy or radiculopathy, cervical region 09/19/2017    Cervical spondylosis   • Sprain of ligaments of lumbar spine, initial encounter 02/23/2018    Lumbar sprain, initial encounter   • Unspecified hearing loss, right ear 08/29/2018    Hearing loss, right   • Urinary tract infection, site not specified 09/13/2021    Acute lower UTI     Social History     Tobacco Use   • Smoking status: Never   • Smokeless tobacco: Never   Vaping Use   • Vaping status: Never Used   Substance Use Topics   • Alcohol use: Never   • Drug use: Never      Family History   Problem Relation Name Age of Onset   • Allergies Mother     • Heart failure Mother     • Mental illness Mother        Review of Systems    Objective   BP (!) 156/98 Comment: without meds this am  Pulse 97   Wt 50.3 kg (111  lb)   SpO2 100%   BMI 19.05 kg/m²    Physical Exam  Vitals and nursing note reviewed.   Constitutional:       Appearance: Normal appearance.   HENT:      Head: Normocephalic and atraumatic.      Right Ear: Tympanic membrane, ear canal and external ear normal. Decreased hearing noted.      Left Ear: Tympanic membrane, ear canal and external ear normal. Decreased hearing noted.      Nose: Nose normal.      Mouth/Throat:      Mouth: Mucous membranes are moist.      Pharynx: Oropharynx is clear.   Eyes:      Extraocular Movements: Extraocular movements intact.      Conjunctiva/sclera: Conjunctivae normal.      Pupils: Pupils are equal, round, and reactive to light.   Cardiovascular:      Rate and Rhythm: Normal rate and regular rhythm.      Pulses: Normal pulses.      Heart sounds: Normal heart sounds.   Pulmonary:      Effort: Pulmonary effort is normal.      Breath sounds: Normal breath sounds.   Abdominal:      General: Abdomen is flat. Bowel sounds are normal.      Palpations: Abdomen is soft.      Tenderness: There is no abdominal tenderness. There is no right CVA tenderness or left CVA tenderness.   Musculoskeletal:      Cervical back: Normal range of motion and neck supple.      Comments: TTP in left lower back with increased muscle tone   Lymphadenopathy:      Cervical: No cervical adenopathy.   Skin:     Capillary Refill: Capillary refill takes less than 2 seconds.   Neurological:      General: No focal deficit present.      Mental Status: She is alert and oriented to person, place, and time.      Gait: Gait abnormal.   Psychiatric:         Mood and Affect: Mood normal. Affect is flat.         Behavior: Behavior normal.     Ear Cerumen Removal    Date/Time: 7/11/2024 10:07 PM    Performed by: Tenzin Waldrop DO  Authorized by: Tenzin Waldrop DO    Consent:     Consent obtained:  Verbal    Consent given by:  Patient    Risks, benefits, and alternatives were discussed: yes      Risks discussed:  Bleeding,  dizziness, incomplete removal, infection, pain and TM perforation    Alternatives discussed:  Delayed treatment, alternative treatment, observation, referral and no treatment  Universal protocol:     Procedure explained and questions answered to patient or proxy's satisfaction: no      Patient identity confirmed:  Verbally with patient  Procedure details:     Location:  R ear    Procedure type: irrigation      Procedure outcomes: cerumen removed    Post-procedure details:     Inspection:  No bleeding, ear canal clear and TM intact    Hearing quality:  Improved    Procedure completion:  Tolerated well, no immediate complications      Assessment/Plan   Problem List Items Addressed This Visit       Peripheral vascular disease (CMS-HCC)    Parkinson's disease without dyskinesia, with fluctuating manifestations (Multi)    Relevant Medications    pramipexole (Mirapex) 0.5 mg tablet    MDD (major depressive disorder), recurrent episode, moderate (Multi)    Hyperlipidemia    Benign essential hypertension - Primary    Relevant Medications    amLODIPine (Norvasc) 2.5 mg tablet    Allergic rhinitis    Relevant Medications    cetirizine (ZyrTEC) 10 mg tablet     Other Visit Diagnoses       Hearing loss of right ear due to cerumen impaction        Allergic conjunctivitis of right eye        Relevant Medications    ketotifen (Zaditor) 0.025 % (0.035 %) ophthalmic solution        Renewed/continued rest of medications  Checked  labs  Updated Health Maintenance in HPI section  HTN, controlled- continue meds, low sodium diet     Malnutrition- increase number of small meals with nutrient rich foods     Hyperlipidemia- continue meds, low fat/cholesterol diet     MDD- does not feel needs meds, talk with others     Parkinson's- Mirapex, will follow up with [facility] Ruth Fib- limit caffeine, plavix     PVD- plavix, activity     FM- heat, activity safely     GERD- avoid triggers, no meds needed at this time     IBS-  fiber,  FODMAP diet    AR/allergic conjunctivitis- change to zyrtec, avoid allergens, ketotifen drops    Hearing loss- flushed ear, debrox prn, avoid q-tips     F/U 6 months    Patient understands and agrees with treatment plan    Tenzin Waldrop, DO

## 2024-08-22 ENCOUNTER — APPOINTMENT (OUTPATIENT)
Dept: PRIMARY CARE | Facility: CLINIC | Age: 79
End: 2024-08-22
Payer: MEDICARE

## 2024-08-22 VITALS
HEART RATE: 100 BPM | DIASTOLIC BLOOD PRESSURE: 94 MMHG | WEIGHT: 111 LBS | BODY MASS INDEX: 19.05 KG/M2 | OXYGEN SATURATION: 98 % | SYSTOLIC BLOOD PRESSURE: 162 MMHG

## 2024-08-22 DIAGNOSIS — I10 BENIGN ESSENTIAL HYPERTENSION: ICD-10-CM

## 2024-08-22 DIAGNOSIS — H10.31 ACUTE BACTERIAL CONJUNCTIVITIS OF RIGHT EYE: Primary | ICD-10-CM

## 2024-08-22 PROCEDURE — 3077F SYST BP >= 140 MM HG: CPT

## 2024-08-22 PROCEDURE — 99213 OFFICE O/P EST LOW 20 MIN: CPT

## 2024-08-22 PROCEDURE — 1160F RVW MEDS BY RX/DR IN RCRD: CPT

## 2024-08-22 PROCEDURE — 1159F MED LIST DOCD IN RCRD: CPT

## 2024-08-22 PROCEDURE — 1036F TOBACCO NON-USER: CPT

## 2024-08-22 PROCEDURE — 3080F DIAST BP >= 90 MM HG: CPT

## 2024-08-22 RX ORDER — TOBRAMYCIN 3 MG/ML
2 SOLUTION/ DROPS OPHTHALMIC EVERY 4 HOURS
Qty: 5 ML | Refills: 0 | Status: SHIPPED | OUTPATIENT
Start: 2024-08-22 | End: 2024-09-05

## 2024-08-22 NOTE — PATIENT INSTRUCTIONS
Start tobramycin drops in R eye  Increase amlodipine to 5mg daily   Follow up in 1 month, bring cuff

## 2024-08-22 NOTE — PROGRESS NOTES
Subjective   Patient ID: Kaitlin Robles is a 79 y.o. female who presents for Hypertension (Eye drops are not working).  HPI    HTN  -BP has not been running high   -Monday was 119/87, yesterday was 138/87  -Higher with exertion   -Took amlodipine this morning   -No headache, dizziness, chest pain     Just has knee pain     Eye drops   -not working, R eye keeps tearing  -puffy and red R eyelid  -groopy in the morning - yellowish crusting on eyelashes of R eye  -has been doing hot compresses  -eye is not itchy     Constant runny nose   No sore throat but does have PND    Past Surgical History:   Procedure Laterality Date    BREAST LUMPECTOMY  09/25/2013    Breast Surgery Lumpectomy    COLONOSCOPY  03/26/2015    Complete Colonoscopy    ESOPHAGOGASTRODUODENOSCOPY  03/16/2016    Diagnostic Esophagogastroduodenoscopy    KNEE ARTHROSCOPY W/ DEBRIDEMENT  09/25/2013    Knee Arthroscopy (Therapeutic)    MR HEAD ANGIO WO IV CONTRAST  8/9/2023    MR HEAD ANGIO WO IV CONTRAST 8/9/2023 [facility]    MR NECK ANGIO WO IV CONTRAST  8/9/2023    MR NECK ANGIO WO IV CONTRAST 8/9/2023 [facility]    OTHER SURGICAL HISTORY  09/25/2013    Breast Surgery Enlargement Procedure Bilateral    OTHER SURGICAL HISTORY  09/25/2013    Breast Surgery Removal Of Mammary Implant Bilateral    OTHER SURGICAL HISTORY  04/18/2022    Cataract surgery    OTHER SURGICAL HISTORY  04/18/2022    Turbinate reduction    OTHER SURGICAL HISTORY  04/18/2022    [place] tooth extraction    OTHER SURGICAL HISTORY  11/20/2019    Cyst excision    TOTAL ABDOMINAL HYSTERECTOMY  09/25/2013    Total Abdominal Hysterectomy    TOTAL KNEE ARTHROPLASTY Right 05/22/2023    Dr. Potter      Past Medical History:   Diagnosis Date    Acute bronchitis due to other specified organisms 01/15/2020    Acute bronchitis due to other specified organisms    Acute maxillary sinusitis, unspecified 10/10/2017    Acute non-recurrent maxillary sinusitis    Bitten or stung by nonvenomous insect and  other nonvenomous arthropods, initial encounter 07/12/2016    Bedbug bite, initial encounter    Cervicalgia 02/28/2023    Closed compression fracture of thoracic vertebra (Multi) 02/28/2023    Encounter for screening for cardiovascular disorders 04/11/2016    Encounter for screening for cardiovascular disorders    Encounter for screening for diabetes mellitus 04/11/2016    Encounter for screening for diabetes mellitus    Encounter for screening for lipoid disorders 04/11/2016    Encounter for screening for lipoid disorders    Encounter for screening for osteoporosis 03/30/2021    Osteoporosis screening    Enteroviral vesicular pharyngitis 08/19/2015    Herpangina    Facial droop 09/07/2023    Gastroduodenitis 09/07/2023    Impacted cerumen, left ear 06/19/2018    Impacted cerumen of left ear    Impacted cerumen, right ear 08/29/2018    Impacted cerumen of right ear    Localized enlarged lymph nodes 10/30/2019    Cervical lymphadenopathy    Localized enlarged lymph nodes 11/30/2016    Lymphadenopathy, inguinal    Localized swelling, mass and lump, head 09/23/2019    Lump of scalp    Oral mucositis (ulcerative), unspecified 09/07/2023    Osteoarthritis 02/28/2023    Otalgia, right ear 01/11/2016    Right ear pain    Other acute sinusitis 01/15/2020    Other acute sinusitis, recurrence not specified    Other fecal abnormalities 11/15/2018    Positive FIT (fecal immunochemical test)    Other forms of stomatitis 01/25/2017    Oral ulcer    Other malaise 11/20/2017    Malaise and fatigue    Other specified disorders of eustachian tube, bilateral 02/23/2018    Dysfunction of both eustachian tubes    Other specified disorders of eustachian tube, right ear 01/11/2016    Dysfunction of right eustachian tube    Personal history of diseases of the skin and subcutaneous tissue 01/06/2017    History of impetigo    Personal history of other diseases of the circulatory system     History of hypertension    Personal history of other  diseases of the digestive system 11/18/2015    History of acute gastritis    Personal history of other diseases of the respiratory system 08/25/2014    Personal history of acute sinusitis    Personal history of other diseases of the respiratory system 02/23/2018    History of acute bronchitis    Personal history of other diseases of the respiratory system 10/31/2016    History of acute sinusitis    Personal history of other diseases of the respiratory system 12/16/2013    History of acute bronchitis    Personal history of other infectious and parasitic diseases 01/25/2017    History of candidiasis of mouth    Personal history of other specified conditions 06/19/2018    History of dizziness    Personal history of other specified conditions 12/28/2015    History of fatigue    Personal history of other specified conditions 11/12/2021    History of weight loss    Personal history of other specified conditions 01/02/2019    History of urinary frequency    Personal history of other specified conditions 10/12/2022    History of abnormal weight loss    Personal history of other specified conditions     History of painful urination    Personal history of other specified conditions 03/26/2015    History of shortness of breath    Radiculopathy, cervical region 10/10/2017    Cervical radiculopathy    Segmental and somatic dysfunction of cervical region 05/03/2017    Cervical somatic dysfunction    Spondylosis without myelopathy or radiculopathy, cervical region 09/19/2017    Cervical spondylosis    Sprain of ligaments of lumbar spine, initial encounter 02/23/2018    Lumbar sprain, initial encounter    Unspecified hearing loss, right ear 08/29/2018    Hearing loss, right    Urinary tract infection, site not specified 09/13/2021    Acute lower UTI     Social History     Tobacco Use    Smoking status: Never    Smokeless tobacco: Never   Vaping Use    Vaping status: Never Used   Substance Use Topics    Alcohol use: Never    Drug  use: Never        Review of Systems  10 point review of systems performed and is negative except as noted in the HPI.      Current Outpatient Medications:     acetaminophen (TylenoL) 325 mg tablet, Take 1 tablet (325 mg) by mouth every 4 hours if needed., Disp: , Rfl:     amLODIPine (Norvasc) 2.5 mg tablet, Take 1 tablet (2.5 mg) by mouth once daily., Disp: 90 tablet, Rfl: 1    ascorbic acid (Vitamin C) 1,000 mg tablet, Take 1 tablet (1,000 mg) by mouth once daily., Disp: 90 tablet, Rfl: 3    b complex 0.4 mg tablet, Take 1 tablet by mouth once daily., Disp: 90 tablet, Rfl: 3    cetirizine (ZyrTEC) 10 mg tablet, Take 1 tablet (10 mg) by mouth once daily., Disp: 90 tablet, Rfl: 3    cholecalciferol (Vitamin D3) 50 mcg (2,000 unit) capsule, Take 1 capsule (50 mcg) by mouth once daily., Disp: 90 capsule, Rfl: 3    diphenhydramine HCl (BENADRYL ORAL), Take 25 mg by mouth 4 times a day as needed (itching)., Disp: , Rfl:     Eliquis 5 mg tablet, Take 1 tablet (5 mg) by mouth 2 times a day., Disp: , Rfl:     fluticasone (Flonase) 50 mcg/actuation nasal spray, Administer 2 sprays into each nostril once daily., Disp: , Rfl:     ketotifen (Zaditor) 0.025 % (0.035 %) ophthalmic solution, Administer 1 drop into the right eye 2 times a day., Disp: 10 mL, Rfl: 3    loratadine (Claritin) 10 mg tablet, TAKE 1 TABLET BY MOUTH EVERY DAY FOR ALLERGIES (Patient not taking: Reported on 8/22/2024), Disp: 30 tablet, Rfl: 11    pramipexole (Mirapex) 0.5 mg tablet, Take 1 tablet (0.5 mg) by mouth 3 times a day. (Patient not taking: Reported on 8/22/2024), Disp: 90 tablet, Rfl: 11    tobramycin (Tobrex) 0.3 % ophthalmic solution, Administer 2 drops into the right eye every 4 hours for 14 days., Disp: 5 mL, Rfl: 0     Objective   BP (!) 162/94   Pulse 100   Wt 50.3 kg (111 lb)   SpO2 98%   BMI 19.05 kg/m²     Physical Exam  Constitutional:       Appearance: Normal appearance.   HENT:      Head: Normocephalic and atraumatic.      Nose:  Rhinorrhea present.      Mouth/Throat:      Mouth: Mucous membranes are moist.   Eyes:      General:         Right eye: Discharge (tearing and some yellow drainage seen on lateral corner of eye) present. No hordeolum.         Left eye: No discharge or hordeolum.      Conjunctiva/sclera: Conjunctivae normal.      Right eye: Right conjunctiva is not injected.      Left eye: Left conjunctiva is not injected.      Comments: Mild erythema on upper and lower R eyelid.   Cardiovascular:      Rate and Rhythm: Normal rate and regular rhythm.      Heart sounds: Normal heart sounds.   Pulmonary:      Effort: Pulmonary effort is normal.      Breath sounds: Normal breath sounds. No wheezing, rhonchi or rales.   Lymphadenopathy:      Cervical: No cervical adenopathy.   Skin:     General: Skin is warm and dry.   Neurological:      Mental Status: She is alert.         Assessment/Plan   Problem List Items Addressed This Visit       Benign essential hypertension     Other Visit Diagnoses       Acute bacterial conjunctivitis of right eye    -  Primary    Relevant Medications    tobramycin (Tobrex) 0.3 % ophthalmic solution          Bacterial conjunctivitis R eye   Start tobramycin drops   Recommend she continue her allergy medication   Eye tearing has been an on-going issue since March 2024 - has tried ketotifen drops     HTN   Recommend she increase from 2.5mg to 5mg of amlodipine  BP was just as high on recheck - 162/94, 159/90  Discussed low sodium diet   Recommend she follow up in 1 month - keep BP log, bring in home BP cuff to test against office cuff   Call with questions or concerns     Case discussed with Dr. Waldrop      Discussed at visit any disease processes that were of concern as well as the risks, benefits and instructions on any new medication provided. Patient (and/or caretaker of patient if present) stated all questions were answered, and they voiced understanding of instructions.

## 2024-09-19 ENCOUNTER — APPOINTMENT (OUTPATIENT)
Dept: PRIMARY CARE | Facility: CLINIC | Age: 79
End: 2024-09-19
Payer: MEDICARE

## 2024-09-19 VITALS
OXYGEN SATURATION: 99 % | DIASTOLIC BLOOD PRESSURE: 78 MMHG | HEART RATE: 91 BPM | WEIGHT: 108.6 LBS | BODY MASS INDEX: 18.64 KG/M2 | SYSTOLIC BLOOD PRESSURE: 122 MMHG

## 2024-09-19 DIAGNOSIS — I10 BENIGN ESSENTIAL HYPERTENSION: Primary | ICD-10-CM

## 2024-09-19 DIAGNOSIS — G20.A2 PARKINSON'S DISEASE WITHOUT DYSKINESIA, WITH FLUCTUATING MANIFESTATIONS: ICD-10-CM

## 2024-09-19 DIAGNOSIS — H10.11 ALLERGIC CONJUNCTIVITIS OF RIGHT EYE: ICD-10-CM

## 2024-09-19 PROCEDURE — 3074F SYST BP LT 130 MM HG: CPT | Performed by: FAMILY MEDICINE

## 2024-09-19 PROCEDURE — 1159F MED LIST DOCD IN RCRD: CPT | Performed by: FAMILY MEDICINE

## 2024-09-19 PROCEDURE — 1036F TOBACCO NON-USER: CPT | Performed by: FAMILY MEDICINE

## 2024-09-19 PROCEDURE — 1160F RVW MEDS BY RX/DR IN RCRD: CPT | Performed by: FAMILY MEDICINE

## 2024-09-19 PROCEDURE — 99214 OFFICE O/P EST MOD 30 MIN: CPT | Performed by: FAMILY MEDICINE

## 2024-09-19 PROCEDURE — 3078F DIAST BP <80 MM HG: CPT | Performed by: FAMILY MEDICINE

## 2024-09-19 RX ORDER — KETOTIFEN FUMARATE 0.35 MG/ML
1 SOLUTION/ DROPS OPHTHALMIC 2 TIMES DAILY
Qty: 10 ML | Refills: 3 | Status: SHIPPED | OUTPATIENT
Start: 2024-09-19

## 2024-09-19 RX ORDER — RASAGILINE 0.5 MG/1
0.5 TABLET ORAL DAILY
Qty: 30 TABLET | Refills: 11 | Status: SHIPPED | OUTPATIENT
Start: 2024-09-19 | End: 2025-09-19

## 2024-09-19 RX ORDER — AMLODIPINE BESYLATE 5 MG/1
5 TABLET ORAL DAILY
Qty: 90 TABLET | Refills: 3 | Status: SHIPPED | OUTPATIENT
Start: 2024-09-19 | End: 2025-09-19

## 2024-09-19 RX ORDER — AMLODIPINE BESYLATE 5 MG/1
5 TABLET ORAL DAILY
COMMUNITY
End: 2024-09-19 | Stop reason: SDUPTHER

## 2024-09-19 ASSESSMENT — PATIENT HEALTH QUESTIONNAIRE - PHQ9
2. FEELING DOWN, DEPRESSED OR HOPELESS: NOT AT ALL
SUM OF ALL RESPONSES TO PHQ9 QUESTIONS 1 AND 2: 0
1. LITTLE INTEREST OR PLEASURE IN DOING THINGS: NOT AT ALL

## 2024-09-19 NOTE — PROGRESS NOTES
Subjective   Patient ID: Kaitlin Robles is a 79 y.o. female who presents for 1 month f/u .  HPI  Stopped the Mirapex because it made her too sleepy  No CP, palpitations  Occasionally with get some SOB  No dizziness  Right eye still tears a lot since March 2024- denies itching- not as bad as it was  No redness of conjunctiva  No eye pain  No numbness  Feels weak     Ophtho- 1/24  Dentist-been awhile  Colonoscopy- N/A  JOB-  FOBT-  UA/Micro-  Mammo- N/A  DEXA-  PAP- N/A  Lung CT-  Coronary Calcium CT Score-  AAA-  EKG-  RSV  Prevnar- 12/23  Flu-  Shingrix-  Td- 8/9/23  Hep C-  Advance Directives- has them  ETOH screen- 1/11/24  AWV- 1/11/24  MDD- 1/11/24    Current Outpatient Medications:     acetaminophen (TylenoL) 325 mg tablet, Take 1 tablet (325 mg) by mouth every 4 hours if needed., Disp: , Rfl:     ascorbic acid (Vitamin C) 1,000 mg tablet, Take 1 tablet (1,000 mg) by mouth once daily., Disp: 90 tablet, Rfl: 3    b complex 0.4 mg tablet, Take 1 tablet by mouth once daily., Disp: 90 tablet, Rfl: 3    cetirizine (ZyrTEC) 10 mg tablet, Take 1 tablet (10 mg) by mouth once daily., Disp: 90 tablet, Rfl: 3    cholecalciferol (Vitamin D3) 50 mcg (2,000 unit) capsule, Take 1 capsule (50 mcg) by mouth once daily., Disp: 90 capsule, Rfl: 3    diphenhydramine HCl (BENADRYL ORAL), Take 25 mg by mouth 4 times a day as needed (itching)., Disp: , Rfl:     Eliquis 5 mg tablet, Take 1 tablet (5 mg) by mouth 2 times a day., Disp: , Rfl:     fluticasone (Flonase) 50 mcg/actuation nasal spray, Administer 2 sprays into each nostril once daily., Disp: , Rfl:     amLODIPine (Norvasc) 5 mg tablet, Take 1 tablet (5 mg) by mouth once daily., Disp: 90 tablet, Rfl: 3    ketotifen (Zaditor) 0.025 % (0.035 %) ophthalmic solution, Administer 1 drop into the right eye 2 times a day., Disp: 10 mL, Rfl: 3    rasagiline (Azilect) 0.5 mg tablet, Take 1 tablet (0.5 mg) by mouth once daily., Disp: 30 tablet, Rfl: 11   Past Surgical History:    Procedure Laterality Date    BREAST LUMPECTOMY  09/25/2013    Breast Surgery Lumpectomy    COLONOSCOPY  03/26/2015    Complete Colonoscopy    ESOPHAGOGASTRODUODENOSCOPY  03/16/2016    Diagnostic Esophagogastroduodenoscopy    KNEE ARTHROSCOPY W/ DEBRIDEMENT  09/25/2013    Knee Arthroscopy (Therapeutic)    MR HEAD ANGIO WO IV CONTRAST  8/9/2023    MR HEAD ANGIO WO IV CONTRAST 8/9/2023 [facility]    MR NECK ANGIO WO IV CONTRAST  8/9/2023    MR NECK ANGIO WO IV CONTRAST 8/9/2023 [facility]    OTHER SURGICAL HISTORY  09/25/2013    Breast Surgery Enlargement Procedure Bilateral    OTHER SURGICAL HISTORY  09/25/2013    Breast Surgery Removal Of Mammary Implant Bilateral    OTHER SURGICAL HISTORY  04/18/2022    Cataract surgery    OTHER SURGICAL HISTORY  04/18/2022    Turbinate reduction    OTHER SURGICAL HISTORY  04/18/2022    [place] tooth extraction    OTHER SURGICAL HISTORY  11/20/2019    Cyst excision    TOTAL ABDOMINAL HYSTERECTOMY  09/25/2013    Total Abdominal Hysterectomy    TOTAL KNEE ARTHROPLASTY Right 05/22/2023    Dr. Potter      Past Medical History:   Diagnosis Date    Acute bronchitis due to other specified organisms 01/15/2020    Acute bronchitis due to other specified organisms    Acute maxillary sinusitis, unspecified 10/10/2017    Acute non-recurrent maxillary sinusitis    Bitten or stung by nonvenomous insect and other nonvenomous arthropods, initial encounter 07/12/2016    Bedbug bite, initial encounter    Cervicalgia 02/28/2023    Closed compression fracture of thoracic vertebra (Multi) 02/28/2023    Encounter for screening for cardiovascular disorders 04/11/2016    Encounter for screening for cardiovascular disorders    Encounter for screening for diabetes mellitus 04/11/2016    Encounter for screening for diabetes mellitus    Encounter for screening for lipoid disorders 04/11/2016    Encounter for screening for lipoid disorders    Encounter for screening for osteoporosis 03/30/2021    Osteoporosis  screening    Enteroviral vesicular pharyngitis 08/19/2015    Herpangina    Facial droop 09/07/2023    Gastroduodenitis 09/07/2023    Impacted cerumen, left ear 06/19/2018    Impacted cerumen of left ear    Impacted cerumen, right ear 08/29/2018    Impacted cerumen of right ear    Localized enlarged lymph nodes 10/30/2019    Cervical lymphadenopathy    Localized enlarged lymph nodes 11/30/2016    Lymphadenopathy, inguinal    Localized swelling, mass and lump, head 09/23/2019    Lump of scalp    Oral mucositis (ulcerative), unspecified 09/07/2023    Osteoarthritis 02/28/2023    Otalgia, right ear 01/11/2016    Right ear pain    Other acute sinusitis 01/15/2020    Other acute sinusitis, recurrence not specified    Other fecal abnormalities 11/15/2018    Positive FIT (fecal immunochemical test)    Other forms of stomatitis 01/25/2017    Oral ulcer    Other malaise 11/20/2017    Malaise and fatigue    Other specified disorders of eustachian tube, bilateral 02/23/2018    Dysfunction of both eustachian tubes    Other specified disorders of eustachian tube, right ear 01/11/2016    Dysfunction of right eustachian tube    Personal history of diseases of the skin and subcutaneous tissue 01/06/2017    History of impetigo    Personal history of other diseases of the circulatory system     History of hypertension    Personal history of other diseases of the digestive system 11/18/2015    History of acute gastritis    Personal history of other diseases of the respiratory system 08/25/2014    Personal history of acute sinusitis    Personal history of other diseases of the respiratory system 02/23/2018    History of acute bronchitis    Personal history of other diseases of the respiratory system 10/31/2016    History of acute sinusitis    Personal history of other diseases of the respiratory system 12/16/2013    History of acute bronchitis    Personal history of other infectious and parasitic diseases 01/25/2017    History of  candidiasis of mouth    Personal history of other specified conditions 06/19/2018    History of dizziness    Personal history of other specified conditions 12/28/2015    History of fatigue    Personal history of other specified conditions 11/12/2021    History of weight loss    Personal history of other specified conditions 01/02/2019    History of urinary frequency    Personal history of other specified conditions 10/12/2022    History of abnormal weight loss    Personal history of other specified conditions     History of painful urination    Personal history of other specified conditions 03/26/2015    History of shortness of breath    Radiculopathy, cervical region 10/10/2017    Cervical radiculopathy    Segmental and somatic dysfunction of cervical region 05/03/2017    Cervical somatic dysfunction    Spondylosis without myelopathy or radiculopathy, cervical region 09/19/2017    Cervical spondylosis    Sprain of ligaments of lumbar spine, initial encounter 02/23/2018    Lumbar sprain, initial encounter    Unspecified hearing loss, right ear 08/29/2018    Hearing loss, right    Urinary tract infection, site not specified 09/13/2021    Acute lower UTI     Social History     Tobacco Use    Smoking status: Never    Smokeless tobacco: Never   Vaping Use    Vaping status: Never Used   Substance Use Topics    Alcohol use: Never    Drug use: Never      Family History   Problem Relation Name Age of Onset    Allergies Mother      Heart failure Mother      Mental illness Mother        Review of Systems    Objective   /78   Pulse 91   Wt 49.3 kg (108 lb 9.6 oz)   SpO2 99%   BMI 18.64 kg/m²    Physical Exam  Vitals and nursing note reviewed.   Constitutional:       Appearance: Normal appearance.   HENT:      Head: Normocephalic and atraumatic.      Right Ear: Tympanic membrane, ear canal and external ear normal. Decreased hearing noted.      Left Ear: Tympanic membrane, ear canal and external ear normal. Decreased  hearing noted.      Nose: Nose normal.      Mouth/Throat:      Mouth: Mucous membranes are moist.      Pharynx: Oropharynx is clear.   Eyes:      Extraocular Movements: Extraocular movements intact.      Conjunctiva/sclera: Conjunctivae normal.      Pupils: Pupils are equal, round, and reactive to light.   Cardiovascular:      Rate and Rhythm: Normal rate and regular rhythm.      Pulses: Normal pulses.      Heart sounds: Normal heart sounds.   Pulmonary:      Effort: Pulmonary effort is normal.      Breath sounds: Normal breath sounds.   Abdominal:      General: Abdomen is flat. Bowel sounds are normal.      Palpations: Abdomen is soft.      Tenderness: There is no abdominal tenderness. There is no right CVA tenderness or left CVA tenderness.   Musculoskeletal:      Cervical back: Normal range of motion and neck supple.      Comments: TTP in left lower back with increased muscle tone   Lymphadenopathy:      Cervical: No cervical adenopathy.   Skin:     Capillary Refill: Capillary refill takes less than 2 seconds.   Neurological:      General: No focal deficit present.      Mental Status: She is alert and oriented to person, place, and time.      Gait: Gait abnormal.      Comments: Shuffling gait  No tremor  Hard time getting out of chair   Psychiatric:         Mood and Affect: Mood normal. Affect is flat.         Behavior: Behavior normal.         Assessment/Plan   Problem List Items Addressed This Visit       Parkinson's disease without dyskinesia, with fluctuating manifestations (Multi)    Relevant Medications    rasagiline (Azilect) 0.5 mg tablet    Benign essential hypertension - Primary    Relevant Medications    amLODIPine (Norvasc) 5 mg tablet     Other Visit Diagnoses       Allergic conjunctivitis of right eye        Relevant Medications    ketotifen (Zaditor) 0.025 % (0.035 %) ophthalmic solution        Parkinson's- Azilect, follow up with Jaz[facility]    Allergic Conjunctivitis- Zaditor, avoid allergens    HTN-  Amlodipine, DASH diet    Patient understands and agrees with treatment plan    Tenzin Waldrop, DO

## 2024-10-17 ENCOUNTER — APPOINTMENT (OUTPATIENT)
Dept: PRIMARY CARE | Facility: CLINIC | Age: 79
End: 2024-10-17
Payer: MEDICARE

## 2024-10-17 VITALS
OXYGEN SATURATION: 98 % | SYSTOLIC BLOOD PRESSURE: 142 MMHG | HEART RATE: 95 BPM | DIASTOLIC BLOOD PRESSURE: 82 MMHG | BODY MASS INDEX: 18.92 KG/M2 | WEIGHT: 110.2 LBS

## 2024-10-17 DIAGNOSIS — G20.A2 PARKINSON'S DISEASE WITHOUT DYSKINESIA, WITH FLUCTUATING MANIFESTATIONS: ICD-10-CM

## 2024-10-17 PROCEDURE — 1036F TOBACCO NON-USER: CPT

## 2024-10-17 PROCEDURE — G2211 COMPLEX E/M VISIT ADD ON: HCPCS

## 2024-10-17 PROCEDURE — 1159F MED LIST DOCD IN RCRD: CPT

## 2024-10-17 PROCEDURE — 1160F RVW MEDS BY RX/DR IN RCRD: CPT

## 2024-10-17 PROCEDURE — 3077F SYST BP >= 140 MM HG: CPT

## 2024-10-17 PROCEDURE — 3079F DIAST BP 80-89 MM HG: CPT

## 2024-10-17 PROCEDURE — 99214 OFFICE O/P EST MOD 30 MIN: CPT

## 2024-10-17 RX ORDER — RASAGILINE 0.5 MG/1
0.5 TABLET ORAL DAILY
Qty: 30 TABLET | Refills: 11 | Status: SHIPPED | OUTPATIENT
Start: 2024-10-17 | End: 2025-10-17

## 2024-10-17 NOTE — PROGRESS NOTES
Subjective   Patient ID: Kaitlin Robles is a 79 y.o. female who presents for Follow-up.  HPI  - Did not start rasagaline because she was worried about the side effects of fatigue, serotonin syndrome   - feels like foods with tyramine make her worse     MOTOR SYMPTOMS:  Balance difficulty Yes- using rollator most of the time, keeps it closeby,   Falls - no falls in a few months or even a year  Exercise/PT No -- had in home PT, still does exercises on her own.  Help with ADLs Yes- needs some help getting socks on sometimes, showering. Some difficulty getting dressed but can do it. Lives alone. Issues with cooking/standing long periods of time because of her back. Does laundry and dishes. Company comes on Monday and Tuesday and helps her shower, house work, prepares meals, groceries. Monthly nurse talked to them about hospice and or palliative to help more so she does not have to pay as much for the services .. Costing her 600$ a month OOP for the helpers.     NON MOTOR SYMPTOMS  Orthostatic lightheadedness No  Cognitive - has been better recently , cousin feels she is very sharp  Depression/apathy- better  Anxiety No  Insomnia/RBD - has been better.  Fatigue- yes,especially if she is out and about   Sialorrhea Yes  Hypophonia - occasionally   Dysphagia - no  Constipation - yes  Urinary dysfunction Yes- some daytime and night time incontinence         Meds:   Current: rasagiline rx'ed last visit but has not started  Previously tried: ropinirole caused itchiness , levodopa did not help and felt it made her tired, mirapex caused sleepiness          Current Outpatient Medications:     acetaminophen (TylenoL) 325 mg tablet, Take 1 tablet (325 mg) by mouth every 4 hours if needed., Disp: , Rfl:     amLODIPine (Norvasc) 5 mg tablet, Take 1 tablet (5 mg) by mouth once daily., Disp: 90 tablet, Rfl: 3    b complex 0.4 mg tablet, Take 1 tablet by mouth once daily., Disp: 90 tablet, Rfl: 3    cetirizine (ZyrTEC) 10 mg tablet,  Take 1 tablet (10 mg) by mouth once daily., Disp: 90 tablet, Rfl: 3    diphenhydramine HCl (BENADRYL ORAL), Take 25 mg by mouth 4 times a day as needed (itching)., Disp: , Rfl:     Eliquis 5 mg tablet, Take 1 tablet (5 mg) by mouth 2 times a day., Disp: , Rfl:     fluticasone (Flonase) 50 mcg/actuation nasal spray, Administer 2 sprays into each nostril once daily., Disp: , Rfl:     ketotifen (Zaditor) 0.025 % (0.035 %) ophthalmic solution, Administer 1 drop into the right eye 2 times a day., Disp: 10 mL, Rfl: 3    rasagiline (Azilect) 0.5 mg tablet, Take 1 tablet (0.5 mg) by mouth once daily., Disp: 30 tablet, Rfl: 11   Past Surgical History:   Procedure Laterality Date    BREAST LUMPECTOMY  09/25/2013    Breast Surgery Lumpectomy    COLONOSCOPY  03/26/2015    Complete Colonoscopy    ESOPHAGOGASTRODUODENOSCOPY  03/16/2016    Diagnostic Esophagogastroduodenoscopy    KNEE ARTHROSCOPY W/ DEBRIDEMENT  09/25/2013    Knee Arthroscopy (Therapeutic)    MR HEAD ANGIO WO IV CONTRAST  8/9/2023    MR HEAD ANGIO WO IV CONTRAST 8/9/2023 [facility]    MR NECK ANGIO WO IV CONTRAST  8/9/2023    MR NECK ANGIO WO IV CONTRAST 8/9/2023 [facility]    OTHER SURGICAL HISTORY  09/25/2013    Breast Surgery Enlargement Procedure Bilateral    OTHER SURGICAL HISTORY  09/25/2013    Breast Surgery Removal Of Mammary Implant Bilateral    OTHER SURGICAL HISTORY  04/18/2022    Cataract surgery    OTHER SURGICAL HISTORY  04/18/2022    Turbinate reduction    OTHER SURGICAL HISTORY  04/18/2022    [place] tooth extraction    OTHER SURGICAL HISTORY  11/20/2019    Cyst excision    TOTAL ABDOMINAL HYSTERECTOMY  09/25/2013    Total Abdominal Hysterectomy    TOTAL KNEE ARTHROPLASTY Right 05/22/2023    Dr. Potter      Past Medical History:   Diagnosis Date    Acute bronchitis due to other specified organisms 01/15/2020    Acute bronchitis due to other specified organisms    Acute maxillary sinusitis, unspecified 10/10/2017    Acute non-recurrent maxillary  sinusitis    Bitten or stung by nonvenomous insect and other nonvenomous arthropods, initial encounter 07/12/2016    Bedbug bite, initial encounter    Cervicalgia 02/28/2023    Closed compression fracture of thoracic vertebra (Multi) 02/28/2023    Encounter for screening for cardiovascular disorders 04/11/2016    Encounter for screening for cardiovascular disorders    Encounter for screening for diabetes mellitus 04/11/2016    Encounter for screening for diabetes mellitus    Encounter for screening for lipoid disorders 04/11/2016    Encounter for screening for lipoid disorders    Encounter for screening for osteoporosis 03/30/2021    Osteoporosis screening    Enteroviral vesicular pharyngitis 08/19/2015    Herpangina    Facial droop 09/07/2023    Gastroduodenitis 09/07/2023    Impacted cerumen, left ear 06/19/2018    Impacted cerumen of left ear    Impacted cerumen, right ear 08/29/2018    Impacted cerumen of right ear    Localized enlarged lymph nodes 10/30/2019    Cervical lymphadenopathy    Localized enlarged lymph nodes 11/30/2016    Lymphadenopathy, inguinal    Localized swelling, mass and lump, head 09/23/2019    Lump of scalp    Oral mucositis (ulcerative), unspecified 09/07/2023    Osteoarthritis 02/28/2023    Otalgia, right ear 01/11/2016    Right ear pain    Other acute sinusitis 01/15/2020    Other acute sinusitis, recurrence not specified    Other fecal abnormalities 11/15/2018    Positive FIT (fecal immunochemical test)    Other forms of stomatitis 01/25/2017    Oral ulcer    Other malaise 11/20/2017    Malaise and fatigue    Other specified disorders of eustachian tube, bilateral 02/23/2018    Dysfunction of both eustachian tubes    Other specified disorders of eustachian tube, right ear 01/11/2016    Dysfunction of right eustachian tube    Personal history of diseases of the skin and subcutaneous tissue 01/06/2017    History of impetigo    Personal history of other diseases of the circulatory system      History of hypertension    Personal history of other diseases of the digestive system 11/18/2015    History of acute gastritis    Personal history of other diseases of the respiratory system 08/25/2014    Personal history of acute sinusitis    Personal history of other diseases of the respiratory system 02/23/2018    History of acute bronchitis    Personal history of other diseases of the respiratory system 10/31/2016    History of acute sinusitis    Personal history of other diseases of the respiratory system 12/16/2013    History of acute bronchitis    Personal history of other infectious and parasitic diseases 01/25/2017    History of candidiasis of mouth    Personal history of other specified conditions 06/19/2018    History of dizziness    Personal history of other specified conditions 12/28/2015    History of fatigue    Personal history of other specified conditions 11/12/2021    History of weight loss    Personal history of other specified conditions 01/02/2019    History of urinary frequency    Personal history of other specified conditions 10/12/2022    History of abnormal weight loss    Personal history of other specified conditions     History of painful urination    Personal history of other specified conditions 03/26/2015    History of shortness of breath    Radiculopathy, cervical region 10/10/2017    Cervical radiculopathy    Segmental and somatic dysfunction of cervical region 05/03/2017    Cervical somatic dysfunction    Spondylosis without myelopathy or radiculopathy, cervical region 09/19/2017    Cervical spondylosis    Sprain of ligaments of lumbar spine, initial encounter 02/23/2018    Lumbar sprain, initial encounter    Unspecified hearing loss, right ear 08/29/2018    Hearing loss, right    Urinary tract infection, site not specified 09/13/2021    Acute lower UTI     Social History     Tobacco Use    Smoking status: Never    Smokeless tobacco: Never   Vaping Use    Vaping status: Never Used    Substance Use Topics    Alcohol use: Never    Drug use: Never      Family History   Problem Relation Name Age of Onset    Allergies Mother      Heart failure Mother      Mental illness Mother        Review of Systems  10 point ROS negative except as otherwise noted in the HPI.      Objective   /82   Pulse 95   Wt 50 kg (110 lb 3.2 oz)   SpO2 98%   BMI 18.92 kg/m²    Physical Exam    Alert and oriented to person, place, time, and situation.   Facial muscles with mild L facial droop with significant masked facies.  Hypophonic, psychomotor slowing.   Ocular versions intact.   Speech fluent to history and with mild hypophonia.   Minimal rigidity   Bradykinetic in BUE and BLE for finger tapping and toe tapping, worse on L   No tremors or dyskinesia.  Unable to rise from chair without both pushing off and assistance from a person.   Stooped posture, slow unsteady gait, severe shuffling. Retropulsion.   RRR, no m/g/r.  Lungs CTA b/l    Assessment/Plan   Problem List Items Addressed This Visit       Parkinson's disease without dyskinesia, with fluctuating manifestations  - Pt with PD w predominant rigidity and bradykinesia, no tremor. Sx worse on the L. Gait and mobility severely impaired and I am very concerned about this. I do feel she is worsening. She is using a rollator and has in home help a few days but it is very expensive for her and still leaves her without help most of the time. She and cousin are asking about a palliative referral which I agree with and placed today. Discussed the difference  between hospice and palliative. Continue rollator and home PT exercises. In home PT ended.  - Pt was not previously able to tolerate CDLD as she felt it made her tired though she has been off and tiredness never improved. Did not feel it helped her either. Mirapex made her tired. Ropinirole caused itching. She did not start rasagaline as she was worried about serotonin syndrome. We discussed this is an extremely  low risk and was not reported in clinical trials, and in an article from [employer] even w concurrent SSRI use was <0.1% incidence. She is willing to try it. She also asked about giving levodopa another try. I did explain to her she was likely under medicated before which is why we did not think it was working, but given her sensitivity to meds would like to try one thing at a time so she will start w rasagaline.  - Of note, pt does have a bit of a L side facial droop. This has been noted previously including Aug 2023 when she had MRI for this which was normal. May be in part due to her partials not fitting.    Relevant Medications    rasagiline (Azilect) 0.5 mg tablet    Other Relevant Orders    Referral to Palliative Care       Discussed at visit any disease processes that were of concern as well as the risks, benefits and instructions on any new medication provided. Patient (and/or caretaker of patient if present) stated all questions were answered, and they voiced understanding of instructions.     Jazmine Alcala PA-C

## 2024-10-17 NOTE — PATIENT INSTRUCTIONS
Try a low dose of the rasagaline and we will put in a palliative care referral to meet with you.       -Support groups may be helpful to patients and their families with Parkinson's disease. Organizations such as the Parkinson's Foundation, American Parkinson Disease Association, and National [place] of Neurological Disorders and Stroke may also be helpful as they can provide reliable information and resources. The Parkinson's Disease Handbook is also available through the American Parkinson Disease Association, which can provide helpful information to patients and families. In the [place] area, particular events and programs geared toward Parkinson's Disease include [employer], [employer], and the annual Parkinson's Bootcamp.   -Studies have suggested that exercise can slow the progression of disease, and it also can help patients feel better physically, mentally, and allow for social interaction. It also can help with the stiffness and bent posture that some patients with Parkinson's experience. Exercise routines should include things like dance and/or chay chi which can help with balance and flexibility. Other aerobic exercises like walking, biking, or swimming can also be helpful. It may be helpful to work with a physical therapist.   -Safety for patients with Parkinson's disease is very important. We want to prevent falls as much as possible, and this risk increases as the disease progresses. Consider changes in the home to make the bathtub or shower safer. Avoid have loose rugs around the house and try to make sure the house is well lit especially at night. Again, physical therapy may be helpful for falls prevention. Speech therapy may also be helpful to help with slurred or quiet speech, and to help avoid choking and problems swallowing. [facility] can help located Parkinson-trained speech therapists near you.   -Finally, Mediterranean style diet may help decrease the risk of  "dementia and Alzheimer 's disease. Some patients struggle with constipation from the Parkinson's disease or the medications. Make sure you speak with your provider if you are experiencing this, but some steps including increasing hydration or using a stool softener may be helpful.     The above information is paraphrased from \"UpToDate Patient Education: Parkinson disease treatment options- education, support, and therapy (Beyond The Basics)\"   "

## 2025-01-16 ENCOUNTER — APPOINTMENT (OUTPATIENT)
Dept: PRIMARY CARE | Facility: CLINIC | Age: 80
End: 2025-01-16
Payer: MEDICARE

## 2025-01-23 ENCOUNTER — APPOINTMENT (OUTPATIENT)
Dept: PRIMARY CARE | Facility: CLINIC | Age: 80
End: 2025-01-23
Payer: MEDICARE

## 2025-01-23 VITALS
DIASTOLIC BLOOD PRESSURE: 86 MMHG | HEART RATE: 88 BPM | SYSTOLIC BLOOD PRESSURE: 138 MMHG | BODY MASS INDEX: 19.29 KG/M2 | HEIGHT: 64 IN | OXYGEN SATURATION: 99 % | WEIGHT: 113 LBS

## 2025-01-23 DIAGNOSIS — Z00.00 ROUTINE GENERAL MEDICAL EXAMINATION AT HEALTH CARE FACILITY: Primary | ICD-10-CM

## 2025-01-23 DIAGNOSIS — K21.9 GASTROESOPHAGEAL REFLUX DISEASE WITHOUT ESOPHAGITIS: ICD-10-CM

## 2025-01-23 DIAGNOSIS — G20.B2 PARKINSON'S DISEASE WITH DYSKINESIA AND FLUCTUATING MANIFESTATIONS: ICD-10-CM

## 2025-01-23 DIAGNOSIS — R05.3 CHRONIC COUGH: ICD-10-CM

## 2025-01-23 DIAGNOSIS — E46 PROTEIN-CALORIE MALNUTRITION, UNSPECIFIED SEVERITY (MULTI): ICD-10-CM

## 2025-01-23 DIAGNOSIS — F33.1 MDD (MAJOR DEPRESSIVE DISORDER), RECURRENT EPISODE, MODERATE: ICD-10-CM

## 2025-01-23 DIAGNOSIS — G43.009 MIGRAINE WITHOUT AURA AND WITHOUT STATUS MIGRAINOSUS, NOT INTRACTABLE: ICD-10-CM

## 2025-01-23 DIAGNOSIS — I73.9 PERIPHERAL VASCULAR DISEASE (CMS-HCC): ICD-10-CM

## 2025-01-23 DIAGNOSIS — J32.4 CHRONIC PANSINUSITIS: ICD-10-CM

## 2025-01-23 DIAGNOSIS — I48.0 PAROXYSMAL ATRIAL FIBRILLATION (MULTI): ICD-10-CM

## 2025-01-23 DIAGNOSIS — I10 BENIGN ESSENTIAL HYPERTENSION: ICD-10-CM

## 2025-01-23 LAB
ALBUMIN SERPL BCP-MCNC: 4.6 G/DL (ref 3.4–5)
ALP SERPL-CCNC: 71 U/L (ref 33–136)
ALT SERPL W P-5'-P-CCNC: 13 U/L (ref 7–45)
ANION GAP SERPL CALC-SCNC: 12 MMOL/L (ref 10–20)
AST SERPL W P-5'-P-CCNC: 14 U/L (ref 9–39)
BILIRUB SERPL-MCNC: 0.6 MG/DL (ref 0–1.2)
BUN SERPL-MCNC: 18 MG/DL (ref 6–23)
CALCIUM SERPL-MCNC: 9.8 MG/DL (ref 8.6–10.3)
CHLORIDE SERPL-SCNC: 104 MMOL/L (ref 98–107)
CHOLEST SERPL-MCNC: 261 MG/DL (ref 0–199)
CHOLESTEROL/HDL RATIO: 2.9
CO2 SERPL-SCNC: 28 MMOL/L (ref 21–32)
CREAT SERPL-MCNC: 0.88 MG/DL (ref 0.5–1.05)
EGFRCR SERPLBLD CKD-EPI 2021: 68 ML/MIN/1.73M*2
ERYTHROCYTE [DISTWIDTH] IN BLOOD BY AUTOMATED COUNT: 12.5 % (ref 11.5–14.5)
GLUCOSE SERPL-MCNC: 97 MG/DL (ref 74–99)
HCT VFR BLD AUTO: 40.1 % (ref 36–46)
HDLC SERPL-MCNC: 90.6 MG/DL
HGB BLD-MCNC: 12.8 G/DL (ref 12–16)
LDLC SERPL CALC-MCNC: 155 MG/DL
MCH RBC QN AUTO: 30.9 PG (ref 26–34)
MCHC RBC AUTO-ENTMCNC: 31.9 G/DL (ref 32–36)
MCV RBC AUTO: 97 FL (ref 80–100)
NON HDL CHOLESTEROL: 170 MG/DL (ref 0–149)
NRBC BLD-RTO: 0 /100 WBCS (ref 0–0)
PLATELET # BLD AUTO: 261 X10*3/UL (ref 150–450)
POTASSIUM SERPL-SCNC: 4.3 MMOL/L (ref 3.5–5.3)
PROT SERPL-MCNC: 6.5 G/DL (ref 6.4–8.2)
RBC # BLD AUTO: 4.14 X10*6/UL (ref 4–5.2)
SODIUM SERPL-SCNC: 140 MMOL/L (ref 136–145)
TRIGL SERPL-MCNC: 77 MG/DL (ref 0–149)
TSH SERPL-ACNC: 0.82 MIU/L (ref 0.44–3.98)
VLDL: 15 MG/DL (ref 0–40)
WBC # BLD AUTO: 4.5 X10*3/UL (ref 4.4–11.3)

## 2025-01-23 PROCEDURE — 80053 COMPREHEN METABOLIC PANEL: CPT

## 2025-01-23 PROCEDURE — 84443 ASSAY THYROID STIM HORMONE: CPT

## 2025-01-23 PROCEDURE — 3075F SYST BP GE 130 - 139MM HG: CPT | Performed by: FAMILY MEDICINE

## 2025-01-23 PROCEDURE — 80061 LIPID PANEL: CPT

## 2025-01-23 PROCEDURE — 99397 PER PM REEVAL EST PAT 65+ YR: CPT | Performed by: FAMILY MEDICINE

## 2025-01-23 PROCEDURE — 1160F RVW MEDS BY RX/DR IN RCRD: CPT | Performed by: FAMILY MEDICINE

## 2025-01-23 PROCEDURE — 1159F MED LIST DOCD IN RCRD: CPT | Performed by: FAMILY MEDICINE

## 2025-01-23 PROCEDURE — 1170F FXNL STATUS ASSESSED: CPT | Performed by: FAMILY MEDICINE

## 2025-01-23 PROCEDURE — 99214 OFFICE O/P EST MOD 30 MIN: CPT | Performed by: FAMILY MEDICINE

## 2025-01-23 PROCEDURE — 3079F DIAST BP 80-89 MM HG: CPT | Performed by: FAMILY MEDICINE

## 2025-01-23 PROCEDURE — G0439 PPPS, SUBSEQ VISIT: HCPCS | Performed by: FAMILY MEDICINE

## 2025-01-23 PROCEDURE — 85027 COMPLETE CBC AUTOMATED: CPT

## 2025-01-23 PROCEDURE — 1036F TOBACCO NON-USER: CPT | Performed by: FAMILY MEDICINE

## 2025-01-23 RX ORDER — AZITHROMYCIN 250 MG/1
TABLET, FILM COATED ORAL
Qty: 6 TABLET | Refills: 1 | Status: SHIPPED | OUTPATIENT
Start: 2025-01-23

## 2025-01-23 ASSESSMENT — PATIENT HEALTH QUESTIONNAIRE - PHQ9
1. LITTLE INTEREST OR PLEASURE IN DOING THINGS: NOT AT ALL
SUM OF ALL RESPONSES TO PHQ9 QUESTIONS 1 AND 2: 0
2. FEELING DOWN, DEPRESSED OR HOPELESS: NOT AT ALL

## 2025-01-23 ASSESSMENT — ENCOUNTER SYMPTOMS
EYE PAIN: 0
POLYDIPSIA: 0
CHEST TIGHTNESS: 0
HEMATURIA: 0
EYE REDNESS: 0
SHORTNESS OF BREATH: 1
NERVOUS/ANXIOUS: 0
VOMITING: 0
WEAKNESS: 1
DIARRHEA: 0
ARTHRALGIAS: 1
LOSS OF SENSATION IN FEET: 0
ABDOMINAL PAIN: 0
HEADACHES: 1
COUGH: 0
NAUSEA: 0
NUMBNESS: 1
OCCASIONAL FEELINGS OF UNSTEADINESS: 0
BLOOD IN STOOL: 0
DIZZINESS: 0
DYSPHORIC MOOD: 1
TROUBLE SWALLOWING: 0
PALPITATIONS: 0
WHEEZING: 0
CONSTIPATION: 0
FREQUENCY: 0
DEPRESSION: 0
CHILLS: 0
FEVER: 0
EYE DISCHARGE: 1
TREMORS: 0
ADENOPATHY: 0
COLOR CHANGE: 0
FATIGUE: 0
SORE THROAT: 0
BRUISES/BLEEDS EASILY: 0
BACK PAIN: 0
DYSURIA: 0
POLYPHAGIA: 0

## 2025-01-23 ASSESSMENT — ACTIVITIES OF DAILY LIVING (ADL)
TAKING_MEDICATION: NEEDS ASSISTANCE
DOING_HOUSEWORK: NEEDS ASSISTANCE
MANAGING_FINANCES: NEEDS ASSISTANCE
DRESSING: INDEPENDENT
BATHING: INDEPENDENT
GROCERY_SHOPPING: NEEDS ASSISTANCE

## 2025-01-23 NOTE — ASSESSMENT & PLAN NOTE
Orders:    azithromycin (Zithromax) 250 mg tablet; Take 2 tabs (500 mg) by mouth today, than 1 daily for 4 days.

## 2025-01-23 NOTE — PROGRESS NOTES
Subjective   Reason for Visit: Kaitlin Robles is an 79 y.o. female here for a Medicare Wellness visit.     Past Medical, Surgical, and Family History reviewed and updated in chart.    Reviewed all medications by prescribing practitioner or clinical pharmacist (such as prescriptions, OTCs, herbal therapies and supplements) and documented in the medical record.    HPI  Having a lot of sinus drainage  Right eye howe all the time- will itch at times- some discomfort off and on  TMJ is bothering her    No CP, palpitations  Occasionally with get some SOB  No dizziness  No numbness  Feels weak  Occasional HA     Ophtho- 1/24- will be seeing  Dentist-been awhile  Colonoscopy- N/A  JOB-  FOBT-  UA/Micro-  Mammo- N/A  DEXA-  PAP- N/A  Lung CT-  Coronary Calcium CT Score-  AAA-  EKG-  RSV  Prevnar- 12/23  Flu- refused  Shingrix-  Td- 8/9/23  Hep C-  Advance Directives- has them  ETOH screen- 1/23/25  AWV- 1/23/25  MDD- 1/23/25  SHERRIE-    Patient Care Team:  Tenzin Waldrop DO as PCP - General  Tenzin Waldrop DO as PCP - [employer]age PCP  Willie Potter DO as Consulting Physician (Orthopaedic Surgery)  Jazmine Alcala PA-C as Physician Assistant (Neurology)  Sandip Ramírez MD as Surgeon (Ophthalmology)  Narciso Reardon DPM as Referring Physician (Podiatry)  Yuri Marquez MD as Consulting Physician (Cardiology)     Review of Systems   Constitutional:  Negative for chills, fatigue and fever.   HENT:  Positive for congestion. Negative for ear discharge, ear pain, hearing loss, nosebleeds, sore throat, tinnitus and trouble swallowing.    Eyes:  Positive for discharge. Negative for pain, redness and visual disturbance.   Respiratory:  Positive for shortness of breath. Negative for cough, chest tightness and wheezing.    Cardiovascular:  Negative for chest pain, palpitations and leg swelling.   Gastrointestinal:  Negative for abdominal pain, blood in stool, constipation, diarrhea, nausea and vomiting.  "  Endocrine: Negative for cold intolerance, heat intolerance, polydipsia, polyphagia and polyuria.   Genitourinary:  Negative for dysuria, frequency, hematuria and urgency.   Musculoskeletal:  Positive for arthralgias. Negative for back pain and gait problem.   Skin:  Negative for color change and rash.   Neurological:  Positive for weakness, numbness and headaches. Negative for dizziness, tremors and syncope.   Hematological:  Negative for adenopathy. Does not bruise/bleed easily.   Psychiatric/Behavioral:  Positive for dysphoric mood. The patient is not nervous/anxious.        Objective   Vitals:  /80 (BP Location: Right arm, Patient Position: Sitting, BP Cuff Size: Adult)   Pulse 88   Ht 1.626 m (5' 4\")   Wt 51.3 kg (113 lb)   SpO2 99%   BMI 19.40 kg/m²       Physical Exam  Vitals and nursing note reviewed.   Constitutional:       Appearance: Normal appearance.   HENT:      Head: Normocephalic and atraumatic.      Right Ear: Tympanic membrane, ear canal and external ear normal. Decreased hearing noted.      Left Ear: Tympanic membrane, ear canal and external ear normal. Decreased hearing noted.      Nose: Nose normal.      Mouth/Throat:      Mouth: Mucous membranes are moist.      Pharynx: Oropharynx is clear.   Eyes:      Extraocular Movements: Extraocular movements intact.      Conjunctiva/sclera: Conjunctivae normal.      Pupils: Pupils are equal, round, and reactive to light.   Cardiovascular:      Rate and Rhythm: Normal rate and regular rhythm.      Pulses: Normal pulses.      Heart sounds: Normal heart sounds.   Pulmonary:      Effort: Pulmonary effort is normal.      Breath sounds: Normal breath sounds.   Abdominal:      General: Abdomen is flat. Bowel sounds are normal.      Palpations: Abdomen is soft.      Tenderness: There is no abdominal tenderness. There is no right CVA tenderness or left CVA tenderness.   Musculoskeletal:      Cervical back: Normal range of motion and neck supple.      " Comments: TTP in left lower back with increased muscle tone   Lymphadenopathy:      Cervical: No cervical adenopathy.   Skin:     Capillary Refill: Capillary refill takes less than 2 seconds.   Neurological:      General: No focal deficit present.      Mental Status: She is alert and oriented to person, place, and time.      Gait: Gait abnormal.      Comments: Shuffling gait  No tremor  Hard time getting out of chair   Psychiatric:         Mood and Affect: Mood normal. Affect is flat.         Behavior: Behavior normal.         Assessment & Plan  Routine general medical examination at health care facility    Orders:    1 Year Follow Up In Primary Care - Wellness Exam; Future    Parkinson's disease with dyskinesia and fluctuating manifestations         Paroxysmal atrial fibrillation (Multi)         Peripheral vascular disease (CMS-HCC)         MDD (major depressive disorder), recurrent episode, moderate         Protein-calorie malnutrition, unspecified severity (Multi)         Benign essential hypertension    Orders:    Comprehensive Metabolic Panel    Lipid Panel    CBC    TSH with reflex to Free T4 if abnormal    Chronic pansinusitis         Migraine without aura and without status migrainosus, not intractable         Gastroesophageal reflux disease without esophagitis         Chronic cough    Orders:    azithromycin (Zithromax) 250 mg tablet; Take 2 tabs (500 mg) by mouth today, than 1 daily for 4 days.       Renewed/continued rest of medications  Checked  labs  Updated Health Maintenance in HPI section  HTN, controlled- continue meds, low sodium diet     Malnutrition- increase number of small meals with nutrient rich foods     Hyperlipidemia- continue meds, low fat/cholesterol diet     MDD- does not feel needs meds, talk with others     Parkinson's- Sinemet, follow with neurology     A. Fib- limit caffeine, plavix     PVD- plavix, activity     FM- heat, activity safely     GERD- avoid triggers, no meds needed at  this time     IBS-  fiber, FODMAP diet     F/U 6 months

## 2025-01-23 NOTE — ASSESSMENT & PLAN NOTE
Orders:    Comprehensive Metabolic Panel    Lipid Panel    CBC    TSH with reflex to Free T4 if abnormal

## 2025-04-10 ENCOUNTER — TELEPHONE (OUTPATIENT)
Dept: PRIMARY CARE | Facility: CLINIC | Age: 80
End: 2025-04-10
Payer: MEDICARE

## 2025-07-23 ENCOUNTER — APPOINTMENT (OUTPATIENT)
Dept: CARDIOLOGY | Facility: HOSPITAL | Age: 80
End: 2025-07-23
Payer: MEDICARE

## 2025-07-23 ENCOUNTER — HOSPITAL ENCOUNTER (OUTPATIENT)
Facility: HOSPITAL | Age: 80
Setting detail: OBSERVATION
End: 2025-07-23
Attending: STUDENT IN AN ORGANIZED HEALTH CARE EDUCATION/TRAINING PROGRAM | Admitting: INTERNAL MEDICINE
Payer: MEDICARE

## 2025-07-23 ENCOUNTER — APPOINTMENT (OUTPATIENT)
Dept: RADIOLOGY | Facility: HOSPITAL | Age: 80
End: 2025-07-23
Payer: MEDICARE

## 2025-07-23 DIAGNOSIS — L98.9 FACIAL LESION: ICD-10-CM

## 2025-07-23 DIAGNOSIS — R53.1 GENERALIZED WEAKNESS: Primary | ICD-10-CM

## 2025-07-23 DIAGNOSIS — G20.A1 PARKINSON'S DISEASE, UNSPECIFIED WHETHER DYSKINESIA PRESENT, UNSPECIFIED WHETHER MANIFESTATIONS FLUCTUATE: ICD-10-CM

## 2025-07-23 DIAGNOSIS — K59.00 CONSTIPATION, UNSPECIFIED CONSTIPATION TYPE: ICD-10-CM

## 2025-07-23 DIAGNOSIS — Z78.9 UNABLE TO CARE FOR SELF: ICD-10-CM

## 2025-07-23 LAB
ALBUMIN SERPL BCP-MCNC: 4.5 G/DL (ref 3.4–5)
ALP SERPL-CCNC: 54 U/L (ref 33–136)
ALT SERPL W P-5'-P-CCNC: 16 U/L (ref 7–45)
ANION GAP SERPL CALC-SCNC: 14 MMOL/L (ref 10–20)
APPEARANCE UR: CLEAR
APTT PPP: 45 SECONDS (ref 26–36)
AST SERPL W P-5'-P-CCNC: 17 U/L (ref 9–39)
BASOPHILS # BLD AUTO: 0.02 X10*3/UL (ref 0–0.1)
BASOPHILS NFR BLD AUTO: 0.3 %
BILIRUB SERPL-MCNC: 0.4 MG/DL (ref 0–1.2)
BILIRUB UR STRIP.AUTO-MCNC: NEGATIVE MG/DL
BNP SERPL-MCNC: 58 PG/ML (ref 0–99)
BUN SERPL-MCNC: 21 MG/DL (ref 6–23)
CALCIUM SERPL-MCNC: 9.6 MG/DL (ref 8.6–10.3)
CARDIAC TROPONIN I PNL SERPL HS: 11 NG/L (ref 0–13)
CARDIAC TROPONIN I PNL SERPL HS: 9 NG/L (ref 0–13)
CHLORIDE SERPL-SCNC: 104 MMOL/L (ref 98–107)
CO2 SERPL-SCNC: 26 MMOL/L (ref 21–32)
COLOR UR: COLORLESS
CREAT SERPL-MCNC: 0.9 MG/DL (ref 0.5–1.05)
EGFRCR SERPLBLD CKD-EPI 2021: 66 ML/MIN/1.73M*2
EOSINOPHIL # BLD AUTO: 0.09 X10*3/UL (ref 0–0.4)
EOSINOPHIL NFR BLD AUTO: 1.1 %
ERYTHROCYTE [DISTWIDTH] IN BLOOD BY AUTOMATED COUNT: 12.4 % (ref 11.5–14.5)
FLUAV RNA RESP QL NAA+PROBE: NOT DETECTED
FLUBV RNA RESP QL NAA+PROBE: NOT DETECTED
GLUCOSE SERPL-MCNC: 104 MG/DL (ref 74–99)
GLUCOSE UR STRIP.AUTO-MCNC: NORMAL MG/DL
HCT VFR BLD AUTO: 38.1 % (ref 36–46)
HGB BLD-MCNC: 12.5 G/DL (ref 12–16)
IMM GRANULOCYTES # BLD AUTO: 0.01 X10*3/UL (ref 0–0.5)
IMM GRANULOCYTES NFR BLD AUTO: 0.1 % (ref 0–0.9)
INR PPP: 0.9 (ref 0.9–1.1)
KETONES UR STRIP.AUTO-MCNC: NEGATIVE MG/DL
LACTATE SERPL-SCNC: 1.1 MMOL/L (ref 0.4–2)
LEUKOCYTE ESTERASE UR QL STRIP.AUTO: NEGATIVE
LYMPHOCYTES # BLD AUTO: 1.14 X10*3/UL (ref 0.8–3)
LYMPHOCYTES NFR BLD AUTO: 14.4 %
MAGNESIUM SERPL-MCNC: 2.07 MG/DL (ref 1.6–2.4)
MCH RBC QN AUTO: 31 PG (ref 26–34)
MCHC RBC AUTO-ENTMCNC: 32.8 G/DL (ref 32–36)
MCV RBC AUTO: 95 FL (ref 80–100)
MONOCYTES # BLD AUTO: 0.66 X10*3/UL (ref 0.05–0.8)
MONOCYTES NFR BLD AUTO: 8.4 %
NEUTROPHILS # BLD AUTO: 5.97 X10*3/UL (ref 1.6–5.5)
NEUTROPHILS NFR BLD AUTO: 75.7 %
NITRITE UR QL STRIP.AUTO: NEGATIVE
NRBC BLD-RTO: 0 /100 WBCS (ref 0–0)
PH UR STRIP.AUTO: 7 [PH]
PLATELET # BLD AUTO: 222 X10*3/UL (ref 150–450)
POTASSIUM SERPL-SCNC: 3.9 MMOL/L (ref 3.5–5.3)
PROT SERPL-MCNC: 7.1 G/DL (ref 6.4–8.2)
PROT UR STRIP.AUTO-MCNC: NEGATIVE MG/DL
PROTHROMBIN TIME: 10.2 SECONDS (ref 9.8–12.4)
RBC # BLD AUTO: 4.03 X10*6/UL (ref 4–5.2)
RBC # UR STRIP.AUTO: NEGATIVE MG/DL
RSV RNA RESP QL NAA+PROBE: NOT DETECTED
SARS-COV-2 RNA RESP QL NAA+PROBE: NOT DETECTED
SODIUM SERPL-SCNC: 140 MMOL/L (ref 136–145)
SP GR UR STRIP.AUTO: 1.01
UROBILINOGEN UR STRIP.AUTO-MCNC: NORMAL MG/DL
WBC # BLD AUTO: 7.9 X10*3/UL (ref 4.4–11.3)

## 2025-07-23 PROCEDURE — 96360 HYDRATION IV INFUSION INIT: CPT

## 2025-07-23 PROCEDURE — 80053 COMPREHEN METABOLIC PANEL: CPT | Performed by: NURSE PRACTITIONER

## 2025-07-23 PROCEDURE — 36415 COLL VENOUS BLD VENIPUNCTURE: CPT | Performed by: NURSE PRACTITIONER

## 2025-07-23 PROCEDURE — 71046 X-RAY EXAM CHEST 2 VIEWS: CPT

## 2025-07-23 PROCEDURE — 99285 EMERGENCY DEPT VISIT HI MDM: CPT | Mod: 25 | Performed by: STUDENT IN AN ORGANIZED HEALTH CARE EDUCATION/TRAINING PROGRAM

## 2025-07-23 PROCEDURE — 85025 COMPLETE CBC W/AUTO DIFF WBC: CPT | Performed by: NURSE PRACTITIONER

## 2025-07-23 PROCEDURE — 74176 CT ABD & PELVIS W/O CONTRAST: CPT

## 2025-07-23 PROCEDURE — 85610 PROTHROMBIN TIME: CPT | Performed by: NURSE PRACTITIONER

## 2025-07-23 PROCEDURE — 71046 X-RAY EXAM CHEST 2 VIEWS: CPT | Performed by: RADIOLOGY

## 2025-07-23 PROCEDURE — 2500000004 HC RX 250 GENERAL PHARMACY W/ HCPCS (ALT 636 FOR OP/ED): Performed by: NURSE PRACTITIONER

## 2025-07-23 PROCEDURE — 70450 CT HEAD/BRAIN W/O DYE: CPT

## 2025-07-23 PROCEDURE — 93005 ELECTROCARDIOGRAM TRACING: CPT

## 2025-07-23 PROCEDURE — 83735 ASSAY OF MAGNESIUM: CPT | Performed by: NURSE PRACTITIONER

## 2025-07-23 PROCEDURE — 70450 CT HEAD/BRAIN W/O DYE: CPT | Performed by: RADIOLOGY

## 2025-07-23 PROCEDURE — 74176 CT ABD & PELVIS W/O CONTRAST: CPT | Mod: FOREIGN READ | Performed by: STUDENT IN AN ORGANIZED HEALTH CARE EDUCATION/TRAINING PROGRAM

## 2025-07-23 PROCEDURE — 81003 URINALYSIS AUTO W/O SCOPE: CPT | Performed by: NURSE PRACTITIONER

## 2025-07-23 PROCEDURE — 76705 ECHO EXAM OF ABDOMEN: CPT

## 2025-07-23 PROCEDURE — 83605 ASSAY OF LACTIC ACID: CPT | Performed by: NURSE PRACTITIONER

## 2025-07-23 PROCEDURE — 83880 ASSAY OF NATRIURETIC PEPTIDE: CPT | Performed by: NURSE PRACTITIONER

## 2025-07-23 PROCEDURE — 87637 SARSCOV2&INF A&B&RSV AMP PRB: CPT | Performed by: NURSE PRACTITIONER

## 2025-07-23 PROCEDURE — 85730 THROMBOPLASTIN TIME PARTIAL: CPT | Performed by: NURSE PRACTITIONER

## 2025-07-23 PROCEDURE — 84484 ASSAY OF TROPONIN QUANT: CPT | Performed by: NURSE PRACTITIONER

## 2025-07-23 RX ORDER — LACTULOSE 10 G/15ML
20 SOLUTION ORAL ONCE
Status: COMPLETED | OUTPATIENT
Start: 2025-07-23 | End: 2025-07-24

## 2025-07-23 RX ADMIN — SODIUM CHLORIDE 500 ML: 0.9 INJECTION, SOLUTION INTRAVENOUS at 18:32

## 2025-07-23 ASSESSMENT — LIFESTYLE VARIABLES
EVER HAD A DRINK FIRST THING IN THE MORNING TO STEADY YOUR NERVES TO GET RID OF A HANGOVER: NO
EVER FELT BAD OR GUILTY ABOUT YOUR DRINKING: NO
HAVE YOU EVER FELT YOU SHOULD CUT DOWN ON YOUR DRINKING: NO
HAVE PEOPLE ANNOYED YOU BY CRITICIZING YOUR DRINKING: NO
TOTAL SCORE: 0

## 2025-07-23 ASSESSMENT — PAIN - FUNCTIONAL ASSESSMENT: PAIN_FUNCTIONAL_ASSESSMENT: 0-10

## 2025-07-23 ASSESSMENT — PAIN SCALES - GENERAL
PAINLEVEL_OUTOF10: 0 - NO PAIN
PAINLEVEL_OUTOF10: 0 - NO PAIN

## 2025-07-23 NOTE — ED TRIAGE NOTES
Patient here for general weakness States she was unable to get out of bed today due to weakness. Denies pain anywhere. States she is a DNR- CCPatient has parkinsons

## 2025-07-24 ENCOUNTER — APPOINTMENT (OUTPATIENT)
Dept: PRIMARY CARE | Facility: CLINIC | Age: 80
End: 2025-07-24
Payer: MEDICARE

## 2025-07-24 PROBLEM — R53.1 GENERALIZED WEAKNESS: Status: ACTIVE | Noted: 2025-07-24

## 2025-07-24 LAB
ALBUMIN SERPL BCP-MCNC: 3.6 G/DL (ref 3.4–5)
ANION GAP SERPL CALC-SCNC: 11 MMOL/L (ref 10–20)
ATRIAL RATE: 105 BPM
BUN SERPL-MCNC: 25 MG/DL (ref 6–23)
CALCIUM SERPL-MCNC: 8.8 MG/DL (ref 8.6–10.3)
CHLORIDE SERPL-SCNC: 108 MMOL/L (ref 98–107)
CO2 SERPL-SCNC: 26 MMOL/L (ref 21–32)
CREAT SERPL-MCNC: 0.89 MG/DL (ref 0.5–1.05)
EGFRCR SERPLBLD CKD-EPI 2021: 66 ML/MIN/1.73M*2
ERYTHROCYTE [DISTWIDTH] IN BLOOD BY AUTOMATED COUNT: 12.5 % (ref 11.5–14.5)
GLUCOSE SERPL-MCNC: 88 MG/DL (ref 74–99)
HCT VFR BLD AUTO: 34.5 % (ref 36–46)
HGB BLD-MCNC: 10.9 G/DL (ref 12–16)
HOLD SPECIMEN: NORMAL
MAGNESIUM SERPL-MCNC: 2.06 MG/DL (ref 1.6–2.4)
MCH RBC QN AUTO: 30.7 PG (ref 26–34)
MCHC RBC AUTO-ENTMCNC: 31.6 G/DL (ref 32–36)
MCV RBC AUTO: 97 FL (ref 80–100)
NRBC BLD-RTO: 0 /100 WBCS (ref 0–0)
P AXIS: 99 DEGREES
P OFFSET: 213 MS
P ONSET: 168 MS
PHOSPHATE SERPL-MCNC: 4 MG/DL (ref 2.5–4.9)
PLATELET # BLD AUTO: 193 X10*3/UL (ref 150–450)
POTASSIUM SERPL-SCNC: 3.8 MMOL/L (ref 3.5–5.3)
PR INTERVAL: 126 MS
Q ONSET: 231 MS
QRS COUNT: 17 BEATS
QRS DURATION: 64 MS
QT INTERVAL: 328 MS
QTC CALCULATION(BAZETT): 433 MS
QTC FREDERICIA: 395 MS
R AXIS: -85 DEGREES
RBC # BLD AUTO: 3.55 X10*6/UL (ref 4–5.2)
SODIUM SERPL-SCNC: 141 MMOL/L (ref 136–145)
T AXIS: 76 DEGREES
T OFFSET: 395 MS
VENTRICULAR RATE: 105 BPM
WBC # BLD AUTO: 5.2 X10*3/UL (ref 4.4–11.3)

## 2025-07-24 PROCEDURE — 2500000001 HC RX 250 WO HCPCS SELF ADMINISTERED DRUGS (ALT 637 FOR MEDICARE OP)

## 2025-07-24 PROCEDURE — 2500000004 HC RX 250 GENERAL PHARMACY W/ HCPCS (ALT 636 FOR OP/ED)

## 2025-07-24 PROCEDURE — 97165 OT EVAL LOW COMPLEX 30 MIN: CPT | Mod: GO

## 2025-07-24 PROCEDURE — G0378 HOSPITAL OBSERVATION PER HR: HCPCS

## 2025-07-24 PROCEDURE — 97161 PT EVAL LOW COMPLEX 20 MIN: CPT | Mod: GP

## 2025-07-24 PROCEDURE — 76705 ECHO EXAM OF ABDOMEN: CPT | Performed by: STUDENT IN AN ORGANIZED HEALTH CARE EDUCATION/TRAINING PROGRAM

## 2025-07-24 PROCEDURE — 83735 ASSAY OF MAGNESIUM: CPT

## 2025-07-24 PROCEDURE — 96372 THER/PROPH/DIAG INJ SC/IM: CPT

## 2025-07-24 PROCEDURE — 36415 COLL VENOUS BLD VENIPUNCTURE: CPT

## 2025-07-24 PROCEDURE — 85027 COMPLETE CBC AUTOMATED: CPT

## 2025-07-24 PROCEDURE — 80069 RENAL FUNCTION PANEL: CPT

## 2025-07-24 PROCEDURE — 97530 THERAPEUTIC ACTIVITIES: CPT | Mod: GO

## 2025-07-24 PROCEDURE — 2500000001 HC RX 250 WO HCPCS SELF ADMINISTERED DRUGS (ALT 637 FOR MEDICARE OP): Performed by: NURSE PRACTITIONER

## 2025-07-24 PROCEDURE — 99223 1ST HOSP IP/OBS HIGH 75: CPT

## 2025-07-24 PROCEDURE — 94760 N-INVAS EAR/PLS OXIMETRY 1: CPT

## 2025-07-24 RX ORDER — AMOXICILLIN 250 MG
1 CAPSULE ORAL NIGHTLY
Status: DISCONTINUED | OUTPATIENT
Start: 2025-07-24 | End: 2025-07-29 | Stop reason: HOSPADM

## 2025-07-24 RX ORDER — SYRING-NEEDL,DISP,INSUL,0.3 ML 29 G X1/2"
296 SYRINGE, EMPTY DISPOSABLE MISCELLANEOUS ONCE
Status: COMPLETED | OUTPATIENT
Start: 2025-07-24 | End: 2025-07-24

## 2025-07-24 RX ORDER — BISMUTH SUBSALICYLATE 262 MG
1 TABLET,CHEWABLE ORAL DAILY
COMMUNITY

## 2025-07-24 RX ORDER — RASAGILINE 0.5 MG/1
0.5 TABLET ORAL DAILY
Status: DISCONTINUED | OUTPATIENT
Start: 2025-07-24 | End: 2025-07-24 | Stop reason: RX

## 2025-07-24 RX ORDER — AMLODIPINE BESYLATE 5 MG/1
5 TABLET ORAL DAILY
Status: DISCONTINUED | OUTPATIENT
Start: 2025-07-24 | End: 2025-07-29 | Stop reason: HOSPADM

## 2025-07-24 RX ORDER — KETOTIFEN FUMARATE 0.35 MG/ML
1 SOLUTION/ DROPS OPHTHALMIC 2 TIMES DAILY
Status: DISCONTINUED | OUTPATIENT
Start: 2025-07-24 | End: 2025-07-29 | Stop reason: HOSPADM

## 2025-07-24 RX ORDER — ENOXAPARIN SODIUM 100 MG/ML
40 INJECTION SUBCUTANEOUS DAILY
Status: DISCONTINUED | OUTPATIENT
Start: 2025-07-24 | End: 2025-07-29 | Stop reason: HOSPADM

## 2025-07-24 RX ORDER — POLYETHYLENE GLYCOL 3350 17 G/17G
17 POWDER, FOR SOLUTION ORAL DAILY
Status: DISCONTINUED | OUTPATIENT
Start: 2025-07-24 | End: 2025-07-29 | Stop reason: HOSPADM

## 2025-07-24 RX ORDER — RASAGILINE 0.5 MG/1
0.5 TABLET ORAL DAILY
Status: DISCONTINUED | OUTPATIENT
Start: 2025-07-24 | End: 2025-07-29 | Stop reason: HOSPADM

## 2025-07-24 RX ORDER — ACETAMINOPHEN 325 MG/1
650 TABLET ORAL EVERY 6 HOURS PRN
Status: DISCONTINUED | OUTPATIENT
Start: 2025-07-24 | End: 2025-07-29 | Stop reason: HOSPADM

## 2025-07-24 RX ORDER — FLUTICASONE PROPIONATE 50 MCG
2 SPRAY, SUSPENSION (ML) NASAL DAILY
Status: DISCONTINUED | OUTPATIENT
Start: 2025-07-24 | End: 2025-07-24

## 2025-07-24 RX ADMIN — ACETAMINOPHEN 650 MG: 325 TABLET ORAL at 12:22

## 2025-07-24 RX ADMIN — AMLODIPINE BESYLATE 5 MG: 5 TABLET ORAL at 09:31

## 2025-07-24 RX ADMIN — KETOTIFEN FUMARATE 1 DROP: 0.25 SOLUTION/ DROPS OPHTHALMIC at 09:31

## 2025-07-24 RX ADMIN — KETOTIFEN FUMARATE 1 DROP: 0.25 SOLUTION/ DROPS OPHTHALMIC at 20:49

## 2025-07-24 RX ADMIN — ENOXAPARIN SODIUM 40 MG: 100 INJECTION SUBCUTANEOUS at 09:31

## 2025-07-24 RX ADMIN — MAJOR MAGNESIUM CITRATE ORAL SOLUTION - LEMON 296 ML: 1.75 LIQUID ORAL at 16:51

## 2025-07-24 RX ADMIN — LACTULOSE 20 G: 10 SOLUTION ORAL at 00:57

## 2025-07-24 RX ADMIN — POLYETHYLENE GLYCOL 3350 17 G: 17 POWDER, FOR SOLUTION ORAL at 09:31

## 2025-07-24 RX ADMIN — SENNOSIDES AND DOCUSATE SODIUM 1 TABLET: 50; 8.6 TABLET ORAL at 01:57

## 2025-07-24 SDOH — ECONOMIC STABILITY: FOOD INSECURITY: WITHIN THE PAST 12 MONTHS, THE FOOD YOU BOUGHT JUST DIDN'T LAST AND YOU DIDN'T HAVE MONEY TO GET MORE.: NEVER TRUE

## 2025-07-24 SDOH — SOCIAL STABILITY: SOCIAL INSECURITY: HAS ANYONE EVER THREATENED TO HURT YOUR FAMILY OR YOUR PETS?: NO

## 2025-07-24 SDOH — ECONOMIC STABILITY: HOUSING INSECURITY: IN THE PAST 12 MONTHS, HOW MANY TIMES HAVE YOU MOVED WHERE YOU WERE LIVING?: 0

## 2025-07-24 SDOH — SOCIAL STABILITY: SOCIAL INSECURITY
WITHIN THE LAST YEAR, HAVE YOU BEEN RAPED OR FORCED TO HAVE ANY KIND OF SEXUAL ACTIVITY BY YOUR PARTNER OR EX-PARTNER?: NO

## 2025-07-24 SDOH — ECONOMIC STABILITY: HOUSING INSECURITY: AT ANY TIME IN THE PAST 12 MONTHS, WERE YOU HOMELESS OR LIVING IN A SHELTER (INCLUDING NOW)?: NO

## 2025-07-24 SDOH — SOCIAL STABILITY: SOCIAL INSECURITY
WITHIN THE LAST YEAR, HAVE YOU BEEN KICKED, HIT, SLAPPED, OR OTHERWISE PHYSICALLY HURT BY YOUR PARTNER OR EX-PARTNER?: NO

## 2025-07-24 SDOH — SOCIAL STABILITY: SOCIAL INSECURITY: WITHIN THE LAST YEAR, HAVE YOU BEEN HUMILIATED OR EMOTIONALLY ABUSED IN OTHER WAYS BY YOUR PARTNER OR EX-PARTNER?: NO

## 2025-07-24 SDOH — SOCIAL STABILITY: SOCIAL INSECURITY: WITHIN THE LAST YEAR, HAVE YOU BEEN AFRAID OF YOUR PARTNER OR EX-PARTNER?: NO

## 2025-07-24 SDOH — ECONOMIC STABILITY: TRANSPORTATION INSECURITY: IN THE PAST 12 MONTHS, HAS LACK OF TRANSPORTATION KEPT YOU FROM MEDICAL APPOINTMENTS OR FROM GETTING MEDICATIONS?: NO

## 2025-07-24 SDOH — ECONOMIC STABILITY: FOOD INSECURITY: WITHIN THE PAST 12 MONTHS, YOU WORRIED THAT YOUR FOOD WOULD RUN OUT BEFORE YOU GOT THE MONEY TO BUY MORE.: NEVER TRUE

## 2025-07-24 SDOH — ECONOMIC STABILITY: HOUSING INSECURITY: IN THE LAST 12 MONTHS, WAS THERE A TIME WHEN YOU WERE NOT ABLE TO PAY THE MORTGAGE OR RENT ON TIME?: NO

## 2025-07-24 SDOH — ECONOMIC STABILITY: INCOME INSECURITY: IN THE PAST 12 MONTHS HAS THE ELECTRIC, GAS, OIL, OR WATER COMPANY THREATENED TO SHUT OFF SERVICES IN YOUR HOME?: NO

## 2025-07-24 SDOH — ECONOMIC STABILITY: FOOD INSECURITY: HOW HARD IS IT FOR YOU TO PAY FOR THE VERY BASICS LIKE FOOD, HOUSING, MEDICAL CARE, AND HEATING?: NOT VERY HARD

## 2025-07-24 SDOH — SOCIAL STABILITY: SOCIAL INSECURITY: HAVE YOU HAD THOUGHTS OF HARMING ANYONE ELSE?: NO

## 2025-07-24 SDOH — SOCIAL STABILITY: SOCIAL INSECURITY: DO YOU FEEL ANYONE HAS EXPLOITED OR TAKEN ADVANTAGE OF YOU FINANCIALLY OR OF YOUR PERSONAL PROPERTY?: NO

## 2025-07-24 SDOH — SOCIAL STABILITY: SOCIAL INSECURITY: HAVE YOU HAD ANY THOUGHTS OF HARMING ANYONE ELSE?: NO

## 2025-07-24 SDOH — SOCIAL STABILITY: SOCIAL INSECURITY: WERE YOU ABLE TO COMPLETE ALL THE BEHAVIORAL HEALTH SCREENINGS?: YES

## 2025-07-24 SDOH — SOCIAL STABILITY: SOCIAL INSECURITY: ABUSE: ADULT

## 2025-07-24 SDOH — SOCIAL STABILITY: SOCIAL INSECURITY: DOES ANYONE TRY TO KEEP YOU FROM HAVING/CONTACTING OTHER FRIENDS OR DOING THINGS OUTSIDE YOUR HOME?: NO

## 2025-07-24 SDOH — SOCIAL STABILITY: SOCIAL INSECURITY: ARE YOU OR HAVE YOU BEEN THREATENED OR ABUSED PHYSICALLY, EMOTIONALLY, OR SEXUALLY BY ANYONE?: NO

## 2025-07-24 SDOH — SOCIAL STABILITY: SOCIAL INSECURITY: DO YOU FEEL UNSAFE GOING BACK TO THE PLACE WHERE YOU ARE LIVING?: NO

## 2025-07-24 SDOH — SOCIAL STABILITY: SOCIAL INSECURITY: ARE THERE ANY APPARENT SIGNS OF INJURIES/BEHAVIORS THAT COULD BE RELATED TO ABUSE/NEGLECT?: NO

## 2025-07-24 ASSESSMENT — ACTIVITIES OF DAILY LIVING (ADL)
DRESSING YOURSELF: NEEDS ASSISTANCE
WALKS IN HOME: NEEDS ASSISTANCE
TOILETING: NEEDS ASSISTANCE
ASSISTIVE_DEVICE: WALKER;WHEELCHAIR;EYEGLASSES
LACK_OF_TRANSPORTATION: NO
BATHING: NEEDS ASSISTANCE
ADL_ASSISTANCE: NEEDS ASSISTANCE
PATIENT'S MEMORY ADEQUATE TO SAFELY COMPLETE DAILY ACTIVITIES?: YES
GROOMING: NEEDS ASSISTANCE
HEARING - LEFT EAR: FUNCTIONAL
ADLS_ADDRESSED: NO
JUDGMENT_ADEQUATE_SAFELY_COMPLETE_DAILY_ACTIVITIES: YES
LACK_OF_TRANSPORTATION: NO
FEEDING YOURSELF: NEEDS ASSISTANCE
ADEQUATE_TO_COMPLETE_ADL: YES
LACK_OF_TRANSPORTATION: NO
ADL_ASSISTANCE: INDEPENDENT
HEARING - RIGHT EAR: FUNCTIONAL
BATHING_ASSISTANCE: MODERATE

## 2025-07-24 ASSESSMENT — COGNITIVE AND FUNCTIONAL STATUS - GENERAL
DRESSING REGULAR UPPER BODY CLOTHING: A LITTLE
MOVING TO AND FROM BED TO CHAIR: A LITTLE
EATING MEALS: A LITTLE
PERSONAL GROOMING: A LITTLE
WALKING IN HOSPITAL ROOM: A LITTLE
MOBILITY SCORE: 15
CLIMB 3 TO 5 STEPS WITH RAILING: A LITTLE
MOVING TO AND FROM BED TO CHAIR: TOTAL
STANDING UP FROM CHAIR USING ARMS: A LITTLE
TURNING FROM BACK TO SIDE WHILE IN FLAT BAD: TOTAL
DRESSING REGULAR LOWER BODY CLOTHING: A LITTLE
MOBILITY SCORE: 18
TOILETING: A LITTLE
MOVING TO AND FROM BED TO CHAIR: A LITTLE
TOILETING: TOTAL
DRESSING REGULAR LOWER BODY CLOTHING: TOTAL
DRESSING REGULAR UPPER BODY CLOTHING: A LITTLE
MOVING FROM LYING ON BACK TO SITTING ON SIDE OF FLAT BED WITH BEDRAILS: TOTAL
MOVING FROM LYING ON BACK TO SITTING ON SIDE OF FLAT BED WITH BEDRAILS: A LITTLE
WALKING IN HOSPITAL ROOM: A LITTLE
PERSONAL GROOMING: A LITTLE
HELP NEEDED FOR BATHING: A LITTLE
TURNING FROM BACK TO SIDE WHILE IN FLAT BAD: A LITTLE
DRESSING REGULAR UPPER BODY CLOTHING: A LITTLE
DAILY ACTIVITIY SCORE: 18
DRESSING REGULAR LOWER BODY CLOTHING: A LITTLE
EATING MEALS: A LITTLE
MOVING FROM LYING ON BACK TO SITTING ON SIDE OF FLAT BED WITH BEDRAILS: A LITTLE
CLIMB 3 TO 5 STEPS WITH RAILING: A LOT
HELP NEEDED FOR BATHING: A LITTLE
STANDING UP FROM CHAIR USING ARMS: A LITTLE
DRESSING REGULAR LOWER BODY CLOTHING: A LOT
MOVING TO AND FROM BED TO CHAIR: A LITTLE
TURNING FROM BACK TO SIDE WHILE IN FLAT BAD: A LITTLE
EATING MEALS: A LITTLE
DRESSING REGULAR UPPER BODY CLOTHING: A LITTLE
TOILETING: A LITTLE
TOILETING: A LITTLE
EATING MEALS: A LITTLE
MOVING FROM LYING ON BACK TO SITTING ON SIDE OF FLAT BED WITH BEDRAILS: A LITTLE
WALKING IN HOSPITAL ROOM: A LOT
HELP NEEDED FOR BATHING: A LOT
DAILY ACTIVITIY SCORE: 13
HELP NEEDED FOR BATHING: A LOT
DAILY ACTIVITIY SCORE: 18
PATIENT BASELINE BEDBOUND: NO
PERSONAL GROOMING: A LITTLE
STANDING UP FROM CHAIR USING ARMS: A LOT
WALKING IN HOSPITAL ROOM: TOTAL
CLIMB 3 TO 5 STEPS WITH RAILING: TOTAL
CLIMB 3 TO 5 STEPS WITH RAILING: A LOT
TURNING FROM BACK TO SIDE WHILE IN FLAT BAD: A LITTLE
MOBILITY SCORE: 17
DAILY ACTIVITIY SCORE: 16
PERSONAL GROOMING: A LITTLE
STANDING UP FROM CHAIR USING ARMS: TOTAL
MOBILITY SCORE: 6

## 2025-07-24 ASSESSMENT — PATIENT HEALTH QUESTIONNAIRE - PHQ9
2. FEELING DOWN, DEPRESSED OR HOPELESS: NOT AT ALL
SUM OF ALL RESPONSES TO PHQ9 QUESTIONS 1 & 2: 0
1. LITTLE INTEREST OR PLEASURE IN DOING THINGS: NOT AT ALL

## 2025-07-24 ASSESSMENT — LIFESTYLE VARIABLES
HOW MANY STANDARD DRINKS CONTAINING ALCOHOL DO YOU HAVE ON A TYPICAL DAY: PATIENT DOES NOT DRINK
AUDIT-C TOTAL SCORE: 0
AUDIT-C TOTAL SCORE: 0
HOW OFTEN DO YOU HAVE A DRINK CONTAINING ALCOHOL: NEVER
SKIP TO QUESTIONS 9-10: 1
HOW OFTEN DO YOU HAVE 6 OR MORE DRINKS ON ONE OCCASION: NEVER

## 2025-07-24 ASSESSMENT — PAIN DESCRIPTION - ORIENTATION: ORIENTATION: LOWER

## 2025-07-24 ASSESSMENT — PAIN SCALES - GENERAL
PAINLEVEL_OUTOF10: 6
PAINLEVEL_OUTOF10: 0 - NO PAIN
PAINLEVEL_OUTOF10: 6
PAINLEVEL_OUTOF10: 3

## 2025-07-24 ASSESSMENT — PAIN - FUNCTIONAL ASSESSMENT
PAIN_FUNCTIONAL_ASSESSMENT: 0-10

## 2025-07-24 ASSESSMENT — PAIN DESCRIPTION - LOCATION: LOCATION: BACK

## 2025-07-24 ASSESSMENT — PAIN DESCRIPTION - DESCRIPTORS: DESCRIPTORS: ACHING

## 2025-07-24 NOTE — PROGRESS NOTES
07/24/25 1309   Discharge Planning   Living Arrangements Alone   Support Systems Family members;Friends/neighbors;Home care staff  (Patient has a private duty HHA on Mondays/2 hours and Tuesdays/3 hours.)   Assistance Needed Alert and oriented x 4, Independent as much as possible, Doesn't drive(family and HHA transports), Walker, Wheel chair, Grab bars, Bulit in shower chair, Raised toilet, Room air at baseline and currently, No CPAP/Bipap, PCP-Dr. Tenzin Waldrop, DO   Type of Residence Private residence  (Mobile Home [place] mobile home park)   Number of Stairs to Enter Residence 0  (Ramp)   Number of Stairs Within Residence 0   Do you have animals or pets at home? No   Who is requesting discharge planning? Provider   Home or Post Acute Services Other (Comment)  (TBD, PT/OT evaluations pending at this time, patient states that she has been to [facility] in the past and has had home health care in the past as well, will await recommendations from PT/OT evaluations.)   Expected Discharge Disposition Home  (TBD, PT/OT evaluations pending at this time, patient states that she has been to [facility] in the past and has had home health care in the past as well, will await recommendations from PT/OT evaluations.)   Does the patient need discharge transport arranged? No   Financial Resource Strain   How hard is it for you to pay for the very basics like food, housing, medical care, and heating? Not very   Housing Stability   In the last 12 months, was there a time when you were not able to pay the mortgage or rent on time? N   In the past 12 months, how many times have you moved where you were living? 0   At any time in the past 12 months, were you homeless or living in a shelter (including now)? N   Transportation Needs   In the past 12 months, has lack of transportation kept you from medical appointments or from getting medications? no   In the past 12 months, has lack of transportation kept you from meetings, work, or  from getting things needed for daily living? No   Patient Choice   Provider Choice list and CMS website (https://medicare.gov/care-compare#search) for post-acute Quality and Resource Measure Data were provided and reviewed with: Family;Patient   Patient / Family choosing to utilize agency / facility established prior to hospitalization Yes   Stroke Family Assessment   Stroke Family Assessment Needed No   Intensity of Service   Intensity of Service 0-30 min

## 2025-07-24 NOTE — CONSULTS
"Nutrition Initial Assessment:   Nutrition Assessment    Reason for Assessment: Admission nursing screening (RN Discretion)    Patient is a 80 y.o. female with h/o Parkinson's Disease presenting with weakness & inability to get out of bed.    Workup thus far unremarkable. Pt will likely need placement.     Medical History[1]    Nutrition History:  Food and Nutrient History: Visit made, pt sitting up in chair at that time. Reports prior to admission she was consuming her usual x3 meals/day + snacks. Notes slight decline in appetite over the past few weeks but still eating >75% usual intake. Admits to increased consumption of \"junk food\". Able to tolerate 100% of breakfast without issue this AM. Reports she used to drink Ensure but it gave her diarrhea so she stopped. Amenable to trial of Mighty Shakes this admission. Pt reports inaccurate allergies including bananas, peanuts, shellfish, wheat, soy, & eggs all of which she consumes without issue. Discussed plan to remove inaccurate allergies to provide more options at meal times. No additional needs noted currently.  Food Allergy:  (Tuna oil)     Anthropometrics:  Height: 162.6 cm (5' 4\")   Weight: 47.4 kg (104 lb 8 oz)   BMI (Calculated): 17.93  IBW/kg (Dietitian Calculated): 54.5 kg  Percent of IBW: 87 %    Weight History:   Wt Readings from Last 50 Encounters:   07/24/25 47.4 kg (104 lb 8 oz) --> Admit wt   01/23/25 51.3 kg (113 lb)   09/19/24 49.3 kg (108 lb 9.6 oz)   07/11/24 50.3 kg (111 lb)   05/03/24 52.2 kg (115 lb)     Weight Change %:  Weight History / % Weight Change: 8% x 6 months  Significant Weight Loss: No    Nutrition Focused Physical Exam Findings:  Defer: Bedside RN administering medications at that time    Edema:  Edema: none    Physical Findings:  Skin:  (Intact)  Digestive System Findings: Constipation  Mouth Findings:  (Reports she drinks from a straw d/t difficulty sipping from cups)    Nutrition Significant Labs:  BMP Trend:   Results from last " 7 days   Lab Units 07/24/25  0627 07/23/25  1734   GLUCOSE mg/dL 88 104*   CALCIUM mg/dL 8.8 9.6   SODIUM mmol/L 141 140   POTASSIUM mmol/L 3.8 3.9   CO2 mmol/L 26 26   CHLORIDE mmol/L 108* 104   BUN mg/dL 25* 21   CREATININE mg/dL 0.89 0.90      Nutrition Specific Medications:  Scheduled medications  Scheduled Medications[2]  Continuous medications  Continuous Medications[3]  PRN medications  PRN Medications[4]    I/O:   Last BM Date: 07/22/25    Dietary Orders (From admission, onward)       Start     Ordered    07/24/25 0942  May Participate in Room Service With Assistance  Once        Question:  .  Answer:  Yes    07/24/25 0942 07/23/25 1748  Adult diet Regular  Diet effective now        Question:  Diet type  Answer:  Regular    07/23/25 1747             Estimated Needs:   Total Energy Estimated Needs in 24 hours (kCal):  (2356-8002)  Method for Estimating Needs: 30-35 kcals/kg x CBW (47.4 kg)  Total Protein Estimated Needs in 24 Hours (g):  (60-70)  Method for Estimating 24 Hour Protein Needs: 1.3-1.5 g/kg x CBW (47.4 kg)  Total Fluid Estimated Needs in 24 Hours (mL):  (8002-7479)  Method for Estimating 24 Hour Fluid Needs: 1mL/kcal or per team        Nutrition Diagnosis     Nutrition Diagnosis  Patient has Nutrition Diagnosis: Yes  Diagnosis Status (1): New  Nutrition Diagnosis 1: Underweight  Related to (1): Parkinson's Disease  As Evidenced by (1): BMI of 17.9 on admission       Nutrition Interventions/Recommendations      Nutrition Recommendations:  Continue liberalized regular diet as tolerated    Nutrition Interventions/Goals:   Medical Food Supplement: Commercial beverage medical food supplement therapy  Goal: Mighty Shakes BID (220 kcals/6g protein each)      Nutrition Monitoring and Evaluation   Estimated Energy Intake: Energy intake greater or equal to 75% of estimated energy needs    Time Spent (min): 60 minutes              [1]   Past Medical History:  Diagnosis Date    Acute bronchitis due to  other specified organisms 01/15/2020    Acute bronchitis due to other specified organisms    Acute maxillary sinusitis, unspecified 10/10/2017    Acute non-recurrent maxillary sinusitis    Bitten or stung by nonvenomous insect and other nonvenomous arthropods, initial encounter 07/12/2016    Bedbug bite, initial encounter    Cervicalgia 02/28/2023    Closed compression fracture of thoracic vertebra (Multi) 02/28/2023    Encounter for screening for cardiovascular disorders 04/11/2016    Encounter for screening for cardiovascular disorders    Encounter for screening for diabetes mellitus 04/11/2016    Encounter for screening for diabetes mellitus    Encounter for screening for lipoid disorders 04/11/2016    Encounter for screening for lipoid disorders    Encounter for screening for osteoporosis 03/30/2021    Osteoporosis screening    Enteroviral vesicular pharyngitis 08/19/2015    Herpangina    Facial droop 09/07/2023    Gastroduodenitis 09/07/2023    Impacted cerumen, left ear 06/19/2018    Impacted cerumen of left ear    Impacted cerumen, right ear 08/29/2018    Impacted cerumen of right ear    Localized enlarged lymph nodes 10/30/2019    Cervical lymphadenopathy    Localized enlarged lymph nodes 11/30/2016    Lymphadenopathy, inguinal    Localized swelling, mass and lump, head 09/23/2019    Lump of scalp    Oral mucositis (ulcerative), unspecified 09/07/2023    Osteoarthritis 02/28/2023    Otalgia, right ear 01/11/2016    Right ear pain    Other acute sinusitis 01/15/2020    Other acute sinusitis, recurrence not specified    Other fecal abnormalities 11/15/2018    Positive FIT (fecal immunochemical test)    Other forms of stomatitis 01/25/2017    Oral ulcer    Other malaise 11/20/2017    Malaise and fatigue    Other specified disorders of eustachian tube, bilateral 02/23/2018    Dysfunction of both eustachian tubes    Other specified disorders of eustachian tube, right ear 01/11/2016    Dysfunction of right  eustachian tube    Personal history of diseases of the skin and subcutaneous tissue 01/06/2017    History of impetigo    Personal history of other diseases of the circulatory system     History of hypertension    Personal history of other diseases of the digestive system 11/18/2015    History of acute gastritis    Personal history of other diseases of the respiratory system 08/25/2014    Personal history of acute sinusitis    Personal history of other diseases of the respiratory system 02/23/2018    History of acute bronchitis    Personal history of other diseases of the respiratory system 10/31/2016    History of acute sinusitis    Personal history of other diseases of the respiratory system 12/16/2013    History of acute bronchitis    Personal history of other infectious and parasitic diseases 01/25/2017    History of candidiasis of mouth    Personal history of other specified conditions 06/19/2018    History of dizziness    Personal history of other specified conditions 12/28/2015    History of fatigue    Personal history of other specified conditions 11/12/2021    History of weight loss    Personal history of other specified conditions 01/02/2019    History of urinary frequency    Personal history of other specified conditions 10/12/2022    History of abnormal weight loss    Personal history of other specified conditions     History of painful urination    Personal history of other specified conditions 03/26/2015    History of shortness of breath    Radiculopathy, cervical region 10/10/2017    Cervical radiculopathy    Segmental and somatic dysfunction of cervical region 05/03/2017    Cervical somatic dysfunction    Spondylosis without myelopathy or radiculopathy, cervical region 09/19/2017    Cervical spondylosis    Sprain of ligaments of lumbar spine, initial encounter 02/23/2018    Lumbar sprain, initial encounter    Unspecified hearing loss, right ear 08/29/2018    Hearing loss, right    Urinary tract  infection, site not specified 09/13/2021    Acute lower UTI   [2] amLODIPine, 5 mg, oral, Daily  enoxaparin, 40 mg, subcutaneous, Daily  fluticasone, 2 spray, Each Nostril, Daily  ketotifen, 1 drop, Right Eye, BID  polyethylene glycol, 17 g, oral, Daily  sennosides-docusate sodium, 1 tablet, oral, Nightly  [3]    [4] PRN medications: acetaminophen

## 2025-07-24 NOTE — PROGRESS NOTES
Pharmacy Medication History Review    Kaitlin Robles is a 80 y.o. female admitted for Generalized weakness. Pharmacy reviewed the patient's [id number] medications and allergies for accuracy.    The list below reflectives the updated PTA list. Please review each medication in order reconciliation for additional clarification and justification.  Medications Prior to Admission   Medication Sig Dispense Refill Last Dose/Taking    amLODIPine (Norvasc) 5 mg tablet Take 1 tablet (5 mg) by mouth once daily. 90 tablet 3 7/23/2025 Morning    cetirizine (ZyrTEC) 10 mg tablet Take 1 tablet (10 mg) by mouth once daily. 90 tablet 3 Past Month    ketotifen (Zaditor) 0.025 % (0.035 %) ophthalmic solution Administer 1 drop into the right eye 2 times a day. (Patient taking differently: Administer 2 drops into the right eye once daily.) 10 mL 3 7/23/2025 Morning    rasagiline (Azilect) 0.5 mg tablet Take 1 tablet (0.5 mg) by mouth once daily. 30 tablet 11 7/23/2025 Morning    acetaminophen (TylenoL) 325 mg tablet Take 1 tablet (325 mg) by mouth every 4 hours if needed for mild pain (1 - 3).       diphenhydramine HCl (BENADRYL ORAL) Take 25 mg by mouth 4 times a day as needed (itching).       multivitamin tablet Take 1 tablet by mouth once daily.           The list below reflectives the updated allergy list. Please review each documented allergy for additional clarification and justification.  Allergies  Reviewed by Tico Wilcox RN on 7/23/2025        Severity Reactions Comments    Ampicillin High Shortness of breath     Aspirin High Shortness of breath     Ibuprofen High Shortness of breath     Baclofen Not Specified Unknown flushing    Cefdinir Not Specified Unknown     Citalopram Not Specified Unknown     Codeine Not Specified Other     Doxycycline Monohydrate Not Specified Unknown Gatrointestinal upset    Ethyl Alcohol Not Specified Other     Ezetimibe Not Specified Unknown Myalgia    Fenoldopam Not Specified Other      Formaldehyde Not Specified Other     Gabapentin Not Specified Unknown Spacey    Glycerin Not Specified Other     Iodine Not Specified Unknown     Levofloxacin Not Specified Unknown     Losartan Potassium Not Specified Unknown Myalgia    Meloxicam Not Specified Unknown Fatigue    Meperidine Not Specified Unknown     Mirapex [pramipexole] Not Specified Other sedation    Morphine Not Specified Unknown     Prednisone Not Specified Unknown     Sinemet [carbidopa-levodopa] Not Specified Myalgia     Statins-hmg-coa Reductase Inhibitors Not Specified Unknown Myalgia    Sulfa (sulfonamide Antibiotics) Not Specified Unknown     Tuna Oil Not Specified Other     Viibryd [vilazodone] Not Specified Diarrhea, Unknown Myalgia    Ropinirole Low Itching Ears & throat itching            Below are additional concerns with the patient's PTA list.  Confirmed medications with patient.     Edi Cole RPh

## 2025-07-24 NOTE — PROGRESS NOTES
Department of Hospital Medicine  Daily Progress Note   Hospital Day: 2       Name:Kaitlin Robles, AGE: 80 y.o., GENDER: female, MRN: [id number], ROOM: 220/220-B   CODE STATUS: DNR and No Intubation  Attending Physician: Glenn Gonzalez DO  Resident: Damari Dejesus DO        Chief Complaint     Chief Complaint   Patient presents with    Weakness, Gen     States she was unable to get out of bed today due to weakness. Denies pain anywhere. States she is a DNR- CC        Interval History   Kaitlin Robles is a 80 y.o. year old female patient on Hospital Day: 2    Overnight events:  No acute events overnight  Seen and examined at bedside this morning.     Subjective    Patient states that she feels better this morning. Denies recent illness, fevers, chills, nausea, vomiting, dizziness, or headaches. States she still has some abdominal pain and hasn't had a bowel movement. Reports that she may have missed some doses of her parkinsons medications, also that she does not like taking them because they make her tired.   Meds    Scheduled medications  Scheduled Medications[1]  Continuous medications  Continuous Medications[2]  PRN medications  PRN Medications[3]     Objective    Exam   Physical Exam  Constitutional:       General: She is not in acute distress.     Appearance: Normal appearance. She is not ill-appearing, toxic-appearing or diaphoretic.      Comments: Frail appearing       Cardiovascular:      Rate and Rhythm: Normal rate and regular rhythm.      Pulses: Normal pulses.      Heart sounds: Normal heart sounds. No murmur heard.     No friction rub. No gallop.   Pulmonary:      Effort: Pulmonary effort is normal. No respiratory distress.      Breath sounds: Normal breath sounds. No stridor. No wheezing, rhonchi or rales.   Chest:      Chest wall: No tenderness.   Abdominal:      General: Abdomen is flat. There is no distension.      Palpations: Abdomen is soft. There is no mass.      Tenderness: There is  "abdominal tenderness (diffusely). There is no guarding or rebound.      Hernia: No hernia is present.     Musculoskeletal:         General: No swelling, tenderness, deformity or signs of injury.      Right lower leg: No edema.      Left lower leg: No edema.      Comments: Cogwheel rigidity     Skin:     General: Skin is warm and dry.      Coloration: Skin is not jaundiced or pale.      Findings: No bruising, erythema, lesion or rash.     Neurological:      General: No focal deficit present.      Mental Status: She is alert and oriented to person, place, and time.     Psychiatric:         Mood and Affect: Mood normal.         Behavior: Behavior normal.          Vitals         1/23/2025     8:58 PM 7/23/2025     5:17 PM 7/24/2025     1:03 AM 7/24/2025     1:10 AM 7/24/2025     1:12 AM 7/24/2025     3:50 AM 7/24/2025     7:57 AM   Vitals   Systolic 138 163 151 146 146 130 154   Diastolic 86 76 73 82 82 63 85   BP Location  Right arm Right arm Left arm Left arm Left arm    Heart Rate  100 76 80 80 95 84   Temp  36.6 °C (97.9 °F) 36.8 °C (98.2 °F) 36.4 °C (97.5 °F) 36.4 °C (97.5 °F) 36.6 °C (97.9 °F) 36.6 °C (97.9 °F)   Resp  16 14 16 16 15 16   Height  1.626 m (5' 4\")  1.626 m (5' 4\")      Weight (lb)  110  104.5      BMI  18.88 kg/m2  17.94 kg/m2      BSA (m2)  1.5 m2  1.46 m2      Visit Report Report            No intake or output data in the 24 hours ending 07/24/25 0840    Labs   Labs:   Results from last 72 hours   Lab Units 07/24/25  0627 07/23/25  1734   SODIUM mmol/L 141 140   POTASSIUM mmol/L 3.8 3.9   CHLORIDE mmol/L 108* 104   CO2 mmol/L 26 26   BUN mg/dL 25* 21   CREATININE mg/dL 0.89 0.90   GLUCOSE mg/dL 88 104*   CALCIUM mg/dL 8.8 9.6   ANION GAP mmol/L 11 14   EGFR mL/min/1.73m*2 66 66   PHOSPHORUS mg/dL 4.0  --       Results from last 72 hours   Lab Units 07/24/25  0627 07/23/25  1734   WBC AUTO x10*3/uL 5.2 7.9   HEMOGLOBIN g/dL 10.9* 12.5   HEMATOCRIT % 34.5* 38.1   PLATELETS AUTO x10*3/uL 193 222 "   NEUTROS PCT AUTO %  --  75.7   LYMPHS PCT AUTO %  --  14.4   MONOS PCT AUTO %  --  8.4   EOS PCT AUTO %  --  1.1      Lab Results   Component Value Date    CALCIUM 8.8 07/24/2025    PHOS 4.0 07/24/2025        Micro/ID:   No results found for the last 90 days.        Images    Imaging  US gallbladder  Result Date: 7/24/2025  No evidence of cholelithiasis, cholecystitis, or biliary dilatation.   Signed by: Jim Calvert 7/24/2025 12:53 AM Dictation workstation:   [id number]    CT abdomen pelvis wo IV contrast  Result Date: 7/23/2025  1.Moderate stool burden within the colon. Diverticular disease without discrete evidence of diverticulitis. 2.Question mild gallbladder wall thickening.  Consider ultrasound for further evaluation if clinically indicated. 3.Additional findings as above. Signed by Ramu Ceja MD    CT head wo IV contrast  Result Date: 7/23/2025  No evidence of acute intracranial hemorrhage or mass effect.       MACRO: None   Signed by: Hesham Grove 7/23/2025 6:46 PM Dictation workstation:   [id number]    XR chest 2 views  Result Date: 7/23/2025  No evidence of acute intrathoracic abnormality.   Signed by: Saul Graf 7/23/2025 6:10 PM Dictation workstation:   [id number]      Cardiology, Vascular, and Other Imaging  No other imaging results found for the past 7 days      Assessment    Assessment and Plan    Kaitlin Robles is a 80 y.o. female admitted on 7/23/2025     Updates 07/24/25  :  No bowel movements yet    ACUTE MEDICAL ISSUES:  #Generalized weakness  - Ongoing problem, worsened yesterday when she was unable to get out of bed. Lives independently with aide assist 2 x weekly  - VSS, no metabolic derangements, UA negative  PLAN:  - PT/OT consulted    #Constipation  - CT showed moderate stool burden. Last BM reportedly 1 week ago  - S/p lactulose in ED  PLAN:  - Continue miralax and lauri-colace  - Monitor for BM    ADDRESSED/ RESOLVED MEDICAL ISSUES:        CHRONIC MEDICAL ISSUES:  # HTN:  Continue amlodipine  # Afib: not on AC as eliquis was giving her GI issues  # Parkinson's: continue home rasagiline      Fluids: PO intake & IVF PRN  Electrolytes: Replete PRN  Nutrition: Regular diet  Antimicrobials: None  DVT ppx: Lovenox subq  GI ppx: None  Catheter: None  Lines: PIV  Supplemental Oxygen: None  Emergency Contact: Extended Emergency Contact Information  Primary Emergency Contact: Roel Wilson  Home Phone: [phone]  Relation: Relative  Secondary Emergency Contact: HERMANNSTEPHENMINH  Home Phone: [phone]  Relation: Other   Code: DNR and No Intubation     Disposition: 80 y.o. female admitted for generalized weakness. Discharge pending PT/OT evaluation and placement.     Damari Dejesus,    Internal Medicine, PGY- 1  07/24/25 at 8:40 AM                    [1] amLODIPine, 5 mg, oral, Daily  enoxaparin, 40 mg, subcutaneous, Daily  fluticasone, 2 spray, Each Nostril, Daily  ketotifen, 1 drop, Right Eye, BID  polyethylene glycol, 17 g, oral, Daily  sennosides-docusate sodium, 1 tablet, oral, Nightly  [2]    [3]

## 2025-07-24 NOTE — PROGRESS NOTES
Occupational Therapy    Evaluation    Patient Name: Kaitlin Robles  MRN: [id number]  Department: [address]  [facility]  Today's Date: 7/24/2025  Time Calculation  Start Time: 1143  Stop Time: 1209  Time Calculation (min): 26 min    Assessment  IP OT Assessment  OT Assessment: Pt presents with decreased ADL performance, decreased functional mobility, decreased endurance. Recommend continued skilled OT at a mod intensity to maximize pt safety and independence after discharge.  Prognosis: Good  Barriers to Discharge Home: Caregiver assistance, Physical needs  Caregiver Assistance: Patient lives alone and/or does not have reliable caregiver assistance  Physical Needs: 24hr mobility assistance needed, 24hr ADL assistance needed, High falls risk due to function or environment  Evaluation/Treatment Tolerance: Patient tolerated treatment well  Medical Staff Made Aware: Yes  End of Session Communication: Bedside nurse  End of Session Patient Position: Up in chair, Alarm on  Plan:  Treatment Interventions: ADL retraining, Functional transfer training, Endurance training, UE strengthening/ROM  OT Frequency: 3 times per week (During this acute inpatient hospitalization. Based on current functional status & rehab potential, patient is anticipated to tolerate and benefit from 5+ days/wk of skilled rehabilitative therapy after discharge from this acute inpatient hospitalization.)  OT Discharge Recommendations: Moderate intensity level of continued care  Equipment Recommended upon Discharge: Wheeled walker (owns)  OT Recommended Transfer Status: Assist of 2  OT - OK to Discharge: Yes (per OT POC)    Subjective   Current Problem:  1. Generalized weakness        2. Unable to care for self        3. Parkinson's disease, unspecified whether dyskinesia present, unspecified whether manifestations fluctuate        4. Constipation, unspecified constipation type          OT Visit Info:  OT Received On: 07/24/25  General Visit  Info:  General  Reason for Referral: 79 yo female referred to OT for impaired ADLs/mobility  Referred By: Faby Babcock DO  Past Medical History Relevant to Rehab: parkinson's, HTN, IBS, OA, Afib not on anticoagulation  Co-Treatment: PT  Co-Treatment Reason: maximize pt safety and outcomes  Prior to Session Communication: Bedside nurse  Patient Position Received: Bed, 4 rail up, Alarm on  General Comment: Pt pleasant, cooperative with OT evaluation  Precautions:  Medical Precautions: Fall precautions (tele, purewick)           Pain:  Pain Assessment  Pain Assessment: 0-10  0-10 (Numeric) Pain Score: 6  Pain Type: Chronic pain  Pain Location: Back  Pain Interventions: Repositioned  Response to Interventions: Content/relaxed (RN aware)    Objective   Cognition:  Overall Cognitive Status: Within Functional Limits  Arousal/Alertness: Appropriate responses to stimuli  Orientation Level: Oriented X4           Home Living:  Type of Home: Mobile home  Lives With: Alone  Home Adaptive Equipment: Walker rolling or standard, Wheelchair-manual, Cane  Home Layout: One level  Home Access: Ramped entrance  Bathroom Shower/Tub: Walk-in shower  Bathroom Equipment: Grab bars in shower, Shower chair with back, Hand-held shower hose   Prior Function:  Level of Love: Needs assistance with ADLs, Needs assistance with homemaking  Receives Help From: Home health  ADL Assistance: Needs assistance (assist from [facility] for bathing 2x/wk)  Homemaking Assistance: Needs assistance ([facility] assists)  Ambulatory Assistance: Independent (with FWW)     ADL:  Eating Assistance: Minimal  Grooming Assistance: Minimal  Bathing Assistance: Moderate  UE Dressing Assistance: Minimal  LE Dressing Assistance: Total  Toileting Assistance with Device: Total  Functional Assistance: Maximal  ADL Comments: ADL performance anticipated  Activity Tolerance:  Endurance: Tolerates 10 - 20 min exercise with multiple rests  Bed Mobility/Transfers: Bed Mobility  Bed  Mobility: Yes  Bed Mobility 1  Bed Mobility 1: Supine to sitting  Level of Assistance 1: Moderate assistance, +2  Bed Mobility Comments 1: assist with scooting trunk towards EOB and achieving upright position    Transfers  Transfer: Yes  Transfer 1  Transfer From 1: Sit to  Transfer to 1: Stand  Technique 1: Sit to stand, Stand to sit  Transfer Device 1: Walker  Transfer Level of Assistance 1: Moderate assistance, +2  Transfers 2  Transfer From 2: Stand to  Transfer to 2: Chair with arms  Technique 2: Stand pivot  Transfer Device 2: Walker  Transfer Level of Assistance 2: Maximum assistance  Trials/Comments 2: therapist positioned in front of pt. Able to take 1 lateral step before requiring assistance with trunk and positioning of hips      Functional Mobility:     Sitting Balance:  Static Sitting Balance  Static Sitting-Balance Support: Feet supported  Static Sitting-Level of Assistance: Minimum assistance (L lean, retropulsive)  Standing Balance:  Static Standing Balance  Static Standing-Balance Support: Bilateral upper extremity supported  Static Standing-Level of Assistance: Moderate assistance  Static Standing-Comment/Number of Minutes: NBOS, retro leaning    Strength:  Strength Comments: BUE 3+/5     Coordination:  Movements are Fluid and Coordinated: Yes   Hand Function:  Hand Function  Gross Grasp: Functional  Coordination: Impaired (L hand with severe arthritis)  Extremities: RUE   RUE : Within Functional Limits and LUE   LUE: Within Functional Limits    Outcome Measures: [facility] Daily Activity  Putting on and taking off regular lower body clothing: Total  Bathing (including washing, rinsing, drying): A lot  Putting on and taking off regular upper body clothing: A little  Toileting, which includes using toilet, bedpan or urinal: Total  Taking care of personal grooming such as brushing teeth: A little  Eating Meals: A little  Daily Activity - Total Score: 13      Education Documentation  Body Mechanics, taught  by Gee Gold OT at 7/24/2025  2:46 PM.  Learner: Patient  Readiness: Acceptance  Method: Explanation  Response: Verbalizes Understanding    Precautions, taught by Gee Gold OT at 7/24/2025  2:46 PM.  Learner: Patient  Readiness: Acceptance  Method: Explanation  Response: Verbalizes Understanding    ADL Training, taught by Gee Gold OT at 7/24/2025  2:46 PM.  Learner: Patient  Readiness: Acceptance  Method: Explanation  Response: Verbalizes Understanding    Education Comments  No comments found.      Goals:   Encounter Problems       Encounter Problems (Active)       OT Goals       Pt will demo Grooming/UE ADL completion with setup, using adaptive strategies and energy conservation techniques as applicable.        Start:  07/24/25    Expected End:  08/07/25            Pt will demo LE ADL completion with Mod A, using AE if needed.        Start:  07/24/25    Expected End:  08/07/25            Pt will increase endurance to tolerate 15min of OOB activity with no more than 1 rest break in order to increase ability to engage in ADL completion.        Start:  07/24/25    Expected End:  08/07/25            Pt will complete onmn-xv-ylyn transfers using LRD in preparation for ADLs with min A        Start:  07/24/25    Expected End:  08/07/25            Pt will increase static/dynamic sitting balance to Good to increase safety and independence with functional task completion.         Start:  07/24/25    Expected End:  08/07/25

## 2025-07-24 NOTE — H&P
HPI    Kaitlin Robles is a 80 y.o. female w/ PMH of parkinson's, HTN, IBS, OA, Afib not on anticoagulation who presents to ED for generalized weakness.  Patient states that she was in bed yesterday and was unable to get out of bed due to weakness.  She called assistance from her neighbor however was unable to get out of bed which prompted her to call 911.  She states that when she tried to get out of bed she felt her legs feeling more weak and on steady and she did not want to risk falling.  She denies any recent illness, fevers, chills, nausea, vomiting, dizziness or headaches. She also endorses some abdominal discomfort and states she has not had a bowel movement for the past week.  She has alternating episodes of diarrhea and constipation which has been chronic.     Patient reports worsening weakness since her Parkinson's diagnosis.  She lives independently and has home health aide come to her house twice a week to help with ADLs.  She uses a walker to ambulate. She stopped taking eliquis for her Afib as it was making her stomach upset.    ED course:     Vitals: Afebrile, , RR 16, /76, SpO2 98% on room air  Labs:   -CBC -unremarkable  -CMP -unremarkable  -UA -negative  -Troponin: 9-->11  -Lactate: 1.1  -BNP: 58  -M.07    Imaging:   - CXR: No evidence of acute intrathoracic abnormality  - CT head: No evidence of acute intracranial hemorrhage or mass effect  - CT a/p: Moderate stool burden within the colon.  Diverticular disease without evidence of diverticulitis.  Questionable mild gallbladder wall thickening, consider ultrasound.  - EKG: Sinus tachycardia    Micro:   - RSV/COVID/influenza negative    Interventions: 500ml NS bolus, lactulose 20 g    ROS: 12 points review of system is negative except as stated in the HPI above.   Past Medical History   Medical History[1]   Surgical History   Surgical History[2]  Family History   Family History[3]  Social History     Social History     Socioeconomic  History    Marital status:      Spouse name: Not on file    Number of children: Not on file    Years of education: Not on file    Highest education level: Not on file   Occupational History    Not on file   Tobacco Use    Smoking status: Never    Smokeless tobacco: Never   Vaping Use    Vaping status: Never Used   Substance and Sexual Activity    Alcohol use: Never    Drug use: Never    Sexual activity: Not on file   Other Topics Concern    Not on file   Social History Narrative    Not on file     Social Drivers of Health     Financial Resource Strain: Low Risk  (7/24/2025)    Overall Financial Resource Strain (CARDIA)     Difficulty of Paying Living Expenses: Not very hard   Food Insecurity: No Food Insecurity (7/24/2025)    Hunger Vital Sign     Worried About Running Out of Food in the Last Year: Never true     Ran Out of Food in the Last Year: Never true   Transportation Needs: No Transportation Needs (7/24/2025)    PRAPARE - Transportation     Lack of Transportation (Medical): No     Lack of Transportation (Non-Medical): No   Physical Activity: Inactive (5/9/2025)    Received from [facility]    Exercise Vital Sign     On average, how many days per week do you engage in moderate to strenuous exercise (like a brisk walk)?: 0 days     On average, how many minutes do you engage in exercise at this level?: 0 min   Stress: No Stress Concern Present (5/9/2025)    Received from [facility]    [place] [employer] - Occupational Stress Questionnaire     Feeling of Stress : Not at all   Social Connections: Unknown (5/9/2025)    Received from [facility]    Social Connection and Isolation Panel     In a typical week, how many times do you talk on the phone with family, friends, or neighbors?: Three times a week     How often do you get together with friends or relatives?: Three times a week     How often do you attend Moravian or Adventism services?: Patient declined     Do you belong to any clubs or  "organizations such as Christian groups, unions, fraternal or athletic groups, or school groups?: Patient declined     How often do you attend meetings of the clubs or organizations you belong to?: Patient declined     Are you , , , , never , or living with a partner?:    Intimate Partner Violence: Not At Risk (7/24/2025)    Humiliation, Afraid, Rape, and Kick questionnaire     Fear of Current or Ex-Partner: No     Emotionally Abused: No     Physically Abused: No     Sexually Abused: No   Housing Stability: Low Risk  (7/24/2025)    Housing Stability Vital Sign     Unable to Pay for Housing in the Last Year: No     Number of Times Moved in the Last Year: 0     Homeless in the Last Year: No       Tobacco Use: Low Risk  (7/23/2025)    Patient History     Smoking Tobacco Use: Never     Smokeless Tobacco Use: Never     Passive Exposure: Not on file        Social History     Substance and Sexual Activity   Alcohol Use Never      Allergies   RX Allergies[4]   Meds    Scheduled medications  Scheduled Medications[5]  Continuous medications  Continuous Medications[6]  PRN medications  PRN Medications[7]   Objective     Vitals  Visit Vitals  /63 (BP Location: Left arm)   Pulse 95   Temp 36.6 °C (97.9 °F) (Temporal)   Resp 15   Ht 1.626 m (5' 4\")   Wt 47.4 kg (104 lb 8 oz)   SpO2 94%   BMI 17.94 kg/m²   OB Status Hysterectomy   Smoking Status Never   BSA 1.46 m²        Physical Examination:  Gen: Frail appearing  HEENT: AT/NC  Chest: CTAB, no wheezing  CVS: RRR, no murmurs  Abd: soft, flat, non tender  Ext: no significant pedal edema  Neuro: AOx3, no focal neuro deficits    I/Os  No intake or output data in the 24 hours ending 07/24/25 0524    Labs:   Results from last 72 hours   Lab Units 07/23/25  1734   SODIUM mmol/L 140   POTASSIUM mmol/L 3.9   CHLORIDE mmol/L 104   CO2 mmol/L 26   BUN mg/dL 21   CREATININE mg/dL 0.90   GLUCOSE mg/dL 104*   CALCIUM mg/dL 9.6   ANION GAP mmol/L " "14   EGFR mL/min/1.73m*2 66      Results from last 72 hours   Lab Units 07/23/25  1734   WBC AUTO x10*3/uL 7.9   HEMOGLOBIN g/dL 12.5   HEMATOCRIT % 38.1   PLATELETS AUTO x10*3/uL 222   NEUTROS PCT AUTO % 75.7   LYMPHS PCT AUTO % 14.4   MONOS PCT AUTO % 8.4   EOS PCT AUTO % 1.1      Lab Results   Component Value Date    CALCIUM 9.6 07/23/2025    PHOS 3.8 08/12/2023      No results found for: \"CRP\"   [unfilled]     Micro/ID:   No results found for the last 90 days.                   No lab exists for component: \"AGALPCRNB\"   .ID  No results found for: \"URINECULTURE\", \"BLOODCULT\", \"CSFCULTSMEAR\"  Images    US gallbladder  Narrative: Interpreted By:  Jim Calvert,   STUDY:  No  US GALLBLADDER;  7/24/2025 12:27 am      INDICATION:  Signs/Symptoms:thickening per imaging no pain.      COMPARISON:  None.      ACCESSION NUMBER(S):  [id number]      ORDERING CLINICIAN:  CANDY LEZAMA      TECHNIQUE:  Multiple images of the right upper quadrant were obtained.      FINDINGS:  LIVER: Normal size. Normal echogenicity, and echotexture. No focal  abnormalities.      BILE DUCTS: No intrahepatic or extrahepatic bile duct dilatation.  Extrahepatic bile duct =   2 mm.      GALLBLADDER: Gallbladder is contracted. No stones, pericholecystic  fluid, wall thickening, or localized tenderness.      PANCREAS: Normal head and body. Limited evaluation of the pancreatic  tail due to bowel gas.      RIGHT KIDNEY: Normal size, no hydronephrosis.      PERITONEUM: No upper abdominal ascites.      Impression: No evidence of cholelithiasis, cholecystitis, or biliary dilatation.      Signed by: Jim Calvert 7/24/2025 12:53 AM  Dictation workstation:   [id number]    Assessment and Plan    Kaitlin Robles is a 80 y.o. female w/ PMH of parkinson's, HTN, IBS, OA, Afib not on anticoagulation who is admitted for generalized weakness and placement.     Acute Medical Issues   # Generalized weakness  - Ongoing problem, worsened yesterday when she " was unable to get out of bed. Lives independently, has aide assist her 2 times weekly.   - VSS, no metabolic derangements, UA negative  - PT/OT consulted    # Constipation  - CT with moderate stool burden. Last BM was 1 week ago  - s/p lactulose in ED  - Start miralax and lauri-colace  - monitor for BM    Chronic Medical Issues   # HTN: Continue amlodipine  # Afib: not on AC as eliquis was giving her GI issues  # Parkinson's: continue home rasagiline    F: PO intake & IVF PRN   E: Replete as needed   N: Regular diet  GI ppx: None  DVT ppx: Lovenox Subq  Antibiotics: None  Tubes/Lines/Drains: PIV    Code Status: DNR and No Intubation   Emergency Contact: Extended Emergency Contact Information  Primary Emergency Contact: SteveRoel orozco  Home Phone: [phone]  Relation: Relative  Secondary Emergency Contact: MINH ALVARES  Home Phone: [phone]  Relation: Other     Disposition: 80 y.o.female admitted for generalized weakness. Estimated length of stay < 48 hrs.     Faby Babcock DO  PGY-3  Internal Medicine           [1]   Past Medical History:  Diagnosis Date    Acute bronchitis due to other specified organisms 01/15/2020    Acute bronchitis due to other specified organisms    Acute maxillary sinusitis, unspecified 10/10/2017    Acute non-recurrent maxillary sinusitis    Bitten or stung by nonvenomous insect and other nonvenomous arthropods, initial encounter 07/12/2016    Bedbug bite, initial encounter    Cervicalgia 02/28/2023    Closed compression fracture of thoracic vertebra (Multi) 02/28/2023    Encounter for screening for cardiovascular disorders 04/11/2016    Encounter for screening for cardiovascular disorders    Encounter for screening for diabetes mellitus 04/11/2016    Encounter for screening for diabetes mellitus    Encounter for screening for lipoid disorders 04/11/2016    Encounter for screening for lipoid disorders    Encounter for screening for osteoporosis 03/30/2021    Osteoporosis  screening    Enteroviral vesicular pharyngitis 08/19/2015    Herpangina    Facial droop 09/07/2023    Gastroduodenitis 09/07/2023    Impacted cerumen, left ear 06/19/2018    Impacted cerumen of left ear    Impacted cerumen, right ear 08/29/2018    Impacted cerumen of right ear    Localized enlarged lymph nodes 10/30/2019    Cervical lymphadenopathy    Localized enlarged lymph nodes 11/30/2016    Lymphadenopathy, inguinal    Localized swelling, mass and lump, head 09/23/2019    Lump of scalp    Oral mucositis (ulcerative), unspecified 09/07/2023    Osteoarthritis 02/28/2023    Otalgia, right ear 01/11/2016    Right ear pain    Other acute sinusitis 01/15/2020    Other acute sinusitis, recurrence not specified    Other fecal abnormalities 11/15/2018    Positive FIT (fecal immunochemical test)    Other forms of stomatitis 01/25/2017    Oral ulcer    Other malaise 11/20/2017    Malaise and fatigue    Other specified disorders of eustachian tube, bilateral 02/23/2018    Dysfunction of both eustachian tubes    Other specified disorders of eustachian tube, right ear 01/11/2016    Dysfunction of right eustachian tube    Personal history of diseases of the skin and subcutaneous tissue 01/06/2017    History of impetigo    Personal history of other diseases of the circulatory system     History of hypertension    Personal history of other diseases of the digestive system 11/18/2015    History of acute gastritis    Personal history of other diseases of the respiratory system 08/25/2014    Personal history of acute sinusitis    Personal history of other diseases of the respiratory system 02/23/2018    History of acute bronchitis    Personal history of other diseases of the respiratory system 10/31/2016    History of acute sinusitis    Personal history of other diseases of the respiratory system 12/16/2013    History of acute bronchitis    Personal history of other infectious and parasitic diseases 01/25/2017    History of  candidiasis of mouth    Personal history of other specified conditions 06/19/2018    History of dizziness    Personal history of other specified conditions 12/28/2015    History of fatigue    Personal history of other specified conditions 11/12/2021    History of weight loss    Personal history of other specified conditions 01/02/2019    History of urinary frequency    Personal history of other specified conditions 10/12/2022    History of abnormal weight loss    Personal history of other specified conditions     History of painful urination    Personal history of other specified conditions 03/26/2015    History of shortness of breath    Radiculopathy, cervical region 10/10/2017    Cervical radiculopathy    Segmental and somatic dysfunction of cervical region 05/03/2017    Cervical somatic dysfunction    Spondylosis without myelopathy or radiculopathy, cervical region 09/19/2017    Cervical spondylosis    Sprain of ligaments of lumbar spine, initial encounter 02/23/2018    Lumbar sprain, initial encounter    Unspecified hearing loss, right ear 08/29/2018    Hearing loss, right    Urinary tract infection, site not specified 09/13/2021    Acute lower UTI   [2]   Past Surgical History:  Procedure Laterality Date    BREAST LUMPECTOMY  09/25/2013    Breast Surgery Lumpectomy    COLONOSCOPY  03/26/2015    Complete Colonoscopy    ESOPHAGOGASTRODUODENOSCOPY  03/16/2016    Diagnostic Esophagogastroduodenoscopy    KNEE ARTHROSCOPY W/ DEBRIDEMENT  09/25/2013    Knee Arthroscopy (Therapeutic)    MR HEAD ANGIO WO IV CONTRAST  8/9/2023    MR HEAD ANGIO WO IV CONTRAST 8/9/2023 [facility]    MR NECK ANGIO WO IV CONTRAST  8/9/2023    MR NECK ANGIO WO IV CONTRAST 8/9/2023 [facility]    OTHER SURGICAL HISTORY  09/25/2013    Breast Surgery Enlargement Procedure Bilateral    OTHER SURGICAL HISTORY  09/25/2013    Breast Surgery Removal Of Mammary Implant Bilateral    OTHER SURGICAL HISTORY  04/18/2022    Cataract surgery    OTHER SURGICAL  HISTORY  04/18/2022    Turbinate reduction    OTHER SURGICAL HISTORY  04/18/2022    [place] tooth extraction    OTHER SURGICAL HISTORY  11/20/2019    Cyst excision    TOTAL ABDOMINAL HYSTERECTOMY  09/25/2013    Total Abdominal Hysterectomy    TOTAL KNEE ARTHROPLASTY Right 05/22/2023    Dr. Potter   [3]   Family History  Problem Relation Name Age of Onset    Allergies Mother      Heart failure Mother      Mental illness Mother     [4]   Allergies  Allergen Reactions    Ampicillin Shortness of breath    Aspirin Shortness of breath    Ibuprofen Shortness of breath    Baclofen Unknown     flushing    Banana Other    Cefdinir Unknown    Citalopram Unknown    Codeine Other    Doxycycline Monohydrate Unknown     Gatrointestinal upset    Egg Other    Ethyl Alcohol Other    Ezetimibe Unknown     Myalgia    Fenoldopam Other    Formaldehyde Other    Gabapentin Unknown     Spacey    Glycerin Other    Iodine Unknown    Levofloxacin Unknown    Losartan Potassium Unknown     Myalgia    Meloxicam Unknown     Fatigue    Meperidine Unknown    Mirapex [Pramipexole] Other     sedation    Morphine Unknown    Peanut Other    Prednisone Unknown    Shellfish Containing Products Unknown    Shellfish Derived Unknown    Sinemet [Carbidopa-Levodopa] Myalgia    Soy Germ Other    Statins-Hmg-Coa Reductase Inhibitors Unknown     Myalgia    Sulfa (Sulfonamide Antibiotics) Unknown    Tuna Oil Other    Viibryd [Vilazodone] Diarrhea and Unknown     Myalgia    Wheat Other    Ropinirole Itching     Ears & throat itching   [5] amLODIPine, 5 mg, oral, Daily  enoxaparin, 40 mg, subcutaneous, Daily  fluticasone, 2 spray, Each Nostril, Daily  ketotifen, 1 drop, Right Eye, BID  polyethylene glycol, 17 g, oral, Daily  sennosides-docusate sodium, 1 tablet, oral, Nightly     [6]    [7]

## 2025-07-24 NOTE — PROGRESS NOTES
Physical Therapy    Physical Therapy Evaluation    Patient Name: Kaitlin Robles  MRN: [id number]  Department: [address]  [facility]  Today's Date: 7/24/2025   Time Calculation  Start Time: 1144  Stop Time: 1208  Time Calculation (min): 24 min    Assessment/Plan   PT Assessment  PT Assessment Results: Decreased strength, Impaired balance, Decreased mobility, Decreased endurance  Rehab Prognosis: Good  Barriers to Discharge Home: Caregiver assistance, Physical needs  Caregiver Assistance: Caregiver assistance needed per identified barriers - however, level of patient's required assistance exceeds assistance available at home  Physical Needs: Ambulating household distances limited by function/safety, High falls risk due to function or environment, 24hr mobility assistance needed  Evaluation/Treatment Tolerance: Patient tolerated treatment well  Medical Staff Made Aware: Yes  Strengths: Access to adaptive/assistive products, Capable of completing ADLs semi/independent, Housing layout  Barriers to Participation: Comorbidities  End of Session Communication: Bedside nurse  Assessment Comment: Pt is 80 yr female with generalized weakness, constipation and Parkinson's disease resulting in impaired safety safety awareness, impaired gait and mobility. Pt required mod.Ax2 to complete bed mobility and transfers. Pt would benefit from mod. intensity skilled PT interventions to increase endurance, strength and mobility needed to complete daily living activities.  End of Session Patient Position: Up in chair, Alarm on  IP OR SWING BED PT PLAN  Inpatient or Swing Bed: Inpatient  PT Plan  Treatment/Interventions: Bed mobility, Transfer training, Gait training, Balance training, Strengthening, Endurance training, Therapeutic exercise, Therapeutic activity, Positioning  PT Plan: Ongoing PT  PT Frequency: 3 times per week (per this acute hospital stay)  PT Discharge Recommendations: Moderate intensity level of continued care (Based  on current functional status and rehab potential, patient is anticipated to tolerate and benefit from 5 or more days per week of skilled rehabilitative therapy after discharge from this acute inpatient hospitalization.)  Equipment Recommended upon Discharge: Wheeled walker, Straight cane (owns)  PT Recommended Transfer Status: Assist x2 (w/FWW and gait belt)  PT - OK to Discharge: Yes (per PT POC)    Subjective     PT Visit Info:  PT Received On: 07/24/25  General Visit Information:  General  Reason for Referral: Pt is 80 yr female with generalized weakness, constipation, Parkinson's disease and unspecific whether dyskinesia present resulting in impaired safety awareness as well as impaired mobility and gait.  Referred By: Faby Babcock DO  Past Medical History Relevant to Rehab: parkinson's, HTN, IBS, OA, Afib not on anticoagulation  Family/Caregiver Present: No  Co-Treatment: OT  Co-Treatment Reason: To maximize pt safety and functional outcomes due to decreased endurance.  Prior to Session Communication: Bedside nurse  Patient Position Received: Bed, 4 rail up, Alarm on  General Comment: Pt was pleasant and cooperative to working with therapy.  Home Living:  Home Living  Type of Home: Mobile home  Lives With: Alone  Home Adaptive Equipment: Walker rolling or standard, Wheelchair-manual, Cane  Home Layout: One level  Home Access: Ramped entrance (has R railing)  Bathroom Shower/Tub: Walk-in shower  Bathroom Equipment: Grab bars in shower, Shower chair with back, Hand-held shower hose  Prior Level of Function:  Prior Function Per Pt/Caregiver Report  Level of [place]: Independent with homemaking with ambulation, Independent with ADLs and functional transfers  Receives Help From: Personal care attendant, Family (Reports having home health aide Mondays and Tuesdays for 2-3 hours each day. Cousin)  ADL Assistance: Independent (reports sponge bathing is done by home health aide when she is too weak and her  balance is off)  Homemaking Assistance: Needs assistance  Homemaking Assistance Comments: reports having cousin and home health aide preparing meals for her and she reheats them  Ambulatory Assistance: Independent (reports using both cane and FWW, using FWW more than cane; type of AD used depends on the terrain)  Prior Function Comments: Pt reports having home health aide and cousin driving her to doctors appointments and assisting her with meals and bathing when her balance is off and she's too weak.  Precautions:  Precautions  Medical Precautions: Fall precautions  Precautions Comment: external urinary catheter, telemetry      Date/Time Vitals Session Patient Position Pulse Resp SpO2 BP MAP (mmHg)    07/24/25 1511 --  --  88  16  95 %  126/73  91            Objective   Pain:  Pain Assessment  Pain Assessment: 0-10  0-10 (Numeric) Pain Score: 6 (RN notified)  Pain Type: Chronic pain  Pain Location: Back (bilateral feet and discomfort in stomach)  Pain Interventions: Repositioned, Ambulation/increased activity  Response to Interventions: Content/relaxed, No change in pain  Cognition:  Cognition  Overall Cognitive Status: Within Functional Limits    General Assessments:                  Activity Tolerance  Endurance: Tolerates 10 - 20 min exercise with multiple rests    Sensation  Light Touch: No apparent deficits    Static Sitting Balance  Static Sitting-Balance Support: Feet supported  Static Sitting-Level of Assistance: Minimum assistance  Static Sitting-Comment/Number of Minutes: pt retropulsive with lateral lean towards left    Static Standing Balance  Static Standing-Balance Support: Bilateral upper extremity supported  Static Standing-Level of Assistance: Moderate assistance (x2)  Static Standing-Comment/Number of Minutes: Pt retropulsive in standing; VC to widen base of support and  Dynamic Standing Balance  Dynamic Standing-Balance Support: Bilateral upper extremity supported  Dynamic Standing-Level of  Assistance: Moderate assistance (x2)  Dynamic Standing-Balance: Turning  Dynamic Standing-Comments: initially with FWW; pt reports reaching for items; FWW removed and OT positioned in front of pt  Functional Assessments:  ADL  ADL's Addressed: No    Bed Mobility  Bed Mobility: Yes  Bed Mobility 1  Bed Mobility 1: Supine to sitting  Level of Assistance 1: Moderate assistance, +2  Bed Mobility Comments 1: VC for positioning: bring BLE towards EOB and use of bedrail when needed; mod.Ax2 to bring pt's trunk into upright position and support back to prevent backwards lean  Bed Mobility 2  Bed Mobility  2: Scooting  Level of Assistance 2: +2, Moderate verbal cues  Bed Mobility Comments 2: Mod.Ax2 bring hips towards EOB; pt initially able to bring hips 50% of way; used chux pad to bring hips rest of the way    Transfers  Transfer: Yes  Transfer 1  Transfer From 1: Sit to  Transfer to 1: Stand  Technique 1: Sit to stand  Transfer Device 1: Walker  Transfer Level of Assistance 1: +2, Moderate assistance  Trials/Comments 1: from bed; mod.Ax2 for patient to achieve full upright position  Transfers 2  Transfer From 2: Stand to  Transfer to 2: Sit  Technique 2: Stand pivot (stand step pivot)  Transfer Device 2: Walker  Transfer Level of Assistance 2: Minimum assistance, +2  Trials/Comments 2: to chair; FWW removed, OT positioned in front of pt; VC for positioning, directional changes and controlled lowering         Stairs  Stairs: No (not safe to attempt with pt)  Extremity/Trunk Assessments:  RLE   RLE : Exceptions to WFL  AROM RLE (degrees)  RLE AROM Comment: AROM hip at least 3-/5; knee flexion/extension, ankle plantarflexion/dorsiflexion WFL  Strength RLE  R Ankle Dorsiflexion: 4/5  R Ankle Plantar Flexion: 4/5  LLE   LLE : Exceptions to WFL  AROM LLE (degrees)  LLE AROM Comment: AROM hip at least 3-/5; knee flexion/extension, ankle plantarflexion/dorsiflexion WFL  Strength LLE  L Ankle Dorsiflexion: 4/5  L Ankle Plantar  Flexion: 4/5  Outcome Measures:  [facility] Basic Mobility  Turning from your back to your side while in a flat bed without using bedrails: Total  Moving from lying on your back to sitting on the side of a flat bed without using bedrails: Total  Moving to and from bed to chair (including a wheelchair): Total  Standing up from a chair using your arms (e.g. wheelchair or bedside chair): Total  To walk in hospital room: Total  Climbing 3-5 steps with railing: Total  Basic Mobility - Total Score: 6    Encounter Problems       Encounter Problems (Active)       Balance       Pt will tolerate 5 minutes of dynamic seated balance during dual task activities.        Start:  07/24/25    Expected End:  08/07/25               Balance       Pt will increase static and dynamic standing balance from 2 minutes to 5 minutes enabling her to complete functional transfers.        Start:  07/24/25    Expected End:  08/07/25               Mobility       Pt will walk 25' with FWW and minAx1 to/from bathroom.       Start:  07/24/25    Expected End:  08/07/25               PT Transfers       Pt will complete sit<>stand transfers with FWW and minAx1 enabling her to complete functional tasks.        Start:  07/24/25    Expected End:  08/07/25               Safety       Pt will verbalize and demo understanding of safety awareness with FWW during transfers and when walking.       Start:  07/24/25    Expected End:  08/07/25                   Education Documentation  Body Mechanics, taught by CLIFTON Murcia at 7/24/2025  2:00 PM.  Learner: Patient  Readiness: Acceptance  Method: Explanation  Response: Verbalizes Understanding, Demonstrated Understanding  Comment: Role of acute PT, d/c recommendation, safe body mechanics during transfers and bed mobility; safety awareness in hospital    Mobility Training, taught by CLIFTON Murcia at 7/24/2025  2:00 PM.  Learner: Patient  Readiness: Acceptance  Method: Explanation  Response: Verbalizes  Understanding, Demonstrated Understanding  Comment: Role of acute PT, d/c recommendation, safe body mechanics during transfers and bed mobility; safety awareness in hospital    Education Comments  No comments found.    Completion of this session, clinical decision making, and documentation performed under the supervision/direction of Sarah Henson.

## 2025-07-25 LAB
ALBUMIN SERPL BCP-MCNC: 3.6 G/DL (ref 3.4–5)
ANION GAP SERPL CALC-SCNC: 10 MMOL/L (ref 10–20)
BUN SERPL-MCNC: 22 MG/DL (ref 6–23)
CALCIUM SERPL-MCNC: 9 MG/DL (ref 8.6–10.3)
CHLORIDE SERPL-SCNC: 106 MMOL/L (ref 98–107)
CO2 SERPL-SCNC: 28 MMOL/L (ref 21–32)
CREAT SERPL-MCNC: 0.82 MG/DL (ref 0.5–1.05)
EGFRCR SERPLBLD CKD-EPI 2021: 73 ML/MIN/1.73M*2
ERYTHROCYTE [DISTWIDTH] IN BLOOD BY AUTOMATED COUNT: 12.6 % (ref 11.5–14.5)
GLUCOSE SERPL-MCNC: 89 MG/DL (ref 74–99)
HCT VFR BLD AUTO: 36.1 % (ref 36–46)
HGB BLD-MCNC: 11.7 G/DL (ref 12–16)
MAGNESIUM SERPL-MCNC: 2.51 MG/DL (ref 1.6–2.4)
MCH RBC QN AUTO: 31.3 PG (ref 26–34)
MCHC RBC AUTO-ENTMCNC: 32.4 G/DL (ref 32–36)
MCV RBC AUTO: 97 FL (ref 80–100)
NRBC BLD-RTO: 0 /100 WBCS (ref 0–0)
PHOSPHATE SERPL-MCNC: 3.7 MG/DL (ref 2.5–4.9)
PLATELET # BLD AUTO: 193 X10*3/UL (ref 150–450)
POTASSIUM SERPL-SCNC: 4.1 MMOL/L (ref 3.5–5.3)
RBC # BLD AUTO: 3.74 X10*6/UL (ref 4–5.2)
SODIUM SERPL-SCNC: 140 MMOL/L (ref 136–145)
WBC # BLD AUTO: 4.3 X10*3/UL (ref 4.4–11.3)

## 2025-07-25 PROCEDURE — 83735 ASSAY OF MAGNESIUM: CPT

## 2025-07-25 PROCEDURE — 85027 COMPLETE CBC AUTOMATED: CPT

## 2025-07-25 PROCEDURE — G0378 HOSPITAL OBSERVATION PER HR: HCPCS

## 2025-07-25 PROCEDURE — 2500000004 HC RX 250 GENERAL PHARMACY W/ HCPCS (ALT 636 FOR OP/ED)

## 2025-07-25 PROCEDURE — 96372 THER/PROPH/DIAG INJ SC/IM: CPT

## 2025-07-25 PROCEDURE — 80069 RENAL FUNCTION PANEL: CPT

## 2025-07-25 PROCEDURE — 94760 N-INVAS EAR/PLS OXIMETRY 1: CPT

## 2025-07-25 PROCEDURE — 36415 COLL VENOUS BLD VENIPUNCTURE: CPT

## 2025-07-25 PROCEDURE — 2500000001 HC RX 250 WO HCPCS SELF ADMINISTERED DRUGS (ALT 637 FOR MEDICARE OP)

## 2025-07-25 PROCEDURE — 99232 SBSQ HOSP IP/OBS MODERATE 35: CPT

## 2025-07-25 RX ADMIN — ENOXAPARIN SODIUM 40 MG: 100 INJECTION SUBCUTANEOUS at 08:57

## 2025-07-25 RX ADMIN — AMLODIPINE BESYLATE 5 MG: 5 TABLET ORAL at 08:57

## 2025-07-25 RX ADMIN — KETOTIFEN FUMARATE 1 DROP: 0.25 SOLUTION/ DROPS OPHTHALMIC at 08:57

## 2025-07-25 RX ADMIN — SENNOSIDES AND DOCUSATE SODIUM 1 TABLET: 50; 8.6 TABLET ORAL at 20:40

## 2025-07-25 RX ADMIN — KETOTIFEN FUMARATE 1 DROP: 0.25 SOLUTION/ DROPS OPHTHALMIC at 20:40

## 2025-07-25 ASSESSMENT — PAIN SCALES - GENERAL
PAINLEVEL_OUTOF10: 0 - NO PAIN
PAINLEVEL_OUTOF10: 0 - NO PAIN

## 2025-07-25 ASSESSMENT — COGNITIVE AND FUNCTIONAL STATUS - GENERAL
TOILETING: A LITTLE
CLIMB 3 TO 5 STEPS WITH RAILING: A LOT
EATING MEALS: A LITTLE
STANDING UP FROM CHAIR USING ARMS: A LITTLE
PERSONAL GROOMING: A LITTLE
MOVING FROM LYING ON BACK TO SITTING ON SIDE OF FLAT BED WITH BEDRAILS: A LITTLE
MOVING TO AND FROM BED TO CHAIR: A LITTLE
WALKING IN HOSPITAL ROOM: A LOT
HELP NEEDED FOR BATHING: A LITTLE
TURNING FROM BACK TO SIDE WHILE IN FLAT BAD: A LITTLE
DAILY ACTIVITIY SCORE: 18
MOBILITY SCORE: 16
DRESSING REGULAR UPPER BODY CLOTHING: A LITTLE
DRESSING REGULAR LOWER BODY CLOTHING: A LITTLE

## 2025-07-25 ASSESSMENT — PAIN - FUNCTIONAL ASSESSMENT
PAIN_FUNCTIONAL_ASSESSMENT: 0-10
PAIN_FUNCTIONAL_ASSESSMENT: 0-10

## 2025-07-25 NOTE — PROGRESS NOTES
07/25/25 0942   Discharge Planning   Living Arrangements Alone   Support Systems Family members;Friends/neighbors;Home care staff  (Patient has a private duty HHA on Mondays/2 hours and Tuesdays/3 hours.)   Assistance Needed Alert and oriented x 4, Independent as much as possible, Doesn't drive (family and HHA transports), Walker, Wheel chair, Grab bars, Bulit in shower chair, Raised toilet, Room air at baseline and currently, No CPAP/Bipap, PCP-Dr. Tenzin Waldrop, DO   Type of Residence Private residence  (Mobile Home in a mobile home park)   Number of Stairs to Enter Residence 0  (ramp)   Number of Stairs Within Residence 0   Do you have animals or pets at home? No   Who is requesting discharge planning? Provider   Home or Post Acute Services Post acute facilities (Rehab/SNF/etc)   Type of Post Acute Facility Services Skilled nursing   Expected Discharge Disposition SNF  (Spoke with patient regarding PT/OT recommendation of MOD intensity, SNF at [place]. Patient agreeable to SNF. PAQN list provided to patient. [facility] preferenced [facility], referral sent in [facility], awaiting response.)   Does the patient need discharge transport arranged? Yes   [facility] coordination needed? Yes   Has discharge transport been arranged? No   Patient Choice   Provider Choice list and CMS website (https://medicare.gov/care-compare#search) for post-acute Quality and Resource Measure Data were provided and reviewed with: Family;Patient   Patient / Family choosing to utilize agency / facility established prior to hospitalization No   Stroke Family Assessment   Stroke Family Assessment Needed No   Intensity of Service   Intensity of Service 0-30 min        07/25/25 1459   Discharge Planning   Expected Discharge Disposition SNF  ([facility] unable [facility], no beds available. Patient reviewed list with hussein and preferenced [facility]. Referral sent in [facility], awaiting response. Patient will need a precert.)

## 2025-07-25 NOTE — PROGRESS NOTES
Department of Hospital Medicine  Daily Progress Note   Hospital Day: 3       Name:Kaitlin Robles, AGE: 80 y.o., GENDER: female, MRN: [id number], ROOM: 220/220-B   CODE STATUS: DNR and No Intubation  Attending Physician: Glenn Gonzalez DO  Resident: Damari Dejesus DO        Chief Complaint     Chief Complaint   Patient presents with    Weakness, Gen     States she was unable to get out of bed today due to weakness. Denies pain anywhere. States she is a DNR- CC        Interval History   Kaitlin Robles is a 80 y.o. year old female patient on Hospital Day: 3    Overnight events:  No acute events overnight  Seen and examined at bedside this morning.     Subjective    Patient states that she feels well. She had 3 bowel movements overnight. Complains of lesion on lower lip causing some discomfort, otherwise no complaints.   Meds    Scheduled medications  Scheduled Medications[1]  Continuous medications  Continuous Medications[2]  PRN medications  PRN Medications[3]     Objective    Exam   Physical Exam  Constitutional:       General: She is not in acute distress.     Appearance: Normal appearance. She is not ill-appearing, toxic-appearing or diaphoretic.      Comments: Frail appearing     HENT:      Mouth/Throat:      Comments: Lesion on lower lip vermilion border    Cardiovascular:      Rate and Rhythm: Normal rate and regular rhythm.      Pulses: Normal pulses.      Heart sounds: Normal heart sounds. No murmur heard.     No friction rub. No gallop.   Pulmonary:      Effort: Pulmonary effort is normal. No respiratory distress.      Breath sounds: Normal breath sounds. No stridor. No wheezing, rhonchi or rales.   Chest:      Chest wall: No tenderness.   Abdominal:      General: Abdomen is flat. There is no distension.      Palpations: Abdomen is soft. There is no mass.      Tenderness: There is abdominal tenderness (diffusely). There is no guarding or rebound.      Hernia: No hernia is present.     Musculoskeletal:          General: No swelling, tenderness, deformity or signs of injury.      Right lower leg: No edema.      Left lower leg: No edema.      Comments: Cogwheel rigidity     Skin:     General: Skin is warm and dry.      Coloration: Skin is not jaundiced or pale.      Findings: No bruising, erythema, lesion or rash.     Neurological:      General: No focal deficit present.      Mental Status: She is alert and oriented to person, place, and time.     Psychiatric:         Mood and Affect: Mood normal.         Behavior: Behavior normal.          Vitals         7/24/2025     3:50 AM 7/24/2025     7:57 AM 7/24/2025     9:00 AM 7/24/2025     3:11 PM 7/24/2025     7:00 PM 7/25/2025     3:40 AM 7/25/2025     7:27 AM   Vitals   Systolic 130 154  126 133 126 133   Diastolic 63 85  73 75 68 76   BP Location Left arm      Left arm   Heart Rate 95 84 85 88 85 95 80   Temp 36.6 °C (97.9 °F) 36.6 °C (97.9 °F)  36.6 °C (97.9 °F) 36.4 °C (97.5 °F) 36.8 °C (98.2 °F) 36.5 °C (97.7 °F)   Resp 15 16  16 20 16 18        Intake/Output Summary (Last 24 hours) at 7/25/2025 0852  Last data filed at 7/25/2025 0828  Gross per 24 hour   Intake 400 ml   Output 350 ml   Net 50 ml       Labs   Labs:   Results from last 72 hours   Lab Units 07/25/25  0654 07/24/25  0627 07/23/25  1734   SODIUM mmol/L 140 141 140   POTASSIUM mmol/L 4.1 3.8 3.9   CHLORIDE mmol/L 106 108* 104   CO2 mmol/L 28 26 26   BUN mg/dL 22 25* 21   CREATININE mg/dL 0.82 0.89 0.90   GLUCOSE mg/dL 89 88 104*   CALCIUM mg/dL 9.0 8.8 9.6   ANION GAP mmol/L 10 11 14   EGFR mL/min/1.73m*2 73 66 66   PHOSPHORUS mg/dL 3.7 4.0  --       Results from last 72 hours   Lab Units 07/25/25  0654 07/24/25  0627 07/23/25  1734   WBC AUTO x10*3/uL 4.3* 5.2 7.9   HEMOGLOBIN g/dL 11.7* 10.9* 12.5   HEMATOCRIT % 36.1 34.5* 38.1   PLATELETS AUTO x10*3/uL 193 193 222   NEUTROS PCT AUTO %  --   --  75.7   LYMPHS PCT AUTO %  --   --  14.4   MONOS PCT AUTO %  --   --  8.4   EOS PCT AUTO %  --   --  1.1       Lab Results   Component Value Date    CALCIUM 9.0 07/25/2025    PHOS 3.7 07/25/2025        Micro/ID:   No results found for the last 90 days.        Images    Imaging  US gallbladder  Result Date: 7/24/2025  No evidence of cholelithiasis, cholecystitis, or biliary dilatation.   Signed by: Jim Calvert 7/24/2025 12:53 AM Dictation workstation:   [id number]    CT abdomen pelvis wo IV contrast  Result Date: 7/23/2025  1.Moderate stool burden within the colon. Diverticular disease without discrete evidence of diverticulitis. 2.Question mild gallbladder wall thickening.  Consider ultrasound for further evaluation if clinically indicated. 3.Additional findings as above. Signed by Ramu Ceja MD    CT head wo IV contrast  Result Date: 7/23/2025  No evidence of acute intracranial hemorrhage or mass effect.       MACRO: None   Signed by: [place] 7/23/2025 6:46 PM Dictation workstation:   [id number]    XR chest 2 views  Result Date: 7/23/2025  No evidence of acute intrathoracic abnormality.   Signed by: Saul Graf 7/23/2025 6:10 PM Dictation workstation:   [id number]      Cardiology, Vascular, and Other Imaging  ECG 12 lead  Result Date: 7/24/2025  Sinus tachycardia Possible Left atrial enlargement Left axis deviation Anterior infarct (cited on or before 18-MAR-2024) Abnormal ECG When compared with ECG of 18-MAR-2024 18:06, QRS axis Shifted left        Assessment    Assessment and Plan    Kaitlin Robles is a 80 y.o. female admitted on 7/23/2025     Updates 07/25/25  :  No bowel movements yet    ACUTE MEDICAL ISSUES:  #Generalized weakness  - Ongoing problem, worsened when she was unable to get out of bed. Lives independently with aide assist 2 x weekly  - VSS, no metabolic derangements, UA negative  PLAN:  - PT/OT consulted, recommending moderate intensity therapy       ADDRESSED/ RESOLVED MEDICAL ISSUES:  #Constipation  - CT showed moderate stool burden. Last BM reportedly 1 week ago  - S/p lactulose in  ED  - Patient reported BM x 3   PLAN:  - Continue miralax and lauri-colace PRN  - Continue to monitor for BM      CHRONIC MEDICAL ISSUES:  # HTN: Continue amlodipine  # Afib: not on AC as eliquis was giving her GI issues  # Parkinson's: continue home rasagiline      Fluids: PO intake & IVF PRN  Electrolytes: Replete PRN  Nutrition: Regular diet  Antimicrobials: None  DVT ppx: Lovenox subq  GI ppx: None  Catheter: None  Lines: PIV  Supplemental Oxygen: None  Emergency Contact: Extended Emergency Contact Information  Primary Emergency Contact: Roel Wilson  Home Phone: [phone]  Relation: Relative  Secondary Emergency Contact: MINH ALVARES  Home Phone: [phone]  Relation: Other   Code: DNR and No Intubation     Disposition: 80 y.o. female admitted for generalized weakness. Discharge pending PT/OT evaluation and placement.     Damari Dejesus,    Internal Medicine, PGY- 1  07/25/25 at 8:52 AM                      [1] amLODIPine, 5 mg, oral, Daily  enoxaparin, 40 mg, subcutaneous, Daily  ketotifen, 1 drop, Right Eye, BID  polyethylene glycol, 17 g, oral, Daily  rasagiline, 0.5 mg, oral, Daily  sennosides-docusate sodium, 1 tablet, oral, Nightly     [2]    [3] PRN medications: acetaminophen

## 2025-07-26 LAB
ALBUMIN SERPL BCP-MCNC: 3.6 G/DL (ref 3.4–5)
ANION GAP SERPL CALC-SCNC: 10 MMOL/L (ref 10–20)
BUN SERPL-MCNC: 20 MG/DL (ref 6–23)
CALCIUM SERPL-MCNC: 9 MG/DL (ref 8.6–10.3)
CHLORIDE SERPL-SCNC: 105 MMOL/L (ref 98–107)
CO2 SERPL-SCNC: 27 MMOL/L (ref 21–32)
CREAT SERPL-MCNC: 0.79 MG/DL (ref 0.5–1.05)
EGFRCR SERPLBLD CKD-EPI 2021: 77 ML/MIN/1.73M*2
ERYTHROCYTE [DISTWIDTH] IN BLOOD BY AUTOMATED COUNT: 12.5 % (ref 11.5–14.5)
GLUCOSE SERPL-MCNC: 87 MG/DL (ref 74–99)
HCT VFR BLD AUTO: 36.4 % (ref 36–46)
HGB BLD-MCNC: 11.7 G/DL (ref 12–16)
MAGNESIUM SERPL-MCNC: 2.34 MG/DL (ref 1.6–2.4)
MCH RBC QN AUTO: 31 PG (ref 26–34)
MCHC RBC AUTO-ENTMCNC: 32.1 G/DL (ref 32–36)
MCV RBC AUTO: 97 FL (ref 80–100)
NRBC BLD-RTO: 0 /100 WBCS (ref 0–0)
PHOSPHATE SERPL-MCNC: 4 MG/DL (ref 2.5–4.9)
PLATELET # BLD AUTO: 204 X10*3/UL (ref 150–450)
POTASSIUM SERPL-SCNC: 3.9 MMOL/L (ref 3.5–5.3)
RBC # BLD AUTO: 3.77 X10*6/UL (ref 4–5.2)
SODIUM SERPL-SCNC: 138 MMOL/L (ref 136–145)
WBC # BLD AUTO: 4.5 X10*3/UL (ref 4.4–11.3)

## 2025-07-26 PROCEDURE — 84100 ASSAY OF PHOSPHORUS: CPT

## 2025-07-26 PROCEDURE — 2500000002 HC RX 250 W HCPCS SELF ADMINISTERED DRUGS (ALT 637 FOR MEDICARE OP, ALT 636 FOR OP/ED)

## 2025-07-26 PROCEDURE — G0378 HOSPITAL OBSERVATION PER HR: HCPCS

## 2025-07-26 PROCEDURE — 2500000001 HC RX 250 WO HCPCS SELF ADMINISTERED DRUGS (ALT 637 FOR MEDICARE OP)

## 2025-07-26 PROCEDURE — 97530 THERAPEUTIC ACTIVITIES: CPT | Mod: GO,CO

## 2025-07-26 PROCEDURE — 36415 COLL VENOUS BLD VENIPUNCTURE: CPT

## 2025-07-26 PROCEDURE — 85027 COMPLETE CBC AUTOMATED: CPT

## 2025-07-26 PROCEDURE — 2500000004 HC RX 250 GENERAL PHARMACY W/ HCPCS (ALT 636 FOR OP/ED)

## 2025-07-26 PROCEDURE — 97530 THERAPEUTIC ACTIVITIES: CPT | Mod: GP,CQ

## 2025-07-26 PROCEDURE — 99232 SBSQ HOSP IP/OBS MODERATE 35: CPT

## 2025-07-26 PROCEDURE — 83735 ASSAY OF MAGNESIUM: CPT

## 2025-07-26 PROCEDURE — 97116 GAIT TRAINING THERAPY: CPT | Mod: GP,CQ

## 2025-07-26 PROCEDURE — 94760 N-INVAS EAR/PLS OXIMETRY 1: CPT

## 2025-07-26 RX ADMIN — KETOTIFEN FUMARATE 1 DROP: 0.25 SOLUTION/ DROPS OPHTHALMIC at 21:21

## 2025-07-26 RX ADMIN — AMLODIPINE BESYLATE 5 MG: 5 TABLET ORAL at 08:05

## 2025-07-26 RX ADMIN — KETOTIFEN FUMARATE 1 DROP: 0.25 SOLUTION/ DROPS OPHTHALMIC at 08:05

## 2025-07-26 RX ADMIN — SENNOSIDES AND DOCUSATE SODIUM 1 TABLET: 50; 8.6 TABLET ORAL at 21:21

## 2025-07-26 RX ADMIN — POLYETHYLENE GLYCOL 3350 17 G: 17 POWDER, FOR SOLUTION ORAL at 08:05

## 2025-07-26 RX ADMIN — ACETAMINOPHEN 650 MG: 325 TABLET ORAL at 03:36

## 2025-07-26 RX ADMIN — RASAGILINE 0.5 MG: 0.5 TABLET ORAL at 08:05

## 2025-07-26 ASSESSMENT — COGNITIVE AND FUNCTIONAL STATUS - GENERAL
DRESSING REGULAR UPPER BODY CLOTHING: A LITTLE
MOBILITY SCORE: 16
STANDING UP FROM CHAIR USING ARMS: A LITTLE
WALKING IN HOSPITAL ROOM: A LOT
TOILETING: A LOT
DRESSING REGULAR UPPER BODY CLOTHING: A LITTLE
DRESSING REGULAR LOWER BODY CLOTHING: A LOT
DRESSING REGULAR LOWER BODY CLOTHING: A LITTLE
MOVING TO AND FROM BED TO CHAIR: A LITTLE
WALKING IN HOSPITAL ROOM: A LOT
MOVING TO AND FROM BED TO CHAIR: TOTAL
HELP NEEDED FOR BATHING: A LITTLE
EATING MEALS: A LITTLE
MOBILITY SCORE: 16
TURNING FROM BACK TO SIDE WHILE IN FLAT BAD: A LOT
HELP NEEDED FOR BATHING: A LITTLE
PERSONAL GROOMING: A LITTLE
EATING MEALS: A LITTLE
DAILY ACTIVITIY SCORE: 15
PERSONAL GROOMING: A LITTLE
DAILY ACTIVITIY SCORE: 18
DRESSING REGULAR LOWER BODY CLOTHING: A LITTLE
TOILETING: A LITTLE
WALKING IN HOSPITAL ROOM: TOTAL
HELP NEEDED FOR BATHING: A LOT
STANDING UP FROM CHAIR USING ARMS: TOTAL
TURNING FROM BACK TO SIDE WHILE IN FLAT BAD: A LITTLE
MOBILITY SCORE: 8
STANDING UP FROM CHAIR USING ARMS: A LITTLE
MOVING FROM LYING ON BACK TO SITTING ON SIDE OF FLAT BED WITH BEDRAILS: A LITTLE
MOVING TO AND FROM BED TO CHAIR: A LITTLE
EATING MEALS: A LITTLE
PERSONAL GROOMING: A LITTLE
CLIMB 3 TO 5 STEPS WITH RAILING: TOTAL
TURNING FROM BACK TO SIDE WHILE IN FLAT BAD: A LITTLE
MOVING FROM LYING ON BACK TO SITTING ON SIDE OF FLAT BED WITH BEDRAILS: A LOT
MOVING FROM LYING ON BACK TO SITTING ON SIDE OF FLAT BED WITH BEDRAILS: A LITTLE
DRESSING REGULAR UPPER BODY CLOTHING: A LITTLE
CLIMB 3 TO 5 STEPS WITH RAILING: A LOT
TOILETING: A LITTLE
CLIMB 3 TO 5 STEPS WITH RAILING: A LOT
DAILY ACTIVITIY SCORE: 18

## 2025-07-26 ASSESSMENT — PAIN - FUNCTIONAL ASSESSMENT
PAIN_FUNCTIONAL_ASSESSMENT: 0-10

## 2025-07-26 ASSESSMENT — PAIN DESCRIPTION - LOCATION: LOCATION: HEAD

## 2025-07-26 ASSESSMENT — PAIN SCALES - GENERAL
PAINLEVEL_OUTOF10: 0 - NO PAIN
PAINLEVEL_OUTOF10: 5 - MODERATE PAIN
PAINLEVEL_OUTOF10: 0 - NO PAIN

## 2025-07-26 ASSESSMENT — PAIN DESCRIPTION - DESCRIPTORS: DESCRIPTORS: ACHING

## 2025-07-26 NOTE — PROGRESS NOTES
Department of Hospital Medicine  Daily Progress Note   Hospital Day: 4       Name:Kaitlin Robles, AGE: 80 y.o., GENDER: female, MRN: [id number], ROOM: 220/220-B   CODE STATUS: DNR and No Intubation  Attending Physician: Glenn Gonzalez DO  Resident: Damari Dejesus DO        Chief Complaint     Chief Complaint   Patient presents with    Weakness, Gen     States she was unable to get out of bed today due to weakness. Denies pain anywhere. States she is a DNR- CC        Interval History   Kaitlin Robles is a 80 y.o. year old female patient on Hospital Day: 4    Overnight events:  No acute events overnight  Seen and examined at bedside this morning.     Subjective    Patient states that she feels well. No complaints at this time.  Meds    Scheduled medications  Scheduled Medications[1]  Continuous medications  Continuous Medications[2]  PRN medications  PRN Medications[3]     Objective    Exam   Physical Exam  Constitutional:       General: She is not in acute distress.     Appearance: Normal appearance. She is not ill-appearing, toxic-appearing or diaphoretic.      Comments: Frail appearing     HENT:      Mouth/Throat:      Comments: Lesion on lower lip vermilion border    Cardiovascular:      Rate and Rhythm: Normal rate and regular rhythm.      Pulses: Normal pulses.      Heart sounds: Normal heart sounds. No murmur heard.     No friction rub. No gallop.   Pulmonary:      Effort: Pulmonary effort is normal. No respiratory distress.      Breath sounds: Normal breath sounds. No stridor. No wheezing, rhonchi or rales.   Chest:      Chest wall: No tenderness.   Abdominal:      General: Abdomen is flat. There is no distension.      Palpations: Abdomen is soft. There is no mass.      Tenderness: There is no abdominal tenderness. There is no guarding or rebound.      Hernia: No hernia is present.     Musculoskeletal:         General: No swelling, tenderness, deformity or signs of injury.      Right lower leg: No  edema.      Left lower leg: No edema.      Comments: Cogwheel rigidity     Skin:     General: Skin is warm and dry.      Coloration: Skin is not jaundiced or pale.      Findings: No bruising, erythema, lesion or rash.     Neurological:      General: No focal deficit present.      Mental Status: She is alert and oriented to person, place, and time.     Psychiatric:         Mood and Affect: Mood normal.         Behavior: Behavior normal.          Vitals         7/25/2025     7:27 AM 7/25/2025     4:14 PM 7/25/2025     7:12 PM 7/26/2025     3:05 AM 7/26/2025     5:46 AM 7/26/2025     6:30 AM 7/26/2025     7:32 AM   Vitals   Systolic 133 124 122 124   148   Diastolic 76 78 68 67   75   BP Location Left arm Right arm Left arm Left arm   Left arm   Heart Rate 80 98 93 73 77 82 71   Temp 36.5 °C (97.7 °F) 37 °C (98.6 °F) 36 °C (96.8 °F) 35.8 °C (96.4 °F)   36.4 °C (97.5 °F)   Resp 18 16 17 17   18        Intake/Output Summary (Last 24 hours) at 7/26/2025 1216  Last data filed at 7/26/2025 0836  Gross per 24 hour   Intake 480 ml   Output 350 ml   Net 130 ml       Labs   Labs:   Results from last 72 hours   Lab Units 07/26/25  0555 07/25/25  0654 07/24/25  0627   SODIUM mmol/L 138 140 141   POTASSIUM mmol/L 3.9 4.1 3.8   CHLORIDE mmol/L 105 106 108*   CO2 mmol/L 27 28 26   BUN mg/dL 20 22 25*   CREATININE mg/dL 0.79 0.82 0.89   GLUCOSE mg/dL 87 89 88   CALCIUM mg/dL 9.0 9.0 8.8   ANION GAP mmol/L 10 10 11   EGFR mL/min/1.73m*2 77 73 66   PHOSPHORUS mg/dL 4.0 3.7 4.0      Results from last 72 hours   Lab Units 07/26/25  0555 07/25/25  0654 07/24/25  0627 07/23/25  1734   WBC AUTO x10*3/uL 4.5 4.3* 5.2 7.9   HEMOGLOBIN g/dL 11.7* 11.7* 10.9* 12.5   HEMATOCRIT % 36.4 36.1 34.5* 38.1   PLATELETS AUTO x10*3/uL 204 193 193 222   NEUTROS PCT AUTO %  --   --   --  75.7   LYMPHS PCT AUTO %  --   --   --  14.4   MONOS PCT AUTO %  --   --   --  8.4   EOS PCT AUTO %  --   --   --  1.1      Lab Results   Component Value Date    CALCIUM  9.0 07/26/2025    PHOS 4.0 07/26/2025        Micro/ID:   No results found for the last 90 days.        Images    Imaging  US gallbladder  Result Date: 7/24/2025  No evidence of cholelithiasis, cholecystitis, or biliary dilatation.   Signed by: Jim Calvert 7/24/2025 12:53 AM Dictation workstation:   [id number]    CT abdomen pelvis wo IV contrast  Result Date: 7/23/2025  1.Moderate stool burden within the colon. Diverticular disease without discrete evidence of diverticulitis. 2.Question mild gallbladder wall thickening.  Consider ultrasound for further evaluation if clinically indicated. 3.Additional findings as above. Signed by Ramu Ceja MD    CT head wo IV contrast  Result Date: 7/23/2025  No evidence of acute intracranial hemorrhage or mass effect.       MACRO: None   Signed by: H[place]ham [place] 7/23/2025 6:46 PM Dictation workstation:   [id number]    XR chest 2 views  Result Date: 7/23/2025  No evidence of acute intrathoracic abnormality.   Signed by: Saul Graf 7/23/2025 6:10 PM Dictation workstation:   [id number]      Cardiology, Vascular, and Other Imaging  ECG 12 lead  Result Date: 7/24/2025  Sinus tachycardia Possible Left atrial enlargement Left axis deviation Anterior infarct (cited on or before 18-MAR-2024) Abnormal ECG When compared with ECG of 18-MAR-2024 18:06, QRS axis Shifted left        Assessment    Assessment and Plan    Kaitlin Robles is a 80 y.o. female admitted on 7/23/2025     Updates 07/26/25  :  Pending precert for SNF    ACUTE MEDICAL ISSUES:  #Generalized weakness  - Ongoing problem, worsened when she was unable to get out of bed. Lives independently with aide assist 2 x weekly  - VSS, no metabolic derangements, UA negative  PLAN:  - PT/OT consulted, recommending moderate intensity therapy       ADDRESSED/ RESOLVED MEDICAL ISSUES:  #Constipation  - CT showed moderate stool burden. Last BM reportedly 1 week ago  - S/p lactulose in ED  - Patient reported BM x 3   PLAN:  -  Continue miralax and lauri-colace PRN  - Continue to monitor for BM      CHRONIC MEDICAL ISSUES:  # HTN: Continue amlodipine  # Afib: not on AC as eliquis was giving her GI issues  # Parkinson's: continue home rasagiline      Fluids: PO intake & IVF PRN  Electrolytes: Replete PRN  Nutrition: Regular diet  Antimicrobials: None  DVT ppx: Lovenox subq  GI ppx: None  Catheter: None  Lines: PIV  Supplemental Oxygen: None  Emergency Contact: Extended Emergency Contact Information  Primary Emergency Contact: Roel Wilson  Home Phone: [phone]  Relation: Relative  Secondary Emergency Contact: MINH ALVARES  Home Phone: [phone]  Relation: Other   Code: DNR and No Intubation     Disposition: 80 y.o. female admitted for generalized weakness. Discharge pending PT/OT evaluation and placement.     Damari Dejesus,    Internal Medicine, PGY- 1  07/26/25 at 12:16 PM                        [1] amLODIPine, 5 mg, oral, Daily  enoxaparin, 40 mg, subcutaneous, Daily  ketotifen, 1 drop, Right Eye, BID  polyethylene glycol, 17 g, oral, Daily  rasagiline, 0.5 mg, oral, Daily  sennosides-docusate sodium, 1 tablet, oral, Nightly     [2]    [3] PRN medications: acetaminophen

## 2025-07-26 NOTE — PROGRESS NOTES
Occupational Therapy    Occupational Therapy Treatment    Name: Kaitlin Robles  MRN: [id number]  Department: [address]  Room: [facility]-B  Date: 07/26/25  Time Calculation  Start Time: 0841  Stop Time: 0905  Time Calculation (min): 24 min    Assessment:  OT Assessment: Pt continues to present with decreased ADL performance, decreased functional mobility, decreased endurance. Recommend continued skilled OT at a mod intensity to maximize pt safety and independence after discharge.  Prognosis: Good  Barriers to Discharge Home: Caregiver assistance, Physical needs  Caregiver Assistance: Patient lives alone and/or does not have reliable caregiver assistance  Physical Needs: 24hr mobility assistance needed, 24hr ADL assistance needed, High falls risk due to function or environment  Evaluation/Treatment Tolerance: Patient tolerated treatment well  Medical Staff Made Aware: Yes  End of Session Communication: Bedside nurse  End of Session Patient Position: Up in chair, Alarm on (call light provided)  Plan:  Treatment Interventions: ADL retraining, Functional transfer training, Endurance training, UE strengthening/ROM  OT Frequency: 3 times per week (During this acute inpatient hospitalization. Based on current functional status & rehab potential, patient is anticipated to tolerate and benefit from 5+ days/wk of skilled rehabilitative therapy after discharge from this acute inpatient hospitalization.)  OT Discharge Recommendations: Moderate intensity level of continued care  Equipment Recommended upon Discharge: Wheeled walker, Straight cane (owns)  OT Recommended Transfer Status: Assist of 2  OT - OK to Discharge: Yes (Per OT poc)    Subjective     OT Visit Info:  OT Received On: 07/26/25  General:  General  Reason for Referral: Pt is 80 yr female with generalized weakness, constipation, Parkinson's disease and unspecific whether dyskinesia present resulting in impaired safety awareness as well as impaired mobility and  "gait.  Referred By: Faby Babcock DO  Past Medical History Relevant to Rehab: parkinson's, HTN, IBS, OA, Afib not on anticoagulation  Family/Caregiver Present: No  Prior to Session Communication: Bedside nurse  Patient Position Received: Bed, 3 rail up, Alarm on  General Comment: Pt agreeable to tx session, pt pleasant/cooperative during session and appears motivated  Precautions:  Medical Precautions: Fall precautions  Precautions Comment: tele (no purewick this date.)     Date/Time Vitals Session Patient Position Pulse Resp SpO2 BP MAP (mmHg)    07/26/25 0800 --  --  --  --  96 %  --  --      Pain Assessment:  Pain Assessment  Pain Assessment: 0-10  0-10 (Numeric) Pain Score: 0 - No pain    Objective   Cognition:  Overall Cognitive Status: Within Functional Limits  Arousal/Alertness: Appropriate responses to stimuli  Orientation Level: Oriented X4  Activities of Daily Living:      LE Dressing  LE Dressing: Yes  LE Dressing Where Assessed: Edge of bed  LE Dressing Comments: Pt doffed LB socks with SBA and donned LB shoes with s/u with increased time    Bed Mobility/Transfers: Bed Mobility  Bed Mobility: Yes  Bed Mobility 1  Bed Mobility 1: Supine to sitting  Level of Assistance 1: Close supervision, Contact guard, Minimal verbal cues  Bed Mobility Comments 1: Pt stating \"let me try to do it myself\" vc to scoot/weightshift frwd  Bed Mobility 2  Bed Mobility  2: Scooting  Level of Assistance 2: Close supervision    Transfers  Transfer: Yes (with increased time and multiple trials)  Transfer 1  Transfer From 1: Sit to  Transfer to 1: Stand  Technique 1: Sit to stand  Transfer Device 1: Walker  Transfer Level of Assistance 1: Contact guard  Trials/Comments 1: multiple trials performed  Transfers 2  Transfer From 2: Stand to  Transfer to 2: Sit  Technique 2: Stand to sit  Transfer Device 2: Walker  Transfer Level of Assistance 2: Contact guard  Transfers 3  Transfer From 3: Stand to, Chair with arms to  Transfer to " 3: Chair with arms, Sit  Technique 3: Stand pivot  Transfer Device 3: Walker  Transfer Level of Assistance 3: Contact guard, Moderate verbal cues  Trials/Comments 3: VC for sequecning during pivot and to reach back and slowly descent into chair    Functional Mobility:  Functional Mobility  Functional Mobility Performed: No  Sitting Balance:  Static Sitting Balance  Static Sitting-Balance Support: Feet supported  Static Sitting-Level of Assistance: Close supervision  Dynamic Sitting Balance  Dynamic Sitting-Balance Support: Right upper extremity supported, Left upper extremity supported (intermittently)  Dynamic Sitting-Level of Assistance: Close supervision  Standing Balance:  Static Standing Balance  Static Standing-Balance Support: Bilateral upper extremity supported  Static Standing-Level of Assistance: Contact guard  Static Standing-Comment/Number of Minutes: retrolean however, able to regain balance with CGA and ~2 min stand tolerance  Strength:  Strength  Strength Comments: BUE3+/5    Outcome Measures:  [facility] Daily Activity  Putting on and taking off regular lower body clothing: A lot  Bathing (including washing, rinsing, drying): A lot  Putting on and taking off regular upper body clothing: A little  Toileting, which includes using toilet, bedpan or urinal: A lot  Taking care of personal grooming such as brushing teeth: A little  Eating Meals: A little  Daily Activity - Total Score: 15    Education Documentation  Body Mechanics, taught by ARNOLDO Skinner at 7/26/2025  9:44 AM.  Learner: Patient  Readiness: Acceptance  Method: Explanation  Response: Verbalizes Understanding    Precautions, taught by ARNOLDO Skinner at 7/26/2025  9:44 AM.  Learner: Patient  Readiness: Acceptance  Method: Explanation  Response: Verbalizes Understanding    ADL Training, taught by ARNOLDO Skinner at 7/26/2025  9:44 AM.  Learner: Patient  Readiness: Acceptance  Method: Explanation  Response: Verbalizes  Understanding    Goals:  Encounter Problems       Encounter Problems (Active)       OT Goals       Pt will demo Grooming/UE ADL completion with setup, using adaptive strategies and energy conservation techniques as applicable.  (Progressing)       Start:  07/24/25    Expected End:  08/07/25            Pt will demo LE ADL completion with Mod A, using AE if needed.  (Progressing)       Start:  07/24/25    Expected End:  08/07/25            Pt will increase endurance to tolerate 15min of OOB activity with no more than 1 rest break in order to increase ability to engage in ADL completion.  (Progressing)       Start:  07/24/25    Expected End:  08/07/25            Pt will complete nyub-kj-oabg transfers using LRD in preparation for ADLs with min A  (Progressing)       Start:  07/24/25    Expected End:  08/07/25            Pt will increase static/dynamic sitting balance to Good to increase safety and independence with functional task completion.   (Progressing)       Start:  07/24/25    Expected End:  08/07/25

## 2025-07-26 NOTE — CARE PLAN
The patient's goals for the shift include  Problem: Pain - Adult  Goal: Verbalizes/displays adequate comfort level or baseline comfort level  Outcome: Progressing     Problem: Safety - Adult  Goal: Free from fall injury  Outcome: Progressing     Problem: Discharge Planning  Goal: Discharge to home or other facility with appropriate resources  Outcome: Progressing     Problem: Chronic Conditions and Co-morbidities  Goal: Patient's chronic conditions and co-morbidity symptoms are monitored and maintained or improved  Outcome: Progressing     Problem: Nutrition  Goal: Nutrient intake appropriate for maintaining nutritional needs  Outcome: Progressing     Problem: Skin  Goal: Decreased wound size/increased tissue granulation at next dressing change  Outcome: Progressing  Goal: Participates in plan/prevention/treatment measures  Outcome: Progressing  Goal: Prevent/manage excess moisture  Outcome: Progressing  Goal: Prevent/minimize sheer/friction injuries  Outcome: Progressing  Goal: Promote/optimize nutrition  Outcome: Progressing  Goal: Promote skin healing  Outcome: Progressing     Problem: Fall/Injury  Goal: Not fall by end of shift  Outcome: Progressing  Goal: Be free from injury by end of the shift  Outcome: Progressing  Goal: Verbalize understanding of personal risk factors for fall in the hospital  Outcome: Progressing  Goal: Verbalize understanding of risk factor reduction measures to prevent injury from fall in the home  Outcome: Progressing  Goal: Use assistive devices by end of the shift  Outcome: Progressing  Goal: Pace activities to prevent fatigue by end of the shift  Outcome: Progressing

## 2025-07-26 NOTE — PROGRESS NOTES
Physical Therapy    Physical Therapy Treatment    Patient Name: Kaitlin Robles  MRN: [id number]  Department: [address]  [facility]  Today's Date: 7/26/2025  Time Calculation  Start Time: 0930  Stop Time: 0953  Time Calculation (min): 23 min         Assessment/Plan   PT Assessment  PT Assessment Results: Decreased strength, Impaired balance, Decreased mobility, Decreased endurance  Rehab Prognosis: Good  Barriers to Discharge Home: Caregiver assistance, Physical needs  Caregiver Assistance: Caregiver assistance needed per identified barriers - however, level of patient's required assistance exceeds assistance available at home  Physical Needs: Ambulating household distances limited by function/safety, High falls risk due to function or environment, 24hr mobility assistance needed  End of Session Communication: Bedside nurse  Assessment Comment: Pt demo good effort with tx today pt several attempts when standing up. Moderate assistance when standing unable to get feet under assisted with shifting weight forward. Pt lack BUE strength unable to scoot forward in chair needs assistance. Moderate cueing with ambulation for foot placement.  End of Session Patient Position: Up in chair (bedside commode with call light. Rn notified)  PT Plan  Inpatient/Swing Bed or Outpatient: Inpatient  PT Plan  Treatment/Interventions: Bed mobility, Transfer training, Gait training, Balance training, Strengthening, Endurance training, Therapeutic exercise, Therapeutic activity, Positioning  PT Plan: Ongoing PT  PT Frequency: 3 times per week (per this acute hospital stay)  PT Discharge Recommendations: Moderate intensity level of continued care (Based on current functional status and rehab potential, patient is anticipated to tolerate and benefit from 5 or more days per week of skilled rehabilitative therapy after discharge from this acute inpatient hospitalization.)  Equipment Recommended upon Discharge: Wheeled walker, Straight cane  (owns)  PT Recommended Transfer Status: Assist x2 (w/FWW and gait belt)  PT - OK to Discharge: Yes    PT Visit Info:  PT Received On: 07/26/25     General Visit Information:   General  Reason for Referral: Pt is 80 yr female with generalized weakness, constipation, Parkinson's disease and unspecific whether dyskinesia present resulting in impaired safety awareness as well as impaired mobility and gait.  Referred By: Faby Babcock DO  Past Medical History Relevant to Rehab: parkinson's, HTN, IBS, OA, Afib not on anticoagulation  Family/Caregiver Present: No  Prior to Session Communication: Bedside nurse  Patient Position Received: Alarm on, Up in chair  General Comment: Upon arrival up in chair, pleasant and agreeable to work with PT.    Subjective   Precautions:  Precautions  Medical Precautions: Fall precautions  Precautions Comment: external urinary catheter, telemetry            Objective   Pain:  Pain Assessment  Pain Assessment: 0-10  0-10 (Numeric) Pain Score: 0 - No pain  Cognition:  Cognition  Overall Cognitive Status: Within Functional Limits  Coordination:  Movements are Fluid and Coordinated: Yes  Postural Control:  Postural Control  Postural Control: Impaired  Dynamic Sitting Balance  Dynamic Sitting-Balance Support: Right upper extremity supported, Left upper extremity supported  Dynamic Sitting-Level of Assistance: Close supervision  Dynamic Sitting-Balance: Forward lean (seated marching)  Dynamic Sitting-Comments: No assistance needed  Static Standing Balance  Static Standing-Level of Assistance:  (x2)  Dynamic Standing Balance  Dynamic Standing-Balance Support: Bilateral upper extremity supported  Dynamic Standing-Level of Assistance: Minimum assistance (x2)  Dynamic Standing-Balance: Turning  Activity Tolerance:  Activity Tolerance  Endurance: Tolerates 10 - 20 min exercise with multiple rests  Treatments:  Therapeutic Exercise  Therapeutic Exercise Performed: Yes  Therapeutic Exercise Activity  1: Performed seated exercises BLE knee extension, marching, hip abd/add and AP x10                             Bed Mobility  Bed Mobility: Yes  Bed Mobility 1  Bed Mobility 1: Supine to sitting  Level of Assistance 1: Maximum assistance  Bed Mobility 2  Bed Mobility  2: Scooting  Level of Assistance 2: Moderate verbal cues (assisted hips to edge of chair.)    Ambulation/Gait Training  Ambulation/Gait Training Performed: Yes  Ambulation/Gait Training 1  Surface 1: Level tile  Device 1: Rolling walker  Assistance 1: Moderate assistance  Quality of Gait 1: Narrow base of support, Shuffling gait, Forward flexed posture  Comments/Distance (ft) 1: 6' cueing needed for hand and foot placement tactile cueing needed to turn with walker.  Transfers  Transfer: Yes  Transfer 1  Transfer From 1: Sit to  Transfer to 1: Stand  Technique 1: Sit to stand, Stand to sit  Transfer Device 1: Walker  Transfer Level of Assistance 1: Moderate assistance, Moderate tactile cues  Transfers 2  Transfer From 2: Stand to  Transfer to 2: Sit  Technique 2: Stand pivot (stand step pivot)  Transfer Device 2: Walker  Transfer Level of Assistance 2: Minimum assistance, Moderate verbal cues  Trials/Comments 2: to bedside commode                   Outcome Measures:  [facility] Basic Mobility  Turning from your back to your side while in a flat bed without using bedrails: A lot  Moving from lying on your back to sitting on the side of a flat bed without using bedrails: A lot  Moving to and from bed to chair (including a wheelchair): Total  Standing up from a chair using your arms (e.g. wheelchair or bedside chair): Total  To walk in hospital room: Total  Climbing 3-5 steps with railing: Total  Basic Mobility - Total Score: 8    Education Documentation  Body Mechanics, taught by Celine Calzada PTA at 7/26/2025 10:27 AM.  Learner: Patient  Readiness: Eager  Method: Explanation  Response: Verbalizes Understanding    Mobility Training, taught by Celine Calzada  PTA at 7/26/2025 10:27 AM.  Learner: Patient  Readiness: Eager  Method: Explanation  Response: Verbalizes Understanding    Education Comments  No comments found.        OP EDUCATION:       Encounter Problems       Encounter Problems (Active)       Balance       Pt will tolerate 5 minutes of dynamic seated balance during dual task activities.  (Progressing)       Start:  07/24/25    Expected End:  08/07/25               Balance       Pt will increase static and dynamic standing balance from 2 minutes to 5 minutes enabling her to complete functional transfers.  (Progressing)       Start:  07/24/25    Expected End:  08/07/25               Mobility       Pt will walk 25' with FWW and minAx1 to/from bathroom. (Progressing)       Start:  07/24/25    Expected End:  08/07/25               PT Transfers       Pt will complete sit<>stand transfers with FWW and minAx1 enabling her to complete functional tasks.  (Progressing)       Start:  07/24/25    Expected End:  08/07/25               Safety       Pt will verbalize and demo understanding of safety awareness with FWW during transfers and when walking. (Progressing)       Start:  07/24/25    Expected End:  08/07/25

## 2025-07-26 NOTE — HOSPITAL COURSE
Kaitlin Robles is a 80 y.o. female with PMH of parkinson's, HTN, IBS, OA, Afib not on anticoagulation who presented to the ED on 7/23 for evaluation of generalized weakness. She was unable to get out of bed and had some constipation. She was admitted and PT/OT was consulted. Patient started on miralax and lauri-colace and had a bowel movement overnight on 7/24. PT/OT recommended moderate intensity therapy.

## 2025-07-27 VITALS
SYSTOLIC BLOOD PRESSURE: 115 MMHG | DIASTOLIC BLOOD PRESSURE: 61 MMHG | OXYGEN SATURATION: 94 % | WEIGHT: 104.5 LBS | RESPIRATION RATE: 18 BRPM | BODY MASS INDEX: 17.84 KG/M2 | HEIGHT: 64 IN | HEART RATE: 90 BPM | TEMPERATURE: 96.3 F

## 2025-07-27 LAB
ALBUMIN SERPL BCP-MCNC: 3.5 G/DL (ref 3.4–5)
ANION GAP SERPL CALC-SCNC: 11 MMOL/L (ref 10–20)
BUN SERPL-MCNC: 16 MG/DL (ref 6–23)
CALCIUM SERPL-MCNC: 9.1 MG/DL (ref 8.6–10.3)
CHLORIDE SERPL-SCNC: 105 MMOL/L (ref 98–107)
CO2 SERPL-SCNC: 27 MMOL/L (ref 21–32)
CREAT SERPL-MCNC: 0.74 MG/DL (ref 0.5–1.05)
EGFRCR SERPLBLD CKD-EPI 2021: 83 ML/MIN/1.73M*2
ERYTHROCYTE [DISTWIDTH] IN BLOOD BY AUTOMATED COUNT: 12.6 % (ref 11.5–14.5)
GLUCOSE SERPL-MCNC: 86 MG/DL (ref 74–99)
HCT VFR BLD AUTO: 35.6 % (ref 36–46)
HGB BLD-MCNC: 11.5 G/DL (ref 12–16)
MAGNESIUM SERPL-MCNC: 2.17 MG/DL (ref 1.6–2.4)
MCH RBC QN AUTO: 31.1 PG (ref 26–34)
MCHC RBC AUTO-ENTMCNC: 32.3 G/DL (ref 32–36)
MCV RBC AUTO: 96 FL (ref 80–100)
NRBC BLD-RTO: 0 /100 WBCS (ref 0–0)
PHOSPHATE SERPL-MCNC: 4.7 MG/DL (ref 2.5–4.9)
PLATELET # BLD AUTO: 202 X10*3/UL (ref 150–450)
POTASSIUM SERPL-SCNC: 3.9 MMOL/L (ref 3.5–5.3)
RBC # BLD AUTO: 3.7 X10*6/UL (ref 4–5.2)
SODIUM SERPL-SCNC: 139 MMOL/L (ref 136–145)
WBC # BLD AUTO: 4 X10*3/UL (ref 4.4–11.3)

## 2025-07-27 PROCEDURE — 83735 ASSAY OF MAGNESIUM: CPT

## 2025-07-27 PROCEDURE — 2500000001 HC RX 250 WO HCPCS SELF ADMINISTERED DRUGS (ALT 637 FOR MEDICARE OP)

## 2025-07-27 PROCEDURE — 2500000002 HC RX 250 W HCPCS SELF ADMINISTERED DRUGS (ALT 637 FOR MEDICARE OP, ALT 636 FOR OP/ED)

## 2025-07-27 PROCEDURE — 85027 COMPLETE CBC AUTOMATED: CPT

## 2025-07-27 PROCEDURE — G0378 HOSPITAL OBSERVATION PER HR: HCPCS

## 2025-07-27 PROCEDURE — 94760 N-INVAS EAR/PLS OXIMETRY 1: CPT

## 2025-07-27 PROCEDURE — 99232 SBSQ HOSP IP/OBS MODERATE 35: CPT

## 2025-07-27 PROCEDURE — 2500000004 HC RX 250 GENERAL PHARMACY W/ HCPCS (ALT 636 FOR OP/ED)

## 2025-07-27 PROCEDURE — 36415 COLL VENOUS BLD VENIPUNCTURE: CPT

## 2025-07-27 PROCEDURE — 80069 RENAL FUNCTION PANEL: CPT

## 2025-07-27 RX ADMIN — POLYETHYLENE GLYCOL 3350 17 G: 17 POWDER, FOR SOLUTION ORAL at 08:01

## 2025-07-27 RX ADMIN — AMLODIPINE BESYLATE 5 MG: 5 TABLET ORAL at 08:00

## 2025-07-27 RX ADMIN — KETOTIFEN FUMARATE 1 DROP: 0.25 SOLUTION/ DROPS OPHTHALMIC at 20:10

## 2025-07-27 RX ADMIN — KETOTIFEN FUMARATE 1 DROP: 0.25 SOLUTION/ DROPS OPHTHALMIC at 08:00

## 2025-07-27 RX ADMIN — RASAGILINE 0.5 MG: 0.5 TABLET ORAL at 08:00

## 2025-07-27 ASSESSMENT — COGNITIVE AND FUNCTIONAL STATUS - GENERAL
EATING MEALS: A LITTLE
DRESSING REGULAR UPPER BODY CLOTHING: A LITTLE
STANDING UP FROM CHAIR USING ARMS: A LITTLE
WALKING IN HOSPITAL ROOM: A LOT
STANDING UP FROM CHAIR USING ARMS: A LITTLE
MOVING TO AND FROM BED TO CHAIR: A LITTLE
HELP NEEDED FOR BATHING: A LITTLE
HELP NEEDED FOR BATHING: A LITTLE
DAILY ACTIVITIY SCORE: 18
PERSONAL GROOMING: A LITTLE
DRESSING REGULAR LOWER BODY CLOTHING: A LITTLE
TOILETING: A LITTLE
EATING MEALS: A LITTLE
TURNING FROM BACK TO SIDE WHILE IN FLAT BAD: A LITTLE
MOVING TO AND FROM BED TO CHAIR: A LITTLE
DAILY ACTIVITIY SCORE: 18
MOVING FROM LYING ON BACK TO SITTING ON SIDE OF FLAT BED WITH BEDRAILS: A LITTLE
CLIMB 3 TO 5 STEPS WITH RAILING: A LOT
MOBILITY SCORE: 16
WALKING IN HOSPITAL ROOM: A LOT
DRESSING REGULAR UPPER BODY CLOTHING: A LITTLE
TOILETING: A LITTLE
TURNING FROM BACK TO SIDE WHILE IN FLAT BAD: A LITTLE
CLIMB 3 TO 5 STEPS WITH RAILING: A LOT
MOVING FROM LYING ON BACK TO SITTING ON SIDE OF FLAT BED WITH BEDRAILS: A LITTLE
PERSONAL GROOMING: A LITTLE
DRESSING REGULAR LOWER BODY CLOTHING: A LITTLE
MOBILITY SCORE: 16

## 2025-07-27 ASSESSMENT — PAIN SCALES - GENERAL
PAINLEVEL_OUTOF10: 0 - NO PAIN
PAINLEVEL_OUTOF10: 0 - NO PAIN

## 2025-07-27 ASSESSMENT — PAIN - FUNCTIONAL ASSESSMENT
PAIN_FUNCTIONAL_ASSESSMENT: 0-10
PAIN_FUNCTIONAL_ASSESSMENT: 0-10

## 2025-07-27 NOTE — PROGRESS NOTES
Department of Hospital Medicine  Daily Progress Note   Hospital Day: 5       Name:Kaitlin Robles, AGE: 80 y.o., GENDER: female, MRN: [id number], ROOM: 220/220-B   CODE STATUS: DNR and No Intubation  Attending Physician: Glenn Gonzalez DO  Resident: Kevin Mackay MD        Chief Complaint     Chief Complaint   Patient presents with    Weakness, Gen     States she was unable to get out of bed today due to weakness. Denies pain anywhere. States she is a DNR- CC        Interval History   Kaitlin Robles is a 80 y.o. year old female patient on Hospital Day: 5    Overnight events:  No acute events overnight  Seen and examined at bedside this morning.     Subjective    Patient states that she feels well. No complaints at this time.    Meds    Scheduled medications  Scheduled Medications[1]  Continuous medications  Continuous Medications[2]  PRN medications  PRN Medications[3]     Objective    Exam   Physical Exam  Constitutional:       General: She is not in acute distress.     Appearance: Normal appearance. She is not ill-appearing, toxic-appearing or diaphoretic.      Comments: Frail appearing     HENT:      Mouth/Throat:      Comments: Lesion on lower lip vermilion border    Cardiovascular:      Rate and Rhythm: Normal rate and regular rhythm.      Pulses: Normal pulses.      Heart sounds: Normal heart sounds. No murmur heard.     No friction rub. No gallop.   Pulmonary:      Effort: Pulmonary effort is normal. No respiratory distress.      Breath sounds: Normal breath sounds. No stridor. No wheezing, rhonchi or rales.   Chest:      Chest wall: No tenderness.   Abdominal:      General: Abdomen is flat. There is no distension.      Palpations: Abdomen is soft. There is no mass.      Tenderness: There is no abdominal tenderness. There is no guarding or rebound.      Hernia: No hernia is present.     Musculoskeletal:         General: No swelling, tenderness, deformity or signs of injury.      Right lower leg: No edema.       Left lower leg: No edema.      Comments: Cogwheel rigidity     Skin:     General: Skin is warm and dry.      Coloration: Skin is not jaundiced or pale.      Findings: No bruising, erythema, lesion or rash.     Neurological:      General: No focal deficit present.      Mental Status: She is alert and oriented to person, place, and time.     Psychiatric:         Mood and Affect: Mood normal.         Behavior: Behavior normal.          Vitals         7/26/2025     6:30 AM 7/26/2025     7:32 AM 7/26/2025     3:50 PM 7/26/2025     7:00 PM 7/27/2025     3:45 AM 7/27/2025     7:54 AM 7/27/2025     8:00 AM   Vitals   Systolic  148 120 115 134 137 137   Diastolic  75 64 61 76 78 78   BP Location  Left arm Left arm Left arm Left arm Left arm    Heart Rate 82 71 87 99 87 73 82   Temp  36.4 °C (97.5 °F) 36.5 °C (97.7 °F) 36.6 °C (97.9 °F) 36 °C (96.8 °F)     Resp  18 16 16 16 18         Intake/Output Summary (Last 24 hours) at 7/27/2025 1357  Last data filed at 7/27/2025 1051  Gross per 24 hour   Intake 720 ml   Output --   Net 720 ml       Labs   Labs:   Results from last 72 hours   Lab Units 07/27/25  0530 07/26/25  0555 07/25/25  0654   SODIUM mmol/L 139 138 140   POTASSIUM mmol/L 3.9 3.9 4.1   CHLORIDE mmol/L 105 105 106   CO2 mmol/L 27 27 28   BUN mg/dL 16 20 22   CREATININE mg/dL 0.74 0.79 0.82   GLUCOSE mg/dL 86 87 89   CALCIUM mg/dL 9.1 9.0 9.0   ANION GAP mmol/L 11 10 10   EGFR mL/min/1.73m*2 83 77 73   PHOSPHORUS mg/dL 4.7 4.0 3.7      Results from last 72 hours   Lab Units 07/27/25  0530 07/26/25  0555 07/25/25  0654   WBC AUTO x10*3/uL 4.0* 4.5 4.3*   HEMOGLOBIN g/dL 11.5* 11.7* 11.7*   HEMATOCRIT % 35.6* 36.4 36.1   PLATELETS AUTO x10*3/uL 202 204 193      Lab Results   Component Value Date    CALCIUM 9.1 07/27/2025    PHOS 4.7 07/27/2025        Micro/ID:   No results found for the last 90 days.        Images    Imaging  US gallbladder  Result Date: 7/24/2025  No evidence of cholelithiasis, cholecystitis, or  biliary dilatation.   Signed by: Jim Calvert 7/24/2025 12:53 AM Dictation workstation:   [id number]    CT abdomen pelvis wo IV contrast  Result Date: 7/23/2025  1.Moderate stool burden within the colon. Diverticular disease without discrete evidence of diverticulitis. 2.Question mild gallbladder wall thickening.  Consider ultrasound for further evaluation if clinically indicated. 3.Additional findings as above. Signed by Ramu Ceja MD    CT head wo IV contrast  Result Date: 7/23/2025  No evidence of acute intracranial hemorrhage or mass effect.       MACRO: None   Signed by: Hesham [place] 7/23/2025 6:46 PM Dictation workstation:   [id number]    XR chest 2 views  Result Date: 7/23/2025  No evidence of acute intrathoracic abnormality.   Signed by: Saul Graf 7/23/2025 6:10 PM Dictation workstation:   [id number]      Cardiology, Vascular, and Other Imaging  ECG 12 lead  Result Date: 7/24/2025  Sinus tachycardia Possible Left atrial enlargement Left axis deviation Anterior infarct (cited on or before 18-MAR-2024) Abnormal ECG When compared with ECG of 18-MAR-2024 18:06, QRS axis Shifted left        Assessment    Assessment and Plan    Kaitlin Robles is a 80 y.o. female admitted on 7/23/2025     Updates 07/27/25  :  Pending precert for SNF    ACUTE MEDICAL ISSUES:  #Generalized weakness  - Ongoing problem, worsened when she was unable to get out of bed. Lives independently with aide assist 2 x weekly  - VSS, no metabolic derangements, UA negative  PLAN:  - PT/OT consulted, recommending moderate intensity therapy       ADDRESSED/ RESOLVED MEDICAL ISSUES:  #Constipation  - CT showed moderate stool burden. Last BM reportedly 1 week ago  - S/p lactulose in ED  - Patient reported BM x 3   PLAN:  - Continue miralax and lauri-colace PRN  - Continue to monitor for BM      CHRONIC MEDICAL ISSUES:  # HTN: Continue amlodipine  # Afib: not on AC as eliquis was giving her GI issues  # Parkinson's: continue home  rasagiline      Fluids: PO intake & IVF PRN  Electrolytes: Replete PRN  Nutrition: Regular diet  Antimicrobials: None  DVT ppx: Lovenox subq  GI ppx: None  Catheter: None  Lines: PIV  Supplemental Oxygen: None  Emergency Contact: Extended Emergency Contact Information  Primary Emergency Contact: Roel Wilson  Home Phone: [phone]  Relation: Relative  Secondary Emergency Contact: MINH ALVARES  Home Phone: [phone]  Relation: Other   Code: DNR and No Intubation     Disposition: 80 y.o. female admitted for generalized weakness. Discharge pending PT/OT evaluation and placement.     Kevin Mackay MD   Internal Medicine, PGY-III  07/27/25 at 1:57 PM                [1] amLODIPine, 5 mg, oral, Daily  enoxaparin, 40 mg, subcutaneous, Daily  ketotifen, 1 drop, Right Eye, BID  polyethylene glycol, 17 g, oral, Daily  rasagiline, 0.5 mg, oral, Daily  sennosides-docusate sodium, 1 tablet, oral, Nightly     [2]    [3] PRN medications: acetaminophen

## 2025-07-28 LAB
ALBUMIN SERPL BCP-MCNC: 3.5 G/DL (ref 3.4–5)
ANION GAP SERPL CALC-SCNC: 10 MMOL/L (ref 10–20)
BUN SERPL-MCNC: 19 MG/DL (ref 6–23)
CALCIUM SERPL-MCNC: 9.1 MG/DL (ref 8.6–10.3)
CHLORIDE SERPL-SCNC: 104 MMOL/L (ref 98–107)
CO2 SERPL-SCNC: 29 MMOL/L (ref 21–32)
CREAT SERPL-MCNC: 0.81 MG/DL (ref 0.5–1.05)
EGFRCR SERPLBLD CKD-EPI 2021: 74 ML/MIN/1.73M*2
ERYTHROCYTE [DISTWIDTH] IN BLOOD BY AUTOMATED COUNT: 12.6 % (ref 11.5–14.5)
GLUCOSE SERPL-MCNC: 81 MG/DL (ref 74–99)
HCT VFR BLD AUTO: 34.9 % (ref 36–46)
HGB BLD-MCNC: 11.4 G/DL (ref 12–16)
MAGNESIUM SERPL-MCNC: 2.2 MG/DL (ref 1.6–2.4)
MCH RBC QN AUTO: 31.5 PG (ref 26–34)
MCHC RBC AUTO-ENTMCNC: 32.7 G/DL (ref 32–36)
MCV RBC AUTO: 96 FL (ref 80–100)
NRBC BLD-RTO: 0 /100 WBCS (ref 0–0)
PHOSPHATE SERPL-MCNC: 4.4 MG/DL (ref 2.5–4.9)
PLATELET # BLD AUTO: 192 X10*3/UL (ref 150–450)
POTASSIUM SERPL-SCNC: 4.1 MMOL/L (ref 3.5–5.3)
RBC # BLD AUTO: 3.62 X10*6/UL (ref 4–5.2)
SODIUM SERPL-SCNC: 139 MMOL/L (ref 136–145)
WBC # BLD AUTO: 4 X10*3/UL (ref 4.4–11.3)

## 2025-07-28 PROCEDURE — 97535 SELF CARE MNGMENT TRAINING: CPT | Mod: GO,CO

## 2025-07-28 PROCEDURE — 97116 GAIT TRAINING THERAPY: CPT | Mod: GP,CQ

## 2025-07-28 PROCEDURE — 97530 THERAPEUTIC ACTIVITIES: CPT | Mod: GP,CQ

## 2025-07-28 PROCEDURE — 97530 THERAPEUTIC ACTIVITIES: CPT | Mod: GO,CO

## 2025-07-28 PROCEDURE — 2500000001 HC RX 250 WO HCPCS SELF ADMINISTERED DRUGS (ALT 637 FOR MEDICARE OP)

## 2025-07-28 PROCEDURE — G0378 HOSPITAL OBSERVATION PER HR: HCPCS

## 2025-07-28 PROCEDURE — 99239 HOSP IP/OBS DSCHRG MGMT >30: CPT

## 2025-07-28 PROCEDURE — 97110 THERAPEUTIC EXERCISES: CPT | Mod: GP,CQ

## 2025-07-28 PROCEDURE — 94760 N-INVAS EAR/PLS OXIMETRY 1: CPT

## 2025-07-28 PROCEDURE — 85027 COMPLETE CBC AUTOMATED: CPT

## 2025-07-28 PROCEDURE — 36415 COLL VENOUS BLD VENIPUNCTURE: CPT

## 2025-07-28 PROCEDURE — 80069 RENAL FUNCTION PANEL: CPT

## 2025-07-28 PROCEDURE — 96372 THER/PROPH/DIAG INJ SC/IM: CPT

## 2025-07-28 PROCEDURE — 83735 ASSAY OF MAGNESIUM: CPT

## 2025-07-28 PROCEDURE — 2500000004 HC RX 250 GENERAL PHARMACY W/ HCPCS (ALT 636 FOR OP/ED)

## 2025-07-28 PROCEDURE — 2500000002 HC RX 250 W HCPCS SELF ADMINISTERED DRUGS (ALT 637 FOR MEDICARE OP, ALT 636 FOR OP/ED)

## 2025-07-28 RX ADMIN — POLYETHYLENE GLYCOL 3350 17 G: 17 POWDER, FOR SOLUTION ORAL at 08:34

## 2025-07-28 RX ADMIN — RASAGILINE 0.5 MG: 0.5 TABLET ORAL at 08:35

## 2025-07-28 RX ADMIN — ACETAMINOPHEN 650 MG: 325 TABLET ORAL at 09:13

## 2025-07-28 RX ADMIN — AMLODIPINE BESYLATE 5 MG: 5 TABLET ORAL at 08:34

## 2025-07-28 RX ADMIN — ENOXAPARIN SODIUM 40 MG: 100 INJECTION SUBCUTANEOUS at 08:34

## 2025-07-28 RX ADMIN — KETOTIFEN FUMARATE 1 DROP: 0.25 SOLUTION/ DROPS OPHTHALMIC at 20:01

## 2025-07-28 RX ADMIN — KETOTIFEN FUMARATE 1 DROP: 0.25 SOLUTION/ DROPS OPHTHALMIC at 08:35

## 2025-07-28 ASSESSMENT — COGNITIVE AND FUNCTIONAL STATUS - GENERAL
EATING MEALS: A LITTLE
HELP NEEDED FOR BATHING: A LITTLE
MOVING TO AND FROM BED TO CHAIR: A LITTLE
DAILY ACTIVITIY SCORE: 15
TOILETING: A LOT
MOVING FROM LYING ON BACK TO SITTING ON SIDE OF FLAT BED WITH BEDRAILS: A LITTLE
MOVING TO AND FROM BED TO CHAIR: A LOT
DRESSING REGULAR UPPER BODY CLOTHING: A LITTLE
WALKING IN HOSPITAL ROOM: A LOT
MOBILITY SCORE: 16
PERSONAL GROOMING: A LITTLE
DRESSING REGULAR LOWER BODY CLOTHING: A LITTLE
MOVING FROM LYING ON BACK TO SITTING ON SIDE OF FLAT BED WITH BEDRAILS: A LOT
DAILY ACTIVITIY SCORE: 18
TURNING FROM BACK TO SIDE WHILE IN FLAT BAD: A LOT
CLIMB 3 TO 5 STEPS WITH RAILING: TOTAL
TOILETING: A LITTLE
EATING MEALS: A LITTLE
MOBILITY SCORE: 10
TURNING FROM BACK TO SIDE WHILE IN FLAT BAD: A LITTLE
WALKING IN HOSPITAL ROOM: TOTAL
DRESSING REGULAR LOWER BODY CLOTHING: A LOT
PERSONAL GROOMING: A LITTLE
CLIMB 3 TO 5 STEPS WITH RAILING: A LOT
STANDING UP FROM CHAIR USING ARMS: A LITTLE
DRESSING REGULAR UPPER BODY CLOTHING: A LITTLE
HELP NEEDED FOR BATHING: A LOT
STANDING UP FROM CHAIR USING ARMS: A LOT

## 2025-07-28 ASSESSMENT — PAIN - FUNCTIONAL ASSESSMENT
PAIN_FUNCTIONAL_ASSESSMENT: 0-10

## 2025-07-28 ASSESSMENT — ACTIVITIES OF DAILY LIVING (ADL): HOME_MANAGEMENT_TIME_ENTRY: 13

## 2025-07-28 ASSESSMENT — PAIN SCALES - GENERAL
PAINLEVEL_OUTOF10: 3
PAINLEVEL_OUTOF10: 0 - NO PAIN
PAINLEVEL_OUTOF10: 0 - NO PAIN
PAINLEVEL_OUTOF10: 5 - MODERATE PAIN

## 2025-07-28 NOTE — PROGRESS NOTES
07/28/25 1035   Discharge Planning   Living Arrangements Alone   Support Systems Family members;Friends/neighbors;Home care staff  ((Patient has a private duty HHA on Mondays/2 hours and Tuesdays/3 hours.))   Assistance Needed Alert and oriented x 4, Independent as much as possible, Doesn't drive(family and HHA transports), Walker, Wheel chair, Grab bars, Bulit in shower chair, Raised toilet, Room air at baseline and currently, No CPAP/Bipap, PCP-Dr. Tenzin Waldrop, DO   Type of Residence Private residence  ((Mobile Home in a mobile home park))   Number of Stairs to Enter Residence 0  (Ramp into home)   Number of Stairs Within Residence 0   Do you have animals or pets at home? No   Who is requesting discharge planning? Provider   Home or Post Acute Services Post acute facilities (Rehab/SNF/etc)   Type of Post Acute Facility Services Skilled nursing   Expected Discharge Disposition SNF  (Plan is for the patient to discharge to [facility], precert obtained, awaiting clearance from medical team. ADOD 7/28/25)   Does the patient need discharge transport arranged? Yes   [facility] coordination needed? Yes   Has discharge transport been arranged? No   Patient Choice   Provider Choice list and CMS website (https://medicare.gov/care-compare#search) for post-acute Quality and Resource Measure Data were provided and reviewed with: Patient;Family   Patient / Family choosing to utilize agency / facility established prior to hospitalization No   Stroke Family Assessment   Stroke Family Assessment Needed No   Intensity of Service   Intensity of Service 0-30 min

## 2025-07-28 NOTE — PROGRESS NOTES
Physical Therapy    Physical Therapy Treatment    Patient Name: Kaitlin Robles  MRN: [id number]  Department: [address]  [facility]  Today's Date: 7/28/2025  Time Calculation  Start Time: 1136  Stop Time: 1214  Time Calculation (min): 38 min         Assessment/Plan   PT Assessment  Rehab Prognosis: Good  Physical Needs: Ambulating household distances limited by function/safety, High falls risk due to function or environment, 24hr mobility assistance needed  Evaluation/Treatment Tolerance: Patient tolerated treatment well  Medical Staff Made Aware: Yes  Barriers to Participation: Comorbidities  End of Session Communication: Bedside nurse  End of Session Patient Position: Bed, 3 rail up, Alarm on  PT Plan  Inpatient/Swing Bed or Outpatient: Inpatient  PT Plan  Treatment/Interventions: Bed mobility, Transfer training, Gait training, Balance training, Strengthening, Endurance training, Therapeutic exercise, Therapeutic activity, Positioning  PT Plan: Ongoing PT  PT Frequency: 3 times per week (per this acute hospital stay)  PT Discharge Recommendations: Moderate intensity level of continued care (Based on current functional status and rehab potential, patient is anticipated to tolerate and benefit from 5 or more days per week of skilled rehabilitative therapy after discharge from this acute inpatient hospitalization.)  Equipment Recommended upon Discharge: Wheeled walker  PT Recommended Transfer Status: Assist x1  PT - OK to Discharge: Yes    PT Visit Info:  PT Received On: 07/28/25  Response to Previous Treatment: Patient with no complaints from previous session.     General Visit Information:   General  Reason for Referral: Patient is 80 yr female with generalized weakness, constipation, Parkinson's disease and unspecific whether dyskinesia present resulting in impaired safety awareness as well as impaired mobility and gait. PT referred for impaired  mobility.  Referred By: Faby Babcock DO  Past Medical  "History Relevant to Rehab: parkinson's, HTN, IBS, OA, Afib not on anticoagulation  Family/Caregiver Present: No  Prior to Session Communication: Bedside nurse  Patient Position Received: Up in chair, Alarm on  Preferred Learning Style: verbal  General Comment: Patient pleasant and cooperative. Patient demonstrates with decreased insight into safety with transfers and mobility. Patient reaching over walker for bedrail and side of bed. Patient reaching over walker for BSC. Consistent reminders on how to use walker for safety.    Subjective   Precautions:  Precautions  Medical Precautions: Fall precautions            Objective   Pain:  Pain Assessment  Pain Assessment: 0-10  0-10 (Numeric) Pain Score: 0 - No pain  Cognition:  Cognition  Overall Cognitive Status: Within Functional Limits  Arousal/Alertness: Appropriate responses to stimuli  Orientation Level: Oriented X4  Insight: Mild  Coordination:  Movements are Fluid and Coordinated: Yes  Coordination Comment: Patient presents with difficulty taking steps. (Patient \"gets stuck\" and toe walks intermittently.)  Postural Control:  Postural Control  Postural Control: Impaired  Trunk Control: VC to maintain upright posture and trunk control in standing  Static Sitting Balance  Static Sitting-Balance Support: Feet supported  Static Sitting-Level of Assistance: Contact guard  Static Sitting-Comment/Number of Minutes: Patient retropulsive. CGA to correct.  Dynamic Sitting Balance  Dynamic Sitting-Balance Support: Bilateral upper extremity supported  Dynamic Sitting-Level of Assistance: Close supervision  Static Standing Balance  Static Standing-Balance Support: Bilateral upper extremity supported  Static Standing-Level of Assistance: Contact guard, Minimum assistance  Static Standing-Comment/Number of Minutes: Patient retropulsive and stand with NBOS. VC to correct.  Extremity/Trunk Assessments:    Activity Tolerance:  Activity Tolerance  Endurance: Tolerates 30 min " "exercise with multiple rests  Early Mobility/Exercise Safety Screen: Proceed with mobilization - No exclusion criteria met  Treatments:  Therapeutic Exercise  Therapeutic Exercise Performed: Yes  Therapeutic Exercise Activity 1: Seated ther ex x 10 each  B LE (HR/TR, HS, ABD/ADD, Hip flex, LAQ (vc to stay on task))                             Bed Mobility  Bed Mobility: Yes  Bed Mobility 1  Bed Mobility 1: Sitting to supine  Level of Assistance 1: Minimum assistance    Ambulation/Gait Training  Ambulation/Gait Training Performed: Yes  Ambulation/Gait Training 1  Surface 1: Level tile  Device 1: Rolling walker  Assistance 1: Minimum assistance, Moderate assistance  Quality of Gait 1: Narrow base of support, Inconsistent stride length, Decreased step length, Shuffling gait, Festinating (Patient intermittnetly toe walked in which patient was aware)  Comments/Distance (ft) 1: several small steps from chair > BSC.  Several small steps from BSC > EOB (Extended time to complete. VC/ TC and assistance for walker management.)  Transfers  Transfer: Yes  Transfer 1  Transfer From 1: Commode-standard to, Chair with arms to  Transfer to 1: Sit, Stand  Technique 1: Sit to stand, Stand to sit  Transfer Device 1: Walker  Transfer Level of Assistance 1: Moderate assistance  Trials/Comments 1: vc/tc for hand placment, sequencing and technique.  Transfers 2  Transfer From 2: Stand to, Sit to  Transfer to 2: Bed  Technique 2: Stand to sit  Transfer Device 2: Walker  Transfer Level of Assistance 2: Minimum assistance  Trials/Comments 2: ModA to assist with walker management for directional turns. (Mod VC for directional changes, hand placement, where to place feet and widen CODY. Patient states \"I have my own way of getting to the bed\" as patient reached over walker to attempt to grab bedrail.)                          Outcome Measures:       Education Documentation  Precautions, taught by Chichi De León PTA at 7/28/2025  2:26 " PM.  Learner: Patient  Readiness: Eager  Method: Explanation, Demonstration  Response: Verbalizes Understanding, Needs Reinforcement    Home Exercise Program, taught by Chichi De León PTA at 7/28/2025  2:26 PM.  Learner: Patient  Readiness: Eager  Method: Explanation, Demonstration  Response: Verbalizes Understanding, Needs Reinforcement    Body Mechanics, taught by Chichi De León PTA at 7/28/2025  2:26 PM.  Learner: Patient  Readiness: Eager  Method: Explanation, Demonstration  Response: Verbalizes Understanding, Needs Reinforcement    Mobility Training, taught by Chichi De León PTA at 7/28/2025  2:26 PM.  Learner: Patient  Readiness: Eager  Method: Explanation, Demonstration  Response: Verbalizes Understanding, Needs Reinforcement    Education Comments  No comments found.    Outcome Measures:  [facility] Basic Mobility  Turning from your back to your side while in a flat bed without using bedrails: A lot  Moving from lying on your back to sitting on the side of a flat bed without using bedrails: Alot  Moving to and from bed to chair (including a wheelchair): Alot  Standing up from a chair using your arms (e.g. wheelchair or bedside chair): Alot  To walk in hospital room: Total  Climbing 3-5 steps with railing: Total  Basic Mobility - Total Score: 10        OP EDUCATION:       Encounter Problems       Encounter Problems (Active)       Balance       Pt will tolerate 5 minutes of dynamic seated balance during dual task activities.  (Progressing)       Start:  07/24/25    Expected End:  08/07/25               Balance       Pt will increase static and dynamic standing balance from 2 minutes to 5 minutes enabling her to complete functional transfers.  (Progressing)       Start:  07/24/25    Expected End:  08/07/25               Mobility       Pt will walk 25' with FWW and minAx1 to/from bathroom. (Progressing)       Start:  07/24/25    Expected End:  08/07/25               PT Transfers       Pt will complete  sit<>stand transfers with FWW and minAx1 enabling her to complete functional tasks.  (Progressing)       Start:  07/24/25    Expected End:  08/07/25               Safety       Pt will verbalize and demo understanding of safety awareness with FWW during transfers and when walking. (Progressing)       Start:  07/24/25    Expected End:  08/07/25

## 2025-07-28 NOTE — PROGRESS NOTES
Department of Hospital Medicine  Daily Progress Note   Hospital Day: 6       Name:Kaitlin Robles, AGE: 80 y.o., GENDER: female, MRN: [id number], ROOM: 220/220-B   CODE STATUS: DNR and No Intubation  Attending Physician: Glenn Gonzalez DO  Resident: Damari Dejesus DO        Chief Complaint     Chief Complaint   Patient presents with    Weakness, Gen     States she was unable to get out of bed today due to weakness. Denies pain anywhere. States she is a DNR- CC        Interval History   Kaitlin Robles is a 80 y.o. year old female patient on Hospital Day: 6    Overnight events:  No acute events overnight  Seen and examined at bedside this morning.     Subjective    Patient states that she feels well. Wants to get up to work with PT. No complaints at this time.  Meds    Scheduled medications  Scheduled Medications[1]  Continuous medications  Continuous Medications[2]  PRN medications  PRN Medications[3]     Objective    Exam   Physical Exam  Constitutional:       General: She is not in acute distress.     Appearance: Normal appearance. She is not ill-appearing, toxic-appearing or diaphoretic.      Comments: Frail appearing     HENT:      Mouth/Throat:      Comments: Lesion on lower lip vermilion border    Cardiovascular:      Rate and Rhythm: Normal rate and regular rhythm.      Pulses: Normal pulses.      Heart sounds: Normal heart sounds. No murmur heard.     No friction rub. No gallop.   Pulmonary:      Effort: Pulmonary effort is normal. No respiratory distress.      Breath sounds: Normal breath sounds. No stridor. No wheezing, rhonchi or rales.   Chest:      Chest wall: No tenderness.   Abdominal:      General: Abdomen is flat. There is no distension.      Palpations: Abdomen is soft. There is no mass.      Tenderness: There is no abdominal tenderness. There is no guarding or rebound.      Hernia: No hernia is present.     Musculoskeletal:         General: No swelling, tenderness, deformity or signs of  injury.      Right lower leg: No edema.      Left lower leg: No edema.      Comments: Cogwheel rigidity     Skin:     General: Skin is warm and dry.      Coloration: Skin is not jaundiced or pale.      Findings: No bruising, erythema, lesion or rash.     Neurological:      General: No focal deficit present.      Mental Status: She is alert and oriented to person, place, and time.     Psychiatric:         Mood and Affect: Mood normal.         Behavior: Behavior normal.          Vitals         7/27/2025     4:50 PM 7/27/2025     4:51 PM 7/27/2025     4:52 PM 7/27/2025     4:53 PM 7/27/2025     7:35 PM 7/28/2025     3:50 AM 7/28/2025     7:25 AM   Vitals   Systolic  163 163  115 128 145   Diastolic  81 81  61 67 75   BP Location   Left arm  Left arm Left arm Left arm   Heart Rate 103 99 98 101 90 74 75   Temp     35.7 °C (96.3 °F) 36.6 °C (97.9 °F) 36.5 °C (97.7 °F)   Resp     18 16 17        Intake/Output Summary (Last 24 hours) at 7/28/2025 0826  Last data filed at 7/27/2025 2010  Gross per 24 hour   Intake 1320 ml   Output --   Net 1320 ml       Labs   Labs:   Results from last 72 hours   Lab Units 07/28/25  0542 07/27/25  0530 07/26/25  0555   SODIUM mmol/L 139 139 138   POTASSIUM mmol/L 4.1 3.9 3.9   CHLORIDE mmol/L 104 105 105   CO2 mmol/L 29 27 27   BUN mg/dL 19 16 20   CREATININE mg/dL 0.81 0.74 0.79   GLUCOSE mg/dL 81 86 87   CALCIUM mg/dL 9.1 9.1 9.0   ANION GAP mmol/L 10 11 10   EGFR mL/min/1.73m*2 74 83 77   PHOSPHORUS mg/dL 4.4 4.7 4.0      Results from last 72 hours   Lab Units 07/28/25  0542 07/27/25  0530 07/26/25  0555   WBC AUTO x10*3/uL 4.0* 4.0* 4.5   HEMOGLOBIN g/dL 11.4* 11.5* 11.7*   HEMATOCRIT % 34.9* 35.6* 36.4   PLATELETS AUTO x10*3/uL 192 202 204      Lab Results   Component Value Date    CALCIUM 9.1 07/28/2025    PHOS 4.4 07/28/2025        Micro/ID:   No results found for the last 90 days.        Images    Imaging  US gallbladder  Result Date: 7/24/2025  No evidence of cholelithiasis,  cholecystitis, or biliary dilatation.   Signed by: Jim Calvert 7/24/2025 12:53 AM Dictation workstation:   [id number]    CT abdomen pelvis wo IV contrast  Result Date: 7/23/2025  1.Moderate stool burden within the colon. Diverticular disease without discrete evidence of diverticulitis. 2.Question mild gallbladder wall thickening.  Consider ultrasound for further evaluation if clinically indicated. 3.Additional findings as above. Signed by Ramu Ceja MD    CT head wo IV contrast  Result Date: 7/23/2025  No evidence of acute intracranial hemorrhage or mass effect.       MACRO: None   Signed by: H[place]ham [place] 7/23/2025 6:46 PM Dictation workstation:   [id number]    XR chest 2 views  Result Date: 7/23/2025  No evidence of acute intrathoracic abnormality.   Signed by: Saul Graf 7/23/2025 6:10 PM Dictation workstation:   [id number]      Cardiology, Vascular, and Other Imaging  ECG 12 lead  Result Date: 7/24/2025  Sinus tachycardia Possible Left atrial enlargement Left axis deviation Anterior infarct (cited on or before 18-MAR-2024) Abnormal ECG When compared with ECG of 18-MAR-2024 18:06, QRS axis Shifted left        Assessment    Assessment and Plan    Kaitlin Robles is a 80 y.o. female admitted on 7/23/2025     Updates 07/28/25  :  Pending precert for SNF    ACUTE MEDICAL ISSUES:  #Generalized weakness  - Ongoing problem, worsened when she was unable to get out of bed. Lives independently with aide assist 2 x weekly  - VSS, no metabolic derangements, UA negative  PLAN:  - PT/OT consulted, recommending moderate intensity therapy  - Pending SNF precert       ADDRESSED/ RESOLVED MEDICAL ISSUES:  #Constipation  - CT showed moderate stool burden. Last BM reportedly 1 week ago  - S/p lactulose in ED  - Patient reported BM x 3   PLAN:  - Continue miralax and lauri-colace PRN  - Continue to monitor for BM      CHRONIC MEDICAL ISSUES:  # HTN: Continue amlodipine  # Afib: not on AC as eliquis was giving her GI  issues  # Parkinson's: continue home rasagiline      Fluids: PO intake & IVF PRN  Electrolytes: Replete PRN  Nutrition: Regular diet  Antimicrobials: None  DVT ppx: Lovenox subq  GI ppx: None  Catheter: None  Lines: PIV  Supplemental Oxygen: None  Emergency Contact: Extended Emergency Contact Information  Primary Emergency Contact: Roel Wilson  Home Phone: [phone]  Relation: Relative  Secondary Emergency Contact: MINH ALVARES  Home Phone: [phone]  Relation: Other   Code: DNR and No Intubation     Disposition: 80 y.o. female admitted for generalized weakness. Discharge pending placement.     Damari Dejesus, DO   Internal Medicine, PGY- 1  07/28/25 at 8:26 AM                          [1] amLODIPine, 5 mg, oral, Daily  enoxaparin, 40 mg, subcutaneous, Daily  ketotifen, 1 drop, Right Eye, BID  polyethylene glycol, 17 g, oral, Daily  rasagiline, 0.5 mg, oral, Daily  sennosides-docusate sodium, 1 tablet, oral, Nightly     [2]    [3] PRN medications: acetaminophen

## 2025-07-28 NOTE — PROGRESS NOTES
Occupational Therapy    Occupational Therapy Treatment    Name: Kaitlin Robles  MRN: [id number]  Department: [address]  Room: 220220-B  Date: 07/28/25  Time Calculation  Start Time: 0921  Stop Time: 0944  Time Calculation (min): 23 min    Assessment:  OT Assessment: Pt continues to present with decreased ADL performance, decreased functional mobility, decreased endurance. Recommend continued skilled OT at a mod intensity to maximize pt safety and independence after discharge.  Prognosis: Good  Barriers to Discharge Home: Caregiver assistance, Physical needs  Caregiver Assistance: Patient lives alone and/or does not have reliable caregiver assistance  Physical Needs: 24hr mobility assistance needed, 24hr ADL assistance needed, High falls risk due to function or environment  Evaluation/Treatment Tolerance: Patient tolerated treatment well  Medical Staff Made Aware: Yes  End of Session Communication: Bedside nurse (discussed freezing and retro-lean with transfers and toileting activities.)  End of Session Patient Position: Up in chair (call bell and all needs in easy reach.)  Plan:  Treatment Interventions: ADL retraining, Functional transfer training, Endurance training, UE strengthening/ROM  OT Frequency: 3 times per week (During this acute inpatient hospitalization. Based on current functional status & rehab potential, patient is anticipated to tolerate and benefit from 5+ days/wk of skilled rehabilitative therapy after discharge from this acute inpatient hospitalization.)  OT Discharge Recommendations: Moderate intensity level of continued care  Equipment Recommended upon Discharge: Wheeled walker, Straight cane (owns)  OT Recommended Transfer Status: Assist of 1  OT - OK to Discharge: Yes (In accord with OT POC)    Subjective     OT Visit Info:  OT Received On: 07/28/25  General:  General  Reason for Referral: Pt is 80 yr female with generalized weakness, constipation, Parkinson's disease and unspecific whether  dyskinesia present resulting in impaired safety awareness as well as impaired mobility and gait. OT referred for impaired ADL and related mobility.  Referred By: Faby Babcock DO  Past Medical History Relevant to Rehab: parkinson's, HTN, IBS, OA, Afib not on anticoagulation  Family/Caregiver Present: No  Prior to Session Communication: Bedside nurse (Pt with balance difficulty and freezing in mobility and transfers. No new limitations in course of treatment.)  Patient Position Received: Alarm on, Up in chair  Preferred Learning Style: verbal  General Comment: Pt pleasant and cooperative, easily directed though demnstrated decreased self awareness and insight related to safety in transfers and mobility.  Precautions:  Medical Precautions: Fall precautions    Pain Assessment:  Pain Assessment  Pain Assessment: 0-10  0-10 (Numeric) Pain Score: 3  Pain Type: Acute pain  Pain Location: Generalized  Pain Interventions: Repositioned, Ambulation/increased activity    Objective   Cognition:  Overall Cognitive Status: Within Functional Limits  Arousal/Alertness: Appropriate responses to stimuli  Orientation Level: Oriented X4    Activities of Daily Living: LE Dressing  LE Dressing: Yes  Sock Level of Assistance: Moderate verbal cues, Contact guard  Shoe Level of Assistance: Moderate verbal cues  Adult Briefs Level of Assistance: Moderate assistance, Moderate verbal cues  LE Dressing Where Assessed: Chair  LE Dressing Comments: Pt demonstrated bradykientic motion and and strong rero-lean in standing to pull brief up over hips from below knee level with front wheel walker.    Toileting  Toileting Level of Assistance: Moderate assistance, Moderate verbal cues  Where Assessed: Bedside commode  Toileting Comments: Asssit needed to widen CODY and correct retro-lean during standing components with clothing management and clean-up following toieting.     Bed Mobility/Transfers:    Transfers  Transfer: Yes  Transfer 1  Transfer  From 1: Toilet to, Chair with arms to  Transfer to 1: Stand  Technique 1: Sit to stand, Stand to sit  Transfer Device 1: Walker  Transfer Level of Assistance 1: Moderate assistance, Moderate tactile cues, Moderate verbal cues  Trials/Comments 1: Cues needed for safe transition hands to/from device and for essential weight shifts. Pt reports depending on bed raising and for lift chair assist to stand.  Transfers 2  Transfer From 2: Chair with arms to  Transfer to 2: Commode-standard  Technique 2: Stand pivot  Transfer Device 2: Walker  Transfer Level of Assistance 2: Moderate assistance, Moderate verbal cues  Trials/Comments 2: Assist needed to manage freezing and assist to counter retro-lean in turning and backing.    Sitting Balance:  Static Sitting Balance  Static Sitting-Balance Support: Feet supported  Static Sitting-Level of Assistance: Close supervision  Dynamic Sitting Balance  Dynamic Sitting-Balance Support: Feet supported  Dynamic Sitting-Level of Assistance: Moderate assistance    Outcome Measures:  [facility] Daily Activity  Putting on and taking off regular lower body clothing: A lot  Bathing (including washing, rinsing, drying): A lot  Putting on and taking off regular upper body clothing: A little  Toileting, which includes using toilet, bedpan or urinal: A lot  Taking care of personal grooming such as brushing teeth: A little  Eating Meals: A little  Daily Activity - Total Score: 15    Education Documentation  Body Mechanics, taught by VENUS Falcon at 7/28/2025 10:07 AM.  Learner: Patient  Readiness: Acceptance  Method: Explanation, Demonstration  Response: Verbalizes Understanding, Demonstrated Understanding, Needs Reinforcement  Comment: Pt educated on falls precautions and device safety, effective body mechanics, use of adaptive techniques and tools for self-care, and non-medication pain management and energy conservation in course of session. Continued reinforcement is indicated.    Precautions,  taught by VENUS Falcon at 7/28/2025 10:07 AM.  Learner: Patient  Readiness: Acceptance  Method: Explanation, Demonstration  Response: Verbalizes Understanding, Demonstrated Understanding, Needs Reinforcement  Comment: Pt educated on falls precautions and device safety, effective body mechanics, use of adaptive techniques and tools for self-care, and non-medication pain management and energy conservation in course of session. Continued reinforcement is indicated.    ADL Training, taught by VENUS Falcon at 7/28/2025 10:07 AM.  Learner: Patient  Readiness: Acceptance  Method: Explanation, Demonstration  Response: Verbalizes Understanding, Demonstrated Understanding, Needs Reinforcement  Comment: Pt educated on falls precautions and device safety, effective body mechanics, use of adaptive techniques and tools for self-care, and non-medication pain management and energy conservation in course of session. Continued reinforcement is indicated.    Goals:  Encounter Problems       Encounter Problems (Active)       OT Goals       Pt will demo Grooming/UE ADL completion with setup, using adaptive strategies and energy conservation techniques as applicable.  (Progressing)       Start:  07/24/25    Expected End:  08/07/25            Pt will demo LE ADL completion with Mod A, using AE if needed.  (Progressing)       Start:  07/24/25    Expected End:  08/07/25            Pt will increase endurance to tolerate 15min of OOB activity with no more than 1 rest break in order to increase ability to engage in ADL completion.  (Progressing)       Start:  07/24/25    Expected End:  08/07/25            Pt will complete [id number] transfers using LRD in preparation for ADLs with min A  (Progressing)       Start:  07/24/25    Expected End:  08/07/25            Pt will increase static/dynamic sitting balance to Good to increase safety and independence with functional task completion.   (Progressing)       Start:  07/24/25     Expected End:  08/07/25

## 2025-07-28 NOTE — DISCHARGE SUMMARY
Discharge Diagnosis  Generalized weakness           Issues Requiring Follow-Up  PT/OT at SNF  Follow up with PCP    Discharge Meds     Medication List      ASK your doctor about these medications     amLODIPine 5 mg tablet; Commonly known as: Norvasc; Take 1 tablet (5 mg)   by mouth once daily.   BENADRYL ORAL   cetirizine 10 mg tablet; Commonly known as: ZyrTEC; Take 1 tablet (10   mg) by mouth once daily.   ketotifen 0.025 % (0.035 %) ophthalmic solution; Commonly known as:   Zaditor; Administer 1 drop into the right eye 2 times a day.   multivitamin tablet   rasagiline 0.5 mg tablet; Commonly known as: Azilect; Take 1 tablet (0.5   mg) by mouth once daily.   TylenoL 325 mg tablet; Generic drug: acetaminophen       Test Results Pending At Discharge  Pending Labs       No current pending labs.            Hospital Course   Kaitlin Robles is a 80 y.o. female with PMH of parkinson's, HTN, IBS, OA, Afib not on anticoagulation who presented to the ED on 7/23 for evaluation of generalized weakness. She was unable to get out of bed and had some constipation. She was admitted and PT/OT was consulted. Patient started on miralax and lauri-colace and had a bowel movement overnight on 7/24. PT/OT recommended moderate intensity therapy. Discharge to SNF for increased PT/OT requirements.    Pertinent Physical Exam At Time of Discharge  Physical Exam  Constitutional:       General: She is not in acute distress.     Appearance: Normal appearance. She is not ill-appearing, toxic-appearing or diaphoretic.     Cardiovascular:      Rate and Rhythm: Normal rate and regular rhythm.      Pulses: Normal pulses.      Heart sounds: Normal heart sounds. No murmur heard.     No friction rub. No gallop.   Pulmonary:      Effort: Pulmonary effort is normal. No respiratory distress.      Breath sounds: Normal breath sounds. No stridor. No wheezing, rhonchi or rales.   Chest:      Chest wall: No tenderness.   Abdominal:      General: Abdomen is  flat. There is no distension.      Palpations: Abdomen is soft. There is no mass.      Tenderness: There is no abdominal tenderness. There is no guarding or rebound.      Hernia: No hernia is present.     Musculoskeletal:      Comments: Cogwheel rigidity  Generalized weakness     Skin:     General: Skin is warm and dry.      Coloration: Skin is not jaundiced or pale.      Findings: No bruising, erythema, lesion or rash.     Neurological:      General: No focal deficit present.      Mental Status: She is alert and oriented to person, place, and time.     Psychiatric:         Mood and Affect: Mood normal.         Behavior: Behavior normal.         Outpatient Follow-Up  No future appointments.      Damari Dejesus, DO

## 2025-07-29 VITALS
OXYGEN SATURATION: 96 % | BODY MASS INDEX: 17.84 KG/M2 | SYSTOLIC BLOOD PRESSURE: 138 MMHG | TEMPERATURE: 97.3 F | HEIGHT: 64 IN | WEIGHT: 104.5 LBS | HEART RATE: 96 BPM | DIASTOLIC BLOOD PRESSURE: 71 MMHG | RESPIRATION RATE: 17 BRPM

## 2025-07-29 LAB
ALBUMIN SERPL BCP-MCNC: 3.7 G/DL (ref 3.4–5)
ANION GAP SERPL CALC-SCNC: 9 MMOL/L (ref 10–20)
BUN SERPL-MCNC: 22 MG/DL (ref 6–23)
CALCIUM SERPL-MCNC: 9.2 MG/DL (ref 8.6–10.3)
CHLORIDE SERPL-SCNC: 105 MMOL/L (ref 98–107)
CO2 SERPL-SCNC: 29 MMOL/L (ref 21–32)
CREAT SERPL-MCNC: 0.8 MG/DL (ref 0.5–1.05)
EGFRCR SERPLBLD CKD-EPI 2021: 76 ML/MIN/1.73M*2
ERYTHROCYTE [DISTWIDTH] IN BLOOD BY AUTOMATED COUNT: 12.5 % (ref 11.5–14.5)
GLUCOSE SERPL-MCNC: 81 MG/DL (ref 74–99)
HCT VFR BLD AUTO: 36.7 % (ref 36–46)
HGB BLD-MCNC: 11.9 G/DL (ref 12–16)
MAGNESIUM SERPL-MCNC: 2.26 MG/DL (ref 1.6–2.4)
MCH RBC QN AUTO: 31.4 PG (ref 26–34)
MCHC RBC AUTO-ENTMCNC: 32.4 G/DL (ref 32–36)
MCV RBC AUTO: 97 FL (ref 80–100)
NRBC BLD-RTO: 0 /100 WBCS (ref 0–0)
PHOSPHATE SERPL-MCNC: 4.2 MG/DL (ref 2.5–4.9)
PLATELET # BLD AUTO: 212 X10*3/UL (ref 150–450)
POTASSIUM SERPL-SCNC: 4.2 MMOL/L (ref 3.5–5.3)
RBC # BLD AUTO: 3.79 X10*6/UL (ref 4–5.2)
SODIUM SERPL-SCNC: 139 MMOL/L (ref 136–145)
WBC # BLD AUTO: 4.4 X10*3/UL (ref 4.4–11.3)

## 2025-07-29 PROCEDURE — 2500000002 HC RX 250 W HCPCS SELF ADMINISTERED DRUGS (ALT 637 FOR MEDICARE OP, ALT 636 FOR OP/ED)

## 2025-07-29 PROCEDURE — 83735 ASSAY OF MAGNESIUM: CPT

## 2025-07-29 PROCEDURE — 85027 COMPLETE CBC AUTOMATED: CPT

## 2025-07-29 PROCEDURE — 80069 RENAL FUNCTION PANEL: CPT

## 2025-07-29 PROCEDURE — 94760 N-INVAS EAR/PLS OXIMETRY 1: CPT

## 2025-07-29 PROCEDURE — G0378 HOSPITAL OBSERVATION PER HR: HCPCS

## 2025-07-29 PROCEDURE — 2500000001 HC RX 250 WO HCPCS SELF ADMINISTERED DRUGS (ALT 637 FOR MEDICARE OP)

## 2025-07-29 PROCEDURE — 36415 COLL VENOUS BLD VENIPUNCTURE: CPT

## 2025-07-29 RX ADMIN — KETOTIFEN FUMARATE 1 DROP: 0.25 SOLUTION/ DROPS OPHTHALMIC at 08:22

## 2025-07-29 RX ADMIN — AMLODIPINE BESYLATE 5 MG: 5 TABLET ORAL at 08:21

## 2025-07-29 RX ADMIN — RASAGILINE 0.5 MG: 0.5 TABLET ORAL at 08:21

## 2025-07-29 ASSESSMENT — COGNITIVE AND FUNCTIONAL STATUS - GENERAL
MOBILITY SCORE: 16
TURNING FROM BACK TO SIDE WHILE IN FLAT BAD: A LITTLE
MOVING TO AND FROM BED TO CHAIR: A LITTLE
MOVING FROM LYING ON BACK TO SITTING ON SIDE OF FLAT BED WITH BEDRAILS: A LITTLE
WALKING IN HOSPITAL ROOM: A LOT
CLIMB 3 TO 5 STEPS WITH RAILING: A LOT
DAILY ACTIVITIY SCORE: 18
DRESSING REGULAR LOWER BODY CLOTHING: A LITTLE
PERSONAL GROOMING: A LITTLE
TOILETING: A LITTLE
DRESSING REGULAR UPPER BODY CLOTHING: A LITTLE
EATING MEALS: A LITTLE
STANDING UP FROM CHAIR USING ARMS: A LITTLE
HELP NEEDED FOR BATHING: A LITTLE

## 2025-07-29 ASSESSMENT — PAIN - FUNCTIONAL ASSESSMENT: PAIN_FUNCTIONAL_ASSESSMENT: 0-10

## 2025-07-29 ASSESSMENT — PAIN SCALES - GENERAL: PAINLEVEL_OUTOF10: 0 - NO PAIN

## 2025-07-29 NOTE — SIGNIFICANT EVENT
Patient evaluated at bedside this morning. Was expected to discharge yesterday. No changes from discharge paperwork.

## 2025-08-04 ENCOUNTER — NURSING HOME VISIT (OUTPATIENT)
Dept: POST ACUTE CARE | Facility: EXTERNAL LOCATION | Age: 80
End: 2025-08-04
Payer: MEDICARE

## 2025-08-04 VITALS
SYSTOLIC BLOOD PRESSURE: 147 MMHG | HEART RATE: 82 BPM | TEMPERATURE: 97.5 F | RESPIRATION RATE: 18 BRPM | OXYGEN SATURATION: 98 % | DIASTOLIC BLOOD PRESSURE: 67 MMHG

## 2025-08-04 DIAGNOSIS — G20.A2 PARKINSON'S DISEASE WITHOUT DYSKINESIA, WITH FLUCTUATING MANIFESTATIONS: ICD-10-CM

## 2025-08-04 DIAGNOSIS — I48.0 PAROXYSMAL ATRIAL FIBRILLATION (MULTI): ICD-10-CM

## 2025-08-04 DIAGNOSIS — I10 BENIGN ESSENTIAL HYPERTENSION: Primary | ICD-10-CM

## 2025-08-04 DIAGNOSIS — E46 PROTEIN-CALORIE MALNUTRITION, UNSPECIFIED SEVERITY (MULTI): ICD-10-CM

## 2025-08-04 PROCEDURE — 99309 SBSQ NF CARE MODERATE MDM 30: CPT | Performed by: FAMILY MEDICINE

## 2025-08-04 ASSESSMENT — ENCOUNTER SYMPTOMS
PALPITATIONS: 0
UNEXPECTED WEIGHT CHANGE: 0
CONSTIPATION: 0
VOMITING: 0

## 2025-08-04 NOTE — PROGRESS NOTES
Subjective   Patient ID: Kaitlin Robles is a 80 y.o. female who is acute skilled care being seen and evaluated for multiple medical problems.    HPI   Patient admitted with weakness due to Parkinson's disease  She has past medical history of atrial fibrillation, hypertension, and osteoarthritis  She is without complaints of today    Review of Systems   Constitutional:  Negative for unexpected weight change.   HENT:  Negative for congestion and ear discharge.    Cardiovascular:  Negative for chest pain and palpitations.   Gastrointestinal:  Negative for constipation and vomiting.   All other systems reviewed and are negative.      Objective   /67   Pulse 82   Temp 36.4 °C (97.5 °F)   Resp 18   SpO2 98%     Physical Exam  Constitutional:       General: She is not in acute distress.     Appearance: Normal appearance.   HENT:      Head: Normocephalic and atraumatic.     Eyes:      Conjunctiva/sclera: Conjunctivae normal.       Cardiovascular:      Rate and Rhythm: Normal rate and regular rhythm.   Pulmonary:      Effort: Pulmonary effort is normal.      Breath sounds: Normal breath sounds.   Abdominal:      Palpations: Abdomen is soft.     Musculoskeletal:      Cervical back: Neck supple.   Lymphadenopathy:      Cervical: No cervical adenopathy.     Neurological:      Mental Status: She is alert.         Assessment/Plan   Problem List Items Addressed This Visit           ICD-10-CM    Benign essential hypertension - Primary I10    Protein-calorie malnutrition, unspecified severity (Multi) E46    Encouraged p.o. intake  Patient says she likes to eat although you cannot tell by looking at her she says         Parkinson's disease without dyskinesia, with fluctuating manifestations G20.A2    PT OT ST         Paroxysmal atrial fibrillation (Multi) I48.0    Rate controlled        Patient is walking 20 feet and she is on minimal assist  She lives in a trailer with an aide that comes twice a week  Psych saw her for  Parkinson's depression  Recheck 1 week sooner if any issues arise

## 2025-08-04 NOTE — LETTER
Patient: Kaitlni Robles  : 1945    Encounter Date: 2025    Subjective  Patient ID: Kaitlin Robles is a 80 y.o. female who is acute skilled care being seen and evaluated for multiple medical problems.    HPI   Patient admitted with weakness due to Parkinson's disease  She has past medical history of atrial fibrillation, hypertension, and osteoarthritis  She is without complaints of today    Review of Systems   Constitutional:  Negative for unexpected weight change.   HENT:  Negative for congestion and ear discharge.    Cardiovascular:  Negative for chest pain and palpitations.   Gastrointestinal:  Negative for constipation and vomiting.   All other systems reviewed and are negative.      Objective  /67   Pulse 82   Temp 36.4 °C (97.5 °F)   Resp 18   SpO2 98%     Physical Exam  Constitutional:       General: She is not in acute distress.     Appearance: Normal appearance.   HENT:      Head: Normocephalic and atraumatic.     Eyes:      Conjunctiva/sclera: Conjunctivae normal.       Cardiovascular:      Rate and Rhythm: Normal rate and regular rhythm.   Pulmonary:      Effort: Pulmonary effort is normal.      Breath sounds: Normal breath sounds.   Abdominal:      Palpations: Abdomen is soft.     Musculoskeletal:      Cervical back: Neck supple.   Lymphadenopathy:      Cervical: No cervical adenopathy.     Neurological:      Mental Status: She is alert.         Assessment/Plan  Problem List Items Addressed This Visit           ICD-10-CM    Benign essential hypertension - Primary I10    Protein-calorie malnutrition, unspecified severity (Multi) E46    Encouraged p.o. intake  Patient says she likes to eat although you cannot tell by looking at her she says         Parkinson's disease without dyskinesia, with fluctuating manifestations G20.A2    PT OT ST         Paroxysmal atrial fibrillation (Multi) I48.0    Rate controlled        Patient is walking 20 feet and she is on minimal assist  She lives in a  trailer with an aide that comes twice a week  Psych saw her for Parkinson's depression  Recheck 1 week sooner if any issues arise          Electronically Signed By: Harinder Rodriguez MD   8/4/25  9:35 AM

## 2025-08-04 NOTE — ASSESSMENT & PLAN NOTE
Encouraged p.o. intake  Patient says she likes to eat although you cannot tell by looking at her she says

## 2025-08-06 ENCOUNTER — NURSING HOME VISIT (OUTPATIENT)
Dept: POST ACUTE CARE | Facility: EXTERNAL LOCATION | Age: 80
End: 2025-08-06
Payer: MEDICARE

## 2025-08-06 DIAGNOSIS — I25.10 ATHEROSCLEROSIS OF CORONARY ARTERY, UNSPECIFIED VESSEL OR LESION TYPE, UNSPECIFIED WHETHER ANGINA PRESENT, UNSPECIFIED WHETHER NATIVE OR TRANSPLANTED HEART: ICD-10-CM

## 2025-08-06 DIAGNOSIS — E56.9 VITAMIN DEFICIENCY: ICD-10-CM

## 2025-08-06 DIAGNOSIS — J31.0 CHRONIC RHINITIS: ICD-10-CM

## 2025-08-06 DIAGNOSIS — I10 BENIGN ESSENTIAL HYPERTENSION: ICD-10-CM

## 2025-08-06 DIAGNOSIS — G20.A2 PARKINSON'S DISEASE WITHOUT DYSKINESIA, WITH FLUCTUATING MANIFESTATIONS: Primary | ICD-10-CM

## 2025-08-06 DIAGNOSIS — G45.9 TIA (TRANSIENT ISCHEMIC ATTACK): ICD-10-CM

## 2025-08-06 PROCEDURE — 99309 SBSQ NF CARE MODERATE MDM 30: CPT | Performed by: NURSE PRACTITIONER

## 2025-08-09 LAB
ATRIAL RATE: 105 BPM
P AXIS: 99 DEGREES
P OFFSET: 213 MS
P ONSET: 168 MS
PR INTERVAL: 126 MS
Q ONSET: 231 MS
QRS COUNT: 17 BEATS
QRS DURATION: 64 MS
QT INTERVAL: 328 MS
QTC CALCULATION(BAZETT): 433 MS
QTC FREDERICIA: 395 MS
R AXIS: -85 DEGREES
T AXIS: 76 DEGREES
T OFFSET: 395 MS
VENTRICULAR RATE: 105 BPM

## 2025-08-11 ENCOUNTER — PATIENT OUTREACH (OUTPATIENT)
Dept: PRIMARY CARE | Facility: CLINIC | Age: 80
End: 2025-08-11
Payer: MEDICARE

## 2025-08-12 ENCOUNTER — TELEPHONE (OUTPATIENT)
Dept: PRIMARY CARE | Facility: CLINIC | Age: 80
End: 2025-08-12
Payer: MEDICARE

## 2025-08-21 ENCOUNTER — APPOINTMENT (OUTPATIENT)
Dept: PRIMARY CARE | Facility: CLINIC | Age: 80
End: 2025-08-21
Payer: MEDICARE

## 2025-08-21 VITALS
HEART RATE: 56 BPM | OXYGEN SATURATION: 96 % | DIASTOLIC BLOOD PRESSURE: 70 MMHG | BODY MASS INDEX: 18.19 KG/M2 | WEIGHT: 106 LBS | SYSTOLIC BLOOD PRESSURE: 116 MMHG

## 2025-08-21 DIAGNOSIS — E46 PROTEIN-CALORIE MALNUTRITION, UNSPECIFIED SEVERITY (MULTI): ICD-10-CM

## 2025-08-21 DIAGNOSIS — E78.2 MIXED HYPERLIPIDEMIA: Primary | ICD-10-CM

## 2025-08-21 DIAGNOSIS — G20.A2 PARKINSON'S DISEASE WITHOUT DYSKINESIA, WITH FLUCTUATING MANIFESTATIONS: ICD-10-CM

## 2025-08-21 DIAGNOSIS — I48.0 PAROXYSMAL ATRIAL FIBRILLATION (MULTI): ICD-10-CM

## 2025-08-21 DIAGNOSIS — R53.1 GENERALIZED WEAKNESS: ICD-10-CM

## 2025-08-21 DIAGNOSIS — I10 BENIGN ESSENTIAL HYPERTENSION: ICD-10-CM

## 2025-08-21 PROCEDURE — 3074F SYST BP LT 130 MM HG: CPT | Performed by: FAMILY MEDICINE

## 2025-08-21 PROCEDURE — 1159F MED LIST DOCD IN RCRD: CPT | Performed by: FAMILY MEDICINE

## 2025-08-21 PROCEDURE — G2211 COMPLEX E/M VISIT ADD ON: HCPCS | Performed by: FAMILY MEDICINE

## 2025-08-21 PROCEDURE — 3078F DIAST BP <80 MM HG: CPT | Performed by: FAMILY MEDICINE

## 2025-08-21 PROCEDURE — 1160F RVW MEDS BY RX/DR IN RCRD: CPT | Performed by: FAMILY MEDICINE

## 2025-08-21 PROCEDURE — 99214 OFFICE O/P EST MOD 30 MIN: CPT | Performed by: FAMILY MEDICINE

## 2025-08-21 PROCEDURE — 1036F TOBACCO NON-USER: CPT | Performed by: FAMILY MEDICINE

## 2025-08-21 RX ORDER — CALCIUM CARBONATE 300MG(750)
400 TABLET,CHEWABLE ORAL DAILY
COMMUNITY

## 2025-08-21 RX ORDER — RASAGILINE 0.5 MG/1
0.5 TABLET ORAL DAILY
Qty: 90 TABLET | Refills: 3 | Status: SHIPPED | OUTPATIENT
Start: 2025-08-21 | End: 2026-08-21

## 2025-08-21 ASSESSMENT — PATIENT HEALTH QUESTIONNAIRE - PHQ9
1. LITTLE INTEREST OR PLEASURE IN DOING THINGS: NOT AT ALL
2. FEELING DOWN, DEPRESSED OR HOPELESS: NOT AT ALL
SUM OF ALL RESPONSES TO PHQ9 QUESTIONS 1 AND 2: 0

## 2025-08-22 LAB
ALBUMIN SERPL-MCNC: 4.6 G/DL (ref 3.6–5.1)
ALP SERPL-CCNC: 67 U/L (ref 37–153)
ALT SERPL-CCNC: 14 U/L (ref 6–29)
ANION GAP SERPL CALCULATED.4IONS-SCNC: 10 MMOL/L (CALC) (ref 7–17)
AST SERPL-CCNC: 17 U/L (ref 10–35)
BILIRUB SERPL-MCNC: 0.6 MG/DL (ref 0.2–1.2)
BUN SERPL-MCNC: 19 MG/DL (ref 7–25)
CALCIUM SERPL-MCNC: 9.9 MG/DL (ref 8.6–10.4)
CHLORIDE SERPL-SCNC: 105 MMOL/L (ref 98–110)
CO2 SERPL-SCNC: 26 MMOL/L (ref 20–32)
CREAT SERPL-MCNC: 0.84 MG/DL (ref 0.6–1)
EGFRCR SERPLBLD CKD-EPI 2021: 71 ML/MIN/1.73M2
ERYTHROCYTE [DISTWIDTH] IN BLOOD BY AUTOMATED COUNT: 12.6 % (ref 11–15)
GLUCOSE SERPL-MCNC: 94 MG/DL (ref 65–99)
HCT VFR BLD AUTO: 38.3 % (ref 35–45)
HGB BLD-MCNC: 12.2 G/DL (ref 11.7–15.5)
MAGNESIUM SERPL-MCNC: 2.5 MG/DL (ref 1.5–2.5)
MCH RBC QN AUTO: 31.2 PG (ref 27–33)
MCHC RBC AUTO-ENTMCNC: 31.9 G/DL (ref 32–36)
MCV RBC AUTO: 98 FL (ref 80–100)
PLATELET # BLD AUTO: 235 THOUSAND/UL (ref 140–400)
PMV BLD REES-ECKER: 11.6 FL (ref 7.5–12.5)
POTASSIUM SERPL-SCNC: 4.5 MMOL/L (ref 3.5–5.3)
PROT SERPL-MCNC: 6.8 G/DL (ref 6.1–8.1)
RBC # BLD AUTO: 3.91 MILLION/UL (ref 3.8–5.1)
SODIUM SERPL-SCNC: 141 MMOL/L (ref 135–146)
TSH SERPL-ACNC: 1.14 MIU/L (ref 0.4–4.5)
WBC # BLD AUTO: 3.4 THOUSAND/UL (ref 3.8–10.8)

## 2025-08-25 ENCOUNTER — PATIENT OUTREACH (OUTPATIENT)
Dept: PRIMARY CARE | Facility: CLINIC | Age: 80
End: 2025-08-25
Payer: MEDICARE

## 2026-02-19 ENCOUNTER — APPOINTMENT (OUTPATIENT)
Dept: PRIMARY CARE | Facility: CLINIC | Age: 81
End: 2026-02-19
Payer: MEDICARE